# Patient Record
Sex: MALE | Race: WHITE | Employment: UNEMPLOYED | URBAN - METROPOLITAN AREA
[De-identification: names, ages, dates, MRNs, and addresses within clinical notes are randomized per-mention and may not be internally consistent; named-entity substitution may affect disease eponyms.]

---

## 2020-07-29 ENCOUNTER — APPOINTMENT (OUTPATIENT)
Dept: CT IMAGING | Age: 85
DRG: 957 | End: 2020-07-29
Payer: COMMERCIAL

## 2020-07-29 ENCOUNTER — HOSPITAL ENCOUNTER (INPATIENT)
Age: 85
LOS: 8 days | Discharge: NURSING, REHAB OR LONG-TERM CARE | DRG: 957 | End: 2020-08-06
Attending: EMERGENCY MEDICINE | Admitting: PSYCHIATRY & NEUROLOGY
Payer: COMMERCIAL

## 2020-07-29 ENCOUNTER — APPOINTMENT (OUTPATIENT)
Dept: MRI IMAGING | Age: 85
DRG: 957 | End: 2020-07-29
Payer: COMMERCIAL

## 2020-07-29 ENCOUNTER — APPOINTMENT (OUTPATIENT)
Dept: GENERAL RADIOLOGY | Age: 85
DRG: 957 | End: 2020-07-29
Payer: COMMERCIAL

## 2020-07-29 PROBLEM — I60.9 SAH (SUBARACHNOID HEMORRHAGE) (HCC): Status: ACTIVE | Noted: 2020-07-29

## 2020-07-29 LAB
% CKMB: 2.9 % (ref 0–3.5)
ABSOLUTE EOS #: 0.13 K/UL (ref 0–0.44)
ABSOLUTE IMMATURE GRANULOCYTE: <0.03 K/UL (ref 0–0.3)
ABSOLUTE LYMPH #: 1.02 K/UL (ref 1.1–3.7)
ABSOLUTE MONO #: 0.79 K/UL (ref 0.1–1.2)
ALLEN TEST: ABNORMAL
ANION GAP SERPL CALCULATED.3IONS-SCNC: 11 MMOL/L (ref 9–17)
ANION GAP: 7 MMOL/L (ref 7–16)
BASOPHILS # BLD: 0 % (ref 0–2)
BASOPHILS ABSOLUTE: <0.03 K/UL (ref 0–0.2)
BUN BLDV-MCNC: 16 MG/DL (ref 8–23)
BUN/CREAT BLD: ABNORMAL (ref 9–20)
CALCIUM SERPL-MCNC: 8.6 MG/DL (ref 8.6–10.4)
CHLORIDE BLD-SCNC: 99 MMOL/L (ref 98–107)
CK MB: 1.6 NG/ML
CKMB INTERPRETATION: ABNORMAL
CO2: 25 MMOL/L (ref 20–31)
CREAT SERPL-MCNC: 0.51 MG/DL (ref 0.7–1.2)
DIFFERENTIAL TYPE: ABNORMAL
EOSINOPHILS RELATIVE PERCENT: 2 % (ref 1–4)
ESTIMATED AVERAGE GLUCOSE: 108 MG/DL
FIO2: ABNORMAL
GFR AFRICAN AMERICAN: >60 ML/MIN
GFR NON-AFRICAN AMERICAN: >60 ML/MIN
GFR NON-AFRICAN AMERICAN: >60 ML/MIN
GFR SERPL CREATININE-BSD FRML MDRD: >60 ML/MIN
GFR SERPL CREATININE-BSD FRML MDRD: ABNORMAL ML/MIN/{1.73_M2}
GFR SERPL CREATININE-BSD FRML MDRD: ABNORMAL ML/MIN/{1.73_M2}
GFR SERPL CREATININE-BSD FRML MDRD: NORMAL ML/MIN/{1.73_M2}
GLUCOSE BLD-MCNC: 77 MG/DL (ref 74–100)
GLUCOSE BLD-MCNC: 81 MG/DL (ref 70–99)
HBA1C MFR BLD: 5.4 % (ref 4–6)
HCO3 VENOUS: 32.4 MMOL/L (ref 22–29)
HCT VFR BLD CALC: 36.6 % (ref 40.7–50.3)
HEMOGLOBIN: 12 G/DL (ref 13–17)
IMMATURE GRANULOCYTES: 0 %
INR BLD: 1
LYMPHOCYTES # BLD: 18 % (ref 24–43)
MCH RBC QN AUTO: 33.1 PG (ref 25.2–33.5)
MCHC RBC AUTO-ENTMCNC: 32.8 G/DL (ref 28.4–34.8)
MCV RBC AUTO: 101.1 FL (ref 82.6–102.9)
MODE: ABNORMAL
MONOCYTES # BLD: 14 % (ref 3–12)
MYOGLOBIN: 54 NG/ML (ref 28–72)
NEGATIVE BASE EXCESS, VEN: ABNORMAL (ref 0–2)
NRBC AUTOMATED: 0 PER 100 WBC
O2 DEVICE/FLOW/%: ABNORMAL
O2 SAT, VEN: 67 % (ref 60–85)
PARTIAL THROMBOPLASTIN TIME: 18.7 SEC (ref 20.5–30.5)
PATIENT TEMP: ABNORMAL
PCO2, VEN: 52.2 MM HG (ref 41–51)
PDW BLD-RTO: 14 % (ref 11.8–14.4)
PH VENOUS: 7.4 (ref 7.32–7.43)
PLATELET # BLD: ABNORMAL K/UL (ref 138–453)
PLATELET ESTIMATE: ABNORMAL
PLATELET, FLUORESCENCE: 135 K/UL (ref 138–453)
PLATELET, IMMATURE FRACTION: 8.2 % (ref 1.1–10.3)
PMV BLD AUTO: ABNORMAL FL (ref 8.1–13.5)
PO2, VEN: 35.5 MM HG (ref 30–50)
POC CHLORIDE: 100 MMOL/L (ref 98–107)
POC CREATININE: 0.65 MG/DL (ref 0.51–1.19)
POC HEMATOCRIT: 36 % (ref 41–53)
POC HEMOGLOBIN: 12.3 G/DL (ref 13.5–17.5)
POC IONIZED CALCIUM: 1.19 MMOL/L (ref 1.15–1.33)
POC LACTIC ACID: 1.35 MMOL/L (ref 0.56–1.39)
POC PCO2 TEMP: ABNORMAL MM HG
POC PH TEMP: ABNORMAL
POC PO2 TEMP: ABNORMAL MM HG
POC POTASSIUM: 4 MMOL/L (ref 3.5–4.5)
POC SODIUM: 139 MMOL/L (ref 138–146)
POSITIVE BASE EXCESS, VEN: 6 (ref 0–3)
POTASSIUM SERPL-SCNC: 4.1 MMOL/L (ref 3.7–5.3)
PROTHROMBIN TIME: 10.7 SEC (ref 9–12)
RBC # BLD: 3.62 M/UL (ref 4.21–5.77)
RBC # BLD: ABNORMAL 10*6/UL
SAMPLE SITE: ABNORMAL
SEG NEUTROPHILS: 65 % (ref 36–65)
SEGMENTED NEUTROPHILS ABSOLUTE COUNT: 3.67 K/UL (ref 1.5–8.1)
SODIUM BLD-SCNC: 135 MMOL/L (ref 135–144)
TOTAL CK: 56 U/L (ref 39–308)
TOTAL CO2, VENOUS: 34 MMOL/L (ref 23–30)
TROPONIN INTERP: ABNORMAL
TROPONIN T: ABNORMAL NG/ML
TROPONIN, HIGH SENSITIVITY: 32 NG/L (ref 0–22)
TSH SERPL DL<=0.05 MIU/L-ACNC: 1.44 MIU/L (ref 0.3–5)
WBC # BLD: 5.7 K/UL (ref 3.5–11.3)
WBC # BLD: ABNORMAL 10*3/UL

## 2020-07-29 PROCEDURE — 72125 CT NECK SPINE W/O DYE: CPT

## 2020-07-29 PROCEDURE — 85730 THROMBOPLASTIN TIME PARTIAL: CPT

## 2020-07-29 PROCEDURE — 84484 ASSAY OF TROPONIN QUANT: CPT

## 2020-07-29 PROCEDURE — 82435 ASSAY OF BLOOD CHLORIDE: CPT

## 2020-07-29 PROCEDURE — 99221 1ST HOSP IP/OBS SF/LOW 40: CPT | Performed by: PSYCHIATRY & NEUROLOGY

## 2020-07-29 PROCEDURE — 6360000004 HC RX CONTRAST MEDICATION: Performed by: EMERGENCY MEDICINE

## 2020-07-29 PROCEDURE — 83036 HEMOGLOBIN GLYCOSYLATED A1C: CPT

## 2020-07-29 PROCEDURE — 85055 RETICULATED PLATELET ASSAY: CPT

## 2020-07-29 PROCEDURE — 84132 ASSAY OF SERUM POTASSIUM: CPT

## 2020-07-29 PROCEDURE — 85025 COMPLETE CBC W/AUTO DIFF WBC: CPT

## 2020-07-29 PROCEDURE — 85014 HEMATOCRIT: CPT

## 2020-07-29 PROCEDURE — 93005 ELECTROCARDIOGRAM TRACING: CPT | Performed by: STUDENT IN AN ORGANIZED HEALTH CARE EDUCATION/TRAINING PROGRAM

## 2020-07-29 PROCEDURE — 70498 CT ANGIOGRAPHY NECK: CPT

## 2020-07-29 PROCEDURE — 82550 ASSAY OF CK (CPK): CPT

## 2020-07-29 PROCEDURE — 82553 CREATINE MB FRACTION: CPT

## 2020-07-29 PROCEDURE — 6360000004 HC RX CONTRAST MEDICATION: Performed by: STUDENT IN AN ORGANIZED HEALTH CARE EDUCATION/TRAINING PROGRAM

## 2020-07-29 PROCEDURE — 85610 PROTHROMBIN TIME: CPT

## 2020-07-29 PROCEDURE — 71045 X-RAY EXAM CHEST 1 VIEW: CPT

## 2020-07-29 PROCEDURE — 84295 ASSAY OF SERUM SODIUM: CPT

## 2020-07-29 PROCEDURE — 83874 ASSAY OF MYOGLOBIN: CPT

## 2020-07-29 PROCEDURE — 96374 THER/PROPH/DIAG INJ IV PUSH: CPT

## 2020-07-29 PROCEDURE — 71260 CT THORAX DX C+: CPT

## 2020-07-29 PROCEDURE — 6360000002 HC RX W HCPCS: Performed by: STUDENT IN AN ORGANIZED HEALTH CARE EDUCATION/TRAINING PROGRAM

## 2020-07-29 PROCEDURE — 84443 ASSAY THYROID STIM HORMONE: CPT

## 2020-07-29 PROCEDURE — 80048 BASIC METABOLIC PNL TOTAL CA: CPT

## 2020-07-29 PROCEDURE — 70450 CT HEAD/BRAIN W/O DYE: CPT

## 2020-07-29 PROCEDURE — 6360000002 HC RX W HCPCS: Performed by: INTERNAL MEDICINE

## 2020-07-29 PROCEDURE — 82565 ASSAY OF CREATININE: CPT

## 2020-07-29 PROCEDURE — 99285 EMERGENCY DEPT VISIT HI MDM: CPT

## 2020-07-29 PROCEDURE — 70551 MRI BRAIN STEM W/O DYE: CPT

## 2020-07-29 PROCEDURE — 82803 BLOOD GASES ANY COMBINATION: CPT

## 2020-07-29 PROCEDURE — 82330 ASSAY OF CALCIUM: CPT

## 2020-07-29 PROCEDURE — 82947 ASSAY GLUCOSE BLOOD QUANT: CPT

## 2020-07-29 PROCEDURE — 2500000003 HC RX 250 WO HCPCS: Performed by: STUDENT IN AN ORGANIZED HEALTH CARE EDUCATION/TRAINING PROGRAM

## 2020-07-29 PROCEDURE — 2580000003 HC RX 258: Performed by: STUDENT IN AN ORGANIZED HEALTH CARE EDUCATION/TRAINING PROGRAM

## 2020-07-29 PROCEDURE — 83605 ASSAY OF LACTIC ACID: CPT

## 2020-07-29 PROCEDURE — APPNB30 APP NON BILLABLE TIME 0-30 MINS: Performed by: REGISTERED NURSE

## 2020-07-29 PROCEDURE — 2060000000 HC ICU INTERMEDIATE R&B

## 2020-07-29 PROCEDURE — 72131 CT LUMBAR SPINE W/O DYE: CPT

## 2020-07-29 PROCEDURE — 6370000000 HC RX 637 (ALT 250 FOR IP): Performed by: STUDENT IN AN ORGANIZED HEALTH CARE EDUCATION/TRAINING PROGRAM

## 2020-07-29 PROCEDURE — 96376 TX/PRO/DX INJ SAME DRUG ADON: CPT

## 2020-07-29 PROCEDURE — 72128 CT CHEST SPINE W/O DYE: CPT

## 2020-07-29 RX ORDER — SODIUM CHLORIDE 0.9 % (FLUSH) 0.9 %
10 SYRINGE (ML) INJECTION PRN
Status: DISCONTINUED | OUTPATIENT
Start: 2020-07-29 | End: 2020-08-04

## 2020-07-29 RX ORDER — SENNA PLUS 8.6 MG/1
1 TABLET ORAL DAILY
Status: DISCONTINUED | OUTPATIENT
Start: 2020-07-30 | End: 2020-08-06 | Stop reason: HOSPADM

## 2020-07-29 RX ORDER — DOCUSATE SODIUM 100 MG/1
100 CAPSULE, LIQUID FILLED ORAL 2 TIMES DAILY
COMMUNITY

## 2020-07-29 RX ORDER — DOXAZOSIN 2 MG/1
2 TABLET ORAL NIGHTLY
COMMUNITY

## 2020-07-29 RX ORDER — IBUPROFEN 600 MG/1
600 TABLET ORAL 2 TIMES DAILY
COMMUNITY

## 2020-07-29 RX ORDER — PROPRANOLOL HCL 60 MG
60 CAPSULE, EXTENDED RELEASE 24HR ORAL 2 TIMES DAILY
COMMUNITY

## 2020-07-29 RX ORDER — FENTANYL CITRATE 50 UG/ML
25 INJECTION, SOLUTION INTRAMUSCULAR; INTRAVENOUS ONCE
Status: COMPLETED | OUTPATIENT
Start: 2020-07-29 | End: 2020-07-29

## 2020-07-29 RX ORDER — AMANTADINE HYDROCHLORIDE 100 MG/1
100 CAPSULE, GELATIN COATED ORAL DAILY
COMMUNITY

## 2020-07-29 RX ORDER — ACETAMINOPHEN 500 MG
1000 TABLET ORAL EVERY 8 HOURS SCHEDULED
Status: DISCONTINUED | OUTPATIENT
Start: 2020-07-29 | End: 2020-08-06 | Stop reason: HOSPADM

## 2020-07-29 RX ORDER — METHOCARBAMOL 750 MG/1
750 TABLET, FILM COATED ORAL 3 TIMES DAILY
Status: DISCONTINUED | OUTPATIENT
Start: 2020-07-29 | End: 2020-08-04

## 2020-07-29 RX ORDER — ONDANSETRON 2 MG/ML
4 INJECTION INTRAMUSCULAR; INTRAVENOUS EVERY 6 HOURS PRN
Status: DISCONTINUED | OUTPATIENT
Start: 2020-07-29 | End: 2020-08-06 | Stop reason: HOSPADM

## 2020-07-29 RX ORDER — CARBIDOPA 25 MG/1
TABLET ORAL
COMMUNITY
End: 2020-09-15 | Stop reason: SDUPTHER

## 2020-07-29 RX ORDER — CITALOPRAM 20 MG/1
20 TABLET ORAL DAILY
COMMUNITY

## 2020-07-29 RX ORDER — ATORVASTATIN CALCIUM 10 MG/1
10 TABLET, FILM COATED ORAL EVERY OTHER DAY
Status: ON HOLD | COMMUNITY
End: 2020-08-04 | Stop reason: HOSPADM

## 2020-07-29 RX ORDER — SODIUM CHLORIDE 0.9 % (FLUSH) 0.9 %
10 SYRINGE (ML) INJECTION EVERY 12 HOURS SCHEDULED
Status: DISCONTINUED | OUTPATIENT
Start: 2020-07-29 | End: 2020-08-04

## 2020-07-29 RX ORDER — PRIMIDONE 50 MG/1
100 TABLET ORAL 2 TIMES DAILY
COMMUNITY

## 2020-07-29 RX ORDER — LABETALOL HYDROCHLORIDE 5 MG/ML
5 INJECTION, SOLUTION INTRAVENOUS ONCE
Status: DISCONTINUED | OUTPATIENT
Start: 2020-07-29 | End: 2020-07-31

## 2020-07-29 RX ORDER — PROPRANOLOL HYDROCHLORIDE 40 MG/1
40 TABLET ORAL 2 TIMES DAILY
Status: ON HOLD | COMMUNITY
End: 2020-08-04 | Stop reason: HOSPADM

## 2020-07-29 RX ORDER — 0.9 % SODIUM CHLORIDE 0.9 %
50 INTRAVENOUS SOLUTION INTRAVENOUS ONCE
Status: DISCONTINUED | OUTPATIENT
Start: 2020-07-29 | End: 2020-07-29

## 2020-07-29 RX ORDER — LABETALOL HYDROCHLORIDE 5 MG/ML
5 INJECTION, SOLUTION INTRAVENOUS PRN
Status: DISCONTINUED | OUTPATIENT
Start: 2020-07-29 | End: 2020-07-30

## 2020-07-29 RX ORDER — FINASTERIDE 5 MG/1
5 TABLET, FILM COATED ORAL DAILY
COMMUNITY

## 2020-07-29 RX ORDER — PROPRANOLOL HYDROCHLORIDE 120 MG/1
120 CAPSULE, EXTENDED RELEASE ORAL 2 TIMES DAILY
Status: ON HOLD | COMMUNITY
End: 2020-07-31

## 2020-07-29 RX ORDER — DEXTROSE MONOHYDRATE 25 G/50ML
12.5 INJECTION, SOLUTION INTRAVENOUS
Status: ACTIVE | OUTPATIENT
Start: 2020-07-29 | End: 2020-07-29

## 2020-07-29 RX ORDER — SODIUM CHLORIDE 0.9 % (FLUSH) 0.9 %
10 SYRINGE (ML) INJECTION PRN
Status: DISCONTINUED | OUTPATIENT
Start: 2020-07-29 | End: 2020-07-31

## 2020-07-29 RX ORDER — SODIUM CHLORIDE 0.9 % (FLUSH) 0.9 %
10 SYRINGE (ML) INJECTION EVERY 12 HOURS SCHEDULED
Status: DISCONTINUED | OUTPATIENT
Start: 2020-07-29 | End: 2020-07-31

## 2020-07-29 RX ORDER — LABETALOL HYDROCHLORIDE 5 MG/ML
5 INJECTION, SOLUTION INTRAVENOUS ONCE
Status: COMPLETED | OUTPATIENT
Start: 2020-07-29 | End: 2020-07-29

## 2020-07-29 RX ORDER — CARBIDOPA AND LEVODOPA 25; 100 MG/1; MG/1
1 TABLET, EXTENDED RELEASE ORAL 4 TIMES DAILY
COMMUNITY

## 2020-07-29 RX ADMIN — IOHEXOL 90 ML: 350 INJECTION, SOLUTION INTRAVENOUS at 13:49

## 2020-07-29 RX ADMIN — METHOCARBAMOL TABLETS 750 MG: 750 TABLET, COATED ORAL at 23:26

## 2020-07-29 RX ADMIN — FENTANYL CITRATE 25 MCG: 50 INJECTION, SOLUTION INTRAMUSCULAR; INTRAVENOUS at 15:56

## 2020-07-29 RX ADMIN — FENTANYL CITRATE 25 MCG: 50 INJECTION, SOLUTION INTRAMUSCULAR; INTRAVENOUS at 14:12

## 2020-07-29 RX ADMIN — ACETAMINOPHEN 1000 MG: 500 TABLET ORAL at 23:26

## 2020-07-29 RX ADMIN — Medication 5 MG: at 17:39

## 2020-07-29 RX ADMIN — Medication 5 MG: at 17:55

## 2020-07-29 RX ADMIN — IOHEXOL 75 ML: 350 INJECTION, SOLUTION INTRAVENOUS at 19:33

## 2020-07-29 RX ADMIN — DEXTROSE MONOHYDRATE 5 MG/HR: 50 INJECTION, SOLUTION INTRAVENOUS at 18:30

## 2020-07-29 RX ADMIN — FENTANYL CITRATE 25 MCG: 50 INJECTION, SOLUTION INTRAMUSCULAR; INTRAVENOUS at 20:01

## 2020-07-29 ASSESSMENT — ENCOUNTER SYMPTOMS
VOMITING: 0
PHOTOPHOBIA: 0
NAUSEA: 0
CHEST TIGHTNESS: 0
TROUBLE SWALLOWING: 0
SHORTNESS OF BREATH: 0
DIARRHEA: 0
ABDOMINAL PAIN: 0
COUGH: 0

## 2020-07-29 ASSESSMENT — PAIN SCALES - GENERAL
PAINLEVEL_OUTOF10: 4
PAINLEVEL_OUTOF10: 5
PAINLEVEL_OUTOF10: 8

## 2020-07-29 NOTE — FLOWSHEET NOTE
Assessment:  was called to visit patient and his daughter Jamari Mcdowell. Patient had a headach from falling. Patient was at first a stroke alert, but now is being treated for a bleed. Intervention:  engaged in active listening.  explored the patients thoughts and feeling surrounding their situation.  asked if the patient would like prayer and informed the patient that chaplains are available 24/7. Outcome: Patient was thankful for the visit and will reach out if he wants to talk again.         07/29/20 2776   Encounter Summary   Services provided to: Patient and family together   Referral/Consult From: Multi-disciplinary team   Support System Children   Continue Visiting   (07/29/20)   Complexity of Encounter Moderate   Length of Encounter 15 minutes   Crisis   Type Follow up   Assessment Approachable   Intervention Active listening;Explored feelings, thoughts, concerns;Nurtured hope;Explored coping resources   Outcome Engaged in conversation;Comfort;Expressed gratitude

## 2020-07-29 NOTE — ED NOTES
1536 - Dr. Shantelle Holcomb aware of BP. No additional orders at this time.    1600 - Trauma resident at bedside     Es , [place]  07/29/20 1607

## 2020-07-29 NOTE — ED PROVIDER NOTES
[facility]     Emergency Department     Faculty Attestation    I performed a history and physical examination of the patient and discussed management with the resident. I reviewed the residents note and agree with the documented findings and plan of care. Any areas of disagreement are noted on the chart. I was personally present for the key portions of any procedures. I have documented in the chart those procedures where I was not present during the key portions. I have reviewed the emergency nurses triage note. I agree with the chief complaint, past medical history, past surgical history, allergies, medications, social and family history as documented unless otherwise noted below. For Physician Assistant/ Nurse Practitioner cases/documentation I have personally evaluated this patient and have completed at least one if not all key elements of the E/M (history, physical exam, and MDM). Additional findings are as noted. I have personally seen and evaluated the patient. I find the patient's history and physical exam are consistent with the NP/PA documentation. I agree with the care provided, treatment rendered, disposition and follow-up plan. 81-year-old male with a history of Parkinson's disease presenting with strokelike symptoms. States that at 1210 he went to answer the door for [employer], fell against a door because his left arm and left leg were weak. He has not noticed this in the past.  He denies any other symptoms. Exam:  General: Laying on the bed, awake, alert and in no acute distress  CV: normal rate and regular rhythm  Lungs: Breathing comfortably on room air with no tachypnea, hypoxia, or increased work of breathing  Neuro: Left-sided facial droop, difficulty naming objects, and left-sided arm and leg weakness    Plan:  Last known well 1210, still within TPA window. Patient taken immediately to CT upon arrival via EMS.   CT and CTA obtained, showing no acute bleed. Patient consented for TPA by neurology resident Dr. Odalys Sutton. Discussed was risk of bleeding, risk of not using TPA (residual deficits). CT read shows possible small subarachnoid hemorrhage. Neuro team ordering stat MRI to further characterize. TPA held at this time. Patient signed out to Dr. Marleni Thomas pending MRI results and further decision regarding TPA.   Please see their note for the remainder of this patient's care      EKG Interpretation    Interpreted by emergency department physician    Rhythm: normal sinus   Rate: 62  Axis: normal  Ectopy: premature ventricular contractions (infrequent)  Conduction: normal  ST Segments: normal  T Waves: normal  Q Waves: none    Clinical Impression: no acute changes    Derrick Araujo MD   Attending Emergency  Physician              Sandi Garcia MD  07/29/20 6976

## 2020-07-29 NOTE — ED NOTES
Labeled blood specimen collected and sent to lab via tube system.        Gerome Sicard, RN  07/29/20 7987

## 2020-07-29 NOTE — CONSULTS
Department of Endovascular Neurosurgery                                         Resident Consult Note  Stroke Alert paged @ 1;30PM  ER Room # 21  Arrival to patient bedside @ 1:35    Reason for Consult:  Stroke alert  Requesting Physician:  Carmelo Irizarry MD  Endovascular Neurosurgeon:   [x]Dr. Manjinder Martinez  []Dr. David Alvarenga    History Obtained From:  patient, electronic medical record    CHIEF COMPLAINT:       Fall, left facial droop, dysarthria, left upper and lower extremity weakness    HISTORY OF PRESENT ILLNESS:       The patient is a 81 y.o. male with PMH of Parkinson's Disase who presents with fall and weakness hitting wall. Patient developed left sided facial droop, dysarthria, left upper and lower extremity weakness. As per patient he was at his house today and some delivery people came to bring him some food and he stood up from chair with walker but felt weak and fell hitting wall with head leaving dent. EMS was called and they notice some left sided symptoms (facial droop, weakness) also dysarthria and was ariadna to be convinced to come to ED. Patient came to [facility] and was taken to  [facility] for exam. Stroke team called. Meds: Atorvastatin 10mg PO D, Sinemet 25/100 BID, Primidone 100mg PO BID     LWK: 12:10-12:30PM on 7/29/2020  NIHSS: 5  PAST MEDICAL HISTORY :       Past Medical History:        Diagnosis Date    Parkinson's disease [facility])        Past Surgical History:    No past surgical history on file.     Social History:   Social History     Socioeconomic History    Marital status: Not on file     Spouse name: Not on file    Number of children: Not on file    Years of education: Not on file    Highest education level: Not on file   Occupational History    Not on file   Social Needs    Financial resource strain: Not on file    Food insecurity     Worry: Not on file     Inability: Not on file    Transportation needs     Medical: Not on file     Non-medical: Not on file   Tobacco Use    Smoking status: Not on file   Substance and Sexual Activity    Alcohol use: Not on file    Drug use: Not on file    Sexual activity: Not on file   Lifestyle    Physical activity     Days per week: Not on file     Minutes per session: Not on file    Stress: Not on file   Relationships    Social connections     Talks on phone: Not on file     Gets together: Not on file     Attends Protestant service: Not on file     Active member of club or organization: Not on file     Attends meetings of clubs or organizations: Not on file     Relationship status: Not on file    Intimate partner violence     Fear of current or ex partner: Not on file     Emotionally abused: Not on file     Physically abused: Not on file     Forced sexual activity: Not on file   Other Topics Concern    Not on file   Social History Narrative    Not on file       Family History:   No family history on file. Allergies:  Patient has no allergy information on record. Home Medications:  Prior to Admission medications    Not on File       REVIEW OF SYSTEMS:       CONSTITUTIONAL: negative for fatigue and malaise   EYES: negative for double vision and photophobia    HEENT: negative for tinnitus and sore throat   RESPIRATORY: negative for cough, shortness of breath   CARDIOVASCULAR: negative for chest pain, palpitations   GASTROINTESTINAL: negative for nausea, vomiting   GENITOURINARY: negative for incontinence   MUSCULOSKELETAL: negative for neck or back pain   NEUROLOGICAL: negative for seizures, numbness, aphasia  Positive for headache, weakness, dysarthria   PSYCHIATRIC: negative for fatigue     PHYSICAL EXAM:       BP (!) 170/80   Pulse 64   Resp 18   SpO2 97%     CONSTITUTIONAL:  Well developed, well nourished, alert and oriented x 3, in no acute distress. GCS 15, nontoxic. Mild dysarthria, no aphasia.    HEAD:  normocephalic, atraumatic    EYES:  PERRLA, EOMI.   ENT:  moist mucous membranes   NECK:  supple, symmetric, no midline tenderness to palpation    BACK:  without midline tenderness, step-offs or deformities    LUNGS:  Equal air entry bilaterally   CARDIOVASCULAR:  normal s1 / s2   ABDOMEN:  Soft, no rigidity   NEUROLOGIC:  Mental Status:  A & O x3,awake             Cranial Nerves:    cranial nerves II-XII are grossly intact except left facial weakness    Motor Exam:    Drift:  present - LUE, LLE  Tone:  abnormal - mild decrease tone LUE    RUE: 5/5  LUE: 3/5  RLE: 5/5  LLE: 4/5    Sensory:    Touch:    Right Upper Extremity:  normal  Left Upper Extremity:  normal  Right Lower Extremity:  normal  Left Lower Extremity:  normal    Deep Tendon Reflexes:    Right Bicep:  2+  Left Bicep:  2+  Right Knee:  2+  Left Knee:  2+    Plantar Response:  Right:  downgoing  Left:  downgoing    Clonus:  absent  Canchola's:  absent    Coordination/Dysmetria:  Heel to Shin:  Right:  normal  Left:  abnormal - weakness  Finger to Nose:   Right:  normal  Left:  abnormal - weakness     Gait:  Deferred due to weakness    INITIAL NIH STROKE SCALE:    Time Performed:  1:35 PM     1a. Level of consciousness:  0 - alert; keenly responsive  1b. Level of consciousness questions:  0 - answers both questions correctly  1c. Level of consciousness questions:  0 - performs both tasks correctly  2. Best Gaze:  0 - normal  3. Visual:  0 - no visual loss  4. Facial Palsy:  2 - partial paralysis (total or near total paralysis of the lower face)  5a. Motor left arm:  1 - drift, limb holds 90 (or 45) degrees but drifts down before full 10 seconds: does not hit bed  5b. Motor right arm:  0 - no drift, limb holds 90 (or 45) degrees for full 10 seconds  6a. Motor left le - drift; leg falls by the end of the 5 second period but does not hit bed  6b. Motor right le - no drift; leg holds 30 degree position for full 5 seconds  7. Limb Ataxia:  0 - absent  8. Sensory:  0 - normal; no sensory loss  9. Best Language:  0 - no aphasia, normal  10. Dysarthria:  1 - mild to moderate, patient slurs at least some words and at worst, can be understood with some difficulty  11.   Extinction and Inattention:  0 - no abnormality    TOTAL:  5     SKIN:  no rash      Modified [place] Score Scale:     [] Zero: No symptoms at all   [x] 1: No significant disability despite symptoms; able to carry out all usual duties and activities   [] 2: Slight disability; unable to carry out all previous activities, but able to look after own affairs without assistance   [] 3:Moderate disability; requiring some help, but able to walk without assistance   [] 4: Moderately severe disability; unable to walk and attend to bodily needs without assistance   [] 5:Severe disability; bedridden, incontinent and requiring constant nursing care and attention    LABS AND IMAGING:     CBC with Differential:    Lab Results   Component Value Date    WBC 5.7 07/29/2020    RBC 3.62 07/29/2020    HGB 12.0 07/29/2020    HCT 36.6 07/29/2020    PLT See Reflexed IPF Result 07/29/2020    .1 07/29/2020    MCH 33.1 07/29/2020    MCHC 32.8 07/29/2020    RDW 14.0 07/29/2020    LYMPHOPCT 18 07/29/2020    MONOPCT 14 07/29/2020    BASOPCT 0 07/29/2020    MONOSABS 0.79 07/29/2020    LYMPHSABS 1.02 07/29/2020    EOSABS 0.13 07/29/2020    BASOSABS <0.03 07/29/2020    DIFFTYPE NOT REPORTED 07/29/2020     BMP:    Lab Results   Component Value Date    NA PENDING 07/29/2020    K PENDING 07/29/2020    CL PENDING 07/29/2020    CO2 PENDING 07/29/2020    BUN PENDING 07/29/2020    CREATININE PENDING 07/29/2020    CALCIUM PENDING 07/29/2020    GFRAA PENDING 07/29/2020    LABGLOM PENDING 07/29/2020    GLUCOSE PENDING 07/29/2020     Radiology Review:    1.) CT Head without contrast:  Impression    Cerebral atrophy.  Chronic small vessel ischemic changes.  Area of    hypoattenuation in the left frontal periventricular white matter which likely    represents a infarct of indeterminate age.         Questionable subtle hyperattenuation over the right frontal convexity which    is suspicious for a tiny subarachnoid hemorrhage.         Report was called and discussed with Dr. Alvaro Garcia, 07/29/2020 at 2:16 p.m.           2.) CTA Head/Neck:   Impression    Approximately 30% stenosis of the left internal carotid artery in the    proximal left carotid bulb and approximately 50-60% stenosis of the left    internal carotid artery just distal to the bulb.         Atherosclerotic calcification of the right internal carotid artery with    approximately 20% stenosis in the region of the bulb.         The left vertebral artery originates directly from the aortic arch which is a    normal anatomic variant.         No significant abnormality of the intracranial circulation.  Incidental note    is made of fetal origin of the right posterior cerebral artery which is a    normal anatomic variant.           3.)  Brain MRI WO  Impression    Punctate 3 mm acute to subacute infarct in the posterior right frontal lobe    adjacent to the falx.  No other areas of restricted diffusion.         There is subarachnoid hemorrhage over the high right parietal lobe and    posterior right frontal lobe.         Cerebral atrophy.  Chronic small vessel ischemic changes.           ASSESSMENT AND PLAN:     Assessment                 81 y.o. male who presents with fall hitting head and then have dysarthria, left facial droop, left-sided weakness. CT head showed high right parietal subarachnoid hemorrhage and brain MRI confirmed the ischemic stroke in the high parietal right-sided subarachnoid hemorrhage. No TPA. 1. Last Known Well (date and time): 12:20-12:30PM on 7/29/2020    2.  Candidate for IV tPA therapy     Yes [x]  - But there is quaestionable tiny SAH on head CT without contrast and MRI brain without was done and effectively showed the acute infarct but also subarachnoid hemorrhage and will not be candidate by this condition      No  [] due to the following exclusion criteria:      Contraindications for IV tPA:   ? Symptom onset is unknown, > 4.5 hours, or if patient awoke with stroke   ? Acute or previous intracranial hemorrhage   ? Imaging showing extensive regions of irreversible injury (hypoattenuation)   ? Prior ischemic stroke, severe head trauma, or intracranial/intraspinal surgery within 3 months   ? Symptoms of subarachnoid hemorrhage   ? GI malignancy or GI bleed within 21 days   ? Coagulopathy: (Platelets < 018, 418/XJ³, INR > 1.7, aPTT > 40 s, PT > 15 s)   ? Treatment dose of low molecular weight heparin within 24 hours (does not apply to prophylactic doses to prevent VTE)   ? Use of anticoagulant drugs (thrombin inhibitors and factor Xa inhibitors) unless labs are normal or when patient has not taken for >48 hours with normal renal function   ? Use of antiplatelet that inhibits glycoprotein IIb/IIIa receptors (except in clinical trials)   ? Infective endocarditis due to increased risk of intracranial hemorrhage   ? Aortic arch dissection   ? Intra-axial (inside the brain tissue) intracranial neoplasm   ? Persistent elevated blood pressure (systolic > 190 mm Hg or diastolic > 965 mm Hg)   ? Active internal bleeding      3. Candidate for Thrombectomy    Yes []      No [x] due to the following exclusion criteria: no LVO    - Discussed with Dr. Debbi Gomes     Recommendations:      - Taye Mckeon     -Patient was candidate for TPA but after careful examination of CT head without contrast there was some hyperintensity on the high parietal lobe and with brain MRI done it confirmed the hyperintensity for the stroke but also subarachnoid hemorrhage in the high right parietal lobe and right posterior right frontal lobe.  No tPA at moment    - Trauma surgery evaluation -subarachnoid hemorrhage looks as if it is traumatic.    - TTE with Bubble Study    - Folic acid 1mg BID    - Continue Atorvastatin 10mg nightly     - Fasting Lipid

## 2020-07-29 NOTE — ED NOTES
Bed: 21  Expected date:   Expected time:   Means of arrival:   Comments:  Cammie Desir RN  07/29/20 1511

## 2020-07-29 NOTE — ED PROVIDER NOTES
[facility] ED  Emergency Department Encounter  EmergencyMedicine Resident     Pt Name:Shekhar Cisse  MRN: [id number]  [place] 1935  Date of evaluation: 7/29/20  PCP:  Candelaria Mack MD    [address] [place] [address]       Chief Complaint   Patient presents with    Altered Mental Status     Last known well 1210       HISTORY OF PRESENT ILLNESS  (Location/Symptom, Timing/Onset, Context/Setting, Quality, Duration, Modifying Factors, Severity.)      Ramiro Pendleton is a 81 y.o. male who presents with left-sided facial droop, left-sided arm and leg weakness 1 hour ago. Patient's last known well was at 12:30 PM.  Patient on aspirin 81 mg. No previous history of strokes. Patient was walking to his door when he fell. EMS reported that he had left-sided facial droop and weakness of the left arm and legs. Patient NIHSS is 6 to 7, oriented x4. Patient has a history of severe parkinsonism, on cimetidine currently. PAST MEDICAL / SURGICAL / SOCIAL / FAMILY HISTORY      has a past medical history of BPH (benign prostatic hyperplasia), Cancer ([address] Cervical spondylolysis, Depression, Hyperlipidemia, Hypertension, Neck pain, Neuropathy, Orofacial dyskinesia, Osteoarthritis, and Parkinson's disease ([address] has a past surgical history that includes joint replacement.       Social History     Socioeconomic History    Marital status: Single     Spouse name: Not on file    Number of children: Not on file    Years of education: Not on file    Highest education level: Not on file   Occupational History    Not on file   Social Needs    Financial resource strain: Not on file    Food insecurity     Worry: Not on file     Inability: Not on file    Transportation needs     Medical: Not on file     Non-medical: Not on file   Tobacco Use    Smoking status: Not on file   Substance and Sexual Activity    Alcohol use: Not on file    Drug use: Not on file    Sexual activity: Not on file   Lifestyle    Physical activity     Days per week: Not on file     Minutes per session: Not on file    Stress: Not on file   Relationships    Social connections     Talks on phone: Not on file     Gets together: Not on file     Attends Hoahaoism service: Not on file     Active member of club or organization: Not on file     Attends meetings of clubs or organizations: Not on file     Relationship status: Not on file    Intimate partner violence     Fear of current or ex partner: Not on file     Emotionally abused: Not on file     Physically abused: Not on file     Forced sexual activity: Not on file   Other Topics Concern    Not on file   Social History Narrative    Not on file       No family history on file. Allergies:  Patient has no known allergies. Home Medications:  Prior to Admission medications    Medication Sig Start Date End Date Taking? Authorizing Provider   carbidopa-levodopa (SINEMET CR)  MG per extended release tablet Take 1 tablet by mouth 2 times daily   Yes Historical Provider, MD   carbidopa (LODOSYN) 25 MG TABS tablet Take by mouth 3 at 7, 3 at 11, 2 4 pm 2 at 7   Yes Historical Provider, MD   citalopram (CELEXA) 20 MG tablet Take 20 mg by mouth daily   Yes Historical Provider, MD   propranolol (INDERAL LA) 120 MG extended release capsule Take 120 mg by mouth daily   Yes Historical Provider, MD   propranolol (INDERAL LA) 60 MG extended release capsule Take 60 mg by mouth daily   Yes Historical Provider, MD   finasteride (PROSCAR) 5 MG tablet Take 5 mg by mouth daily   Yes Historical Provider, MD   docusate sodium (COLACE) 100 MG capsule Take 100 mg by mouth 2 times daily   Yes Historical Provider, MD   primidone (MYSOLINE) 50 MG tablet Take 100 mg by mouth 2 times daily   Yes Historical Provider, MD   amantadine (SYMMETREL) 100 MG capsule Take 100 mg by mouth 2 times daily   Yes Historical Provider, MD   UNABLE TO FIND Med to replace flomax. Starts with dox?    Yes Historical Provider, MD DIAGNOSIS     PLAN (LABS / IMAGING / EKG):  Orders Placed This Encounter   Procedures    CT Head WO Contrast    CTA HEAD NECK W CONTRAST    MRI LIMITED BRAIN    XR CHEST PORTABLE    CT CERVICAL SPINE WO CONTRAST    CT THORACIC SPINE WO CONTRAST    CT LUMBAR SPINE WO CONTRAST    CT CHEST ABDOMEN PELVIS W CONTRAST    STROKE PANEL    Hemoglobin and hematocrit, blood    SODIUM (POC)    POTASSIUM (POC)    CHLORIDE (POC)    CALCIUM, IONIC (POC)    Immature Platelet Fraction    Hemoglobin A1C    TSH with Reflex    Diet NPO Effective Now    Pulse Oximetry    Telemetry monitoring - 48 hour duration    Misc nursing order (specify)    Misc nursing order (specify)    Inpatient consult to Trauma Surgery    Inpatient consult to Neurosurgery    Initiate Oxygen Therapy Protocol    POC Blood Gas and Chemistry    Venous Blood Gas, POC    Creatinine W/GFR Point of Care    Lactic Acid, POC    POCT Glucose    Anion Gap (Calc) POC    Saline lock IV    PATIENT STATUS (FROM ED OR OR/PROCEDURAL) Inpatient    Fall precautions       MEDICATIONS ORDERED:  Orders Placed This Encounter   Medications    iohexol (OMNIPAQUE 350) solution 90 mL    DISCONTD: alteplase (ACTIVASE) injection 6.6 mg    DISCONTD: alteplase (ACTIVASE) injection 59.5 mg    DISCONTD: 0.9 % sodium chloride bolus    sodium chloride flush 0.9 % injection 10 mL    sodium chloride flush 0.9 % injection 10 mL    dextrose 50 % IV solution    labetalol (NORMODYNE;TRANDATE) injection 5 mg    fentaNYL (SUBLIMAZE) injection 25 mcg    fentaNYL (SUBLIMAZE) injection 25 mcg    labetalol (NORMODYNE;TRANDATE) injection 5 mg    labetalol (NORMODYNE;TRANDATE) injection 5 mg    niCARdipine (CARDENE) 25 mg in dextrose 5 % 250 mL infusion    fentaNYL (SUBLIMAZE) injection 25 mcg       DDX: Right MCA stroke, right CYNDY stroke    DIAGNOSTIC RESULTS / EMERGENCY DEPARTMENT COURSE / MDM   LAB RESULTS:  Results for orders placed or performed during the hospital encounter of 07/29/20   STROKE PANEL   Result Value Ref Range    Glucose 81 70 - 99 mg/dL    BUN 16 8 - 23 mg/dL    CREATININE 0.51 (L) 0.70 - 1.20 mg/dL    Bun/Cre Ratio NOT REPORTED 9 - 20    Calcium 8.6 8.6 - 10.4 mg/dL    Sodium 135 135 - 144 mmol/L    Potassium 4.1 3.7 - 5.3 mmol/L    Chloride 99 98 - 107 mmol/L    CO2 25 20 - 31 mmol/L    Anion Gap 11 9 - 17 mmol/L    GFR Non-African American >60 >60 mL/min    GFR African American >60 >60 mL/min    GFR Comment          GFR Staging NOT REPORTED     WBC 5.7 3.5 - 11.3 k/uL    RBC 3.62 (L) 4.21 - 5.77 m/uL    Hemoglobin 12.0 (L) 13.0 - 17.0 g/dL    Hematocrit 36.6 (L) 40.7 - 50.3 %    .1 82.6 - 102.9 fL    MCH 33.1 25.2 - 33.5 pg    MCHC 32.8 28.4 - 34.8 g/dL    RDW 14.0 11.8 - 14.4 %    Platelets See Reflexed IPF Result 138 - 453 k/uL    MPV NOT REPORTED 8.1 - 13.5 fL    NRBC Automated 0.0 0.0 per 100 WBC    Total CK 56 39 - 308 U/L    CK-MB 1.6 <10.5 ng/mL    % CKMB 2.9 0.0 - 3.5 %    CKMB Interpretation NORMAL ISOENZYME PATTERN     Differential Type NOT REPORTED     WBC Morphology NOT REPORTED     RBC Morphology NOT REPORTED     Platelet Estimate NOT REPORTED     Seg Neutrophils 65 36 - 65 %    Lymphocytes 18 (L) 24 - 43 %    Monocytes 14 (H) 3 - 12 %    Eosinophils % 2 1 - 4 %    Basophils 0 0 - 2 %    Immature Granulocytes 0 0 %    Segs Absolute 3.67 1.50 - 8.10 k/uL    Absolute Lymph # 1.02 (L) 1.10 - 3.70 k/uL    Absolute Mono # 0.79 0.10 - 1.20 k/uL    Absolute Eos # 0.13 0.00 - 0.44 k/uL    Basophils Absolute <0.03 0.00 - 0.20 k/uL    Absolute Immature Granulocyte <0.03 0.00 - 0.30 k/uL    Myoglobin 54 28 - 72 ng/mL    Protime 10.7 9.0 - 12.0 sec    INR 1.0     PTT 18.7 (L) 20.5 - 30.5 sec    Troponin, High Sensitivity 32 (H) 0 - 22 ng/L    Troponin T NOT REPORTED <0.03 ng/mL    Troponin Interp NOT REPORTED    Hemoglobin and hematocrit, blood   Result Value Ref Range    POC Hemoglobin 12.3 (L) 13.5 - 17.5 g/dL    POC Hematocrit 36 (L) 41 - 53 %   SODIUM (POC)   Result Value Ref Range    POC Sodium 139 138 - 146 mmol/L   POTASSIUM (POC)   Result Value Ref Range    POC Potassium 4.0 3.5 - 4.5 mmol/L   CHLORIDE (POC)   Result Value Ref Range    POC Chloride 100 98 - 107 mmol/L   CALCIUM, IONIC (POC)   Result Value Ref Range    POC Ionized Calcium 1.19 1.15 - 1.33 mmol/L   Immature Platelet Fraction   Result Value Ref Range    Platelet, Immature Fraction 8.2 1.1 - 10.3 %    Platelet, Fluorescence 135 (L) 138 - 453 k/uL   Hemoglobin A1C   Result Value Ref Range    Hemoglobin A1C 5.4 4.0 - 6.0 %    Estimated Avg Glucose 108 mg/dL   TSH with Reflex   Result Value Ref Range    TSH 1.44 0.30 - 5.00 mIU/L   Venous Blood Gas, POC   Result Value Ref Range    pH, Demond 7.401 7.320 - 7.430    pCO2, Demond 52.2 (H) 41.0 - 51.0 mm Hg    pO2, Demond 35.5 30.0 - 50.0 mm Hg    HCO3, Venous 32.4 (H) 22.0 - 29.0 mmol/L    Total CO2, Venous 34 (H) 23.0 - 30.0 mmol/L    Negative Base Excess, Demond NOT REPORTED 0.0 - 2.0    Positive Base Excess, Demond 6 (H) 0.0 - 3.0    O2 Sat, Demond 67 60.0 - 85.0 %    O2 Device/Flow/% NOT REPORTED     Dayron Test NOT REPORTED     Sample Site NOT REPORTED     Mode NOT REPORTED     FIO2 NOT REPORTED     Pt Temp NOT REPORTED     POC pH Temp NOT REPORTED     POC pCO2 Temp NOT REPORTED mm Hg    POC pO2 Temp NOT REPORTED mm Hg   Creatinine W/GFR Point of Care   Result Value Ref Range    POC Creatinine 0.65 0.51 - 1.19 mg/dL    GFR Comment >60 >60 mL/min    GFR Non-African American >60 >60 mL/min    GFR Comment         Lactic Acid, POC   Result Value Ref Range    POC Lactic Acid 1.35 0.56 - 1.39 mmol/L   POCT Glucose   Result Value Ref Range    POC Glucose 77 74 - 100 mg/dL   Anion Gap (Calc) POC   Result Value Ref Range    Anion Gap 7 7 - 16 mmol/L       IMPRESSION: Likely left-sided stroke, MCA more than CYNDY  RADIOLOGY:  Head CT shows no acute intracranial bleeding    EKG  Prolonged IA interval, otherwise unchanged from previous    All EKG's are interpreted by the Emergency Department Physician who either signs or Co-signs this chart in the absence of a cardiologist.    EMERGENCY DEPARTMENT COURSE:  ED Course as of Jul 29 1848 Wed Jul 29, 2020   1423 Troponin, High Sensitivity(!): 32 [EM]   2400 CT head Noncon shows questionable subtle hyperattenuation over the right frontal convexity which is suspicious for a tiny subarachnoid hemorrhage -neurology will get stat MRI    [EM]   1515 CTA:  30% stenosis of the left internal carotid artery   50-60% stenosis of the left  internal carotid artery just distal to the bulb  Atherosclerotic calcification of the right internal carotid artery with  approximately 20% stenosis in the region of the bulb    [EM]   1517 BP(!): 189/84 [EM]   1525 MRI brain subarachnoid hemorrhage over the high right parietal lobe and posterior right frontal lobe. Will not give TPA    [EM]   1750 BP(!): 203/102 [EM]   8298 Patient's BP still in 200s after a bolus of 10 mg of labetalol, will start him on nicardipine drip, blood pressure goal is less than 160 due to hemorrhagic stroke    [EM]   1839 BP(!): 180/90 [EM]      ED Course User Index  [EM] Peg Lesches, MD     PROCEDURES:  None    CONSULTS:  IP CONSULT TO TRAUMA SURGERY  IP CONSULT TO NEUROSURGERY    CRITICAL CARE:  Please see attending note    FINAL IMPRESSION      1.  Cerebrovascular accident (CVA), unspecified mechanism ([facility] [address]          DISPOSITION / PLAN     DISPOSITION Admitted 07/29/2020 05:58:01 PM      PATIENT REFERRED TO:  Prince Eric MD  [facility] [phone]            DISCHARGE MEDICATIONS:  New Prescriptions    No medications on file       Peg Lesches, MD  Emergency Medicine Resident    (Please note that portions of thisnote were completed with a voice recognition program.  Efforts were made to edit the dictations but occasionally words are mis-transcribed.)        Peg Lesches, MD  Resident  07/29/20 0053

## 2020-07-29 NOTE — ED PROVIDER NOTES
[facility] [address] ED  Emergency Department  Emergency Medicine Resident Sign-out     Care of Supriya Begum was assumed from Dr. Kishore Perry and is being seen for Altered Mental Status (Last known well 1210)  . The patient's initial evaluation and plan have been discussed with the prior provider who initially evaluated the patient.      EMERGENCY DEPARTMENT COURSE / MEDICAL DECISION MAKING:       MEDICATIONS GIVEN:  Orders Placed This Encounter   Medications    iohexol (OMNIPAQUE 350) solution 90 mL    DISCONTD: alteplase (ACTIVASE) injection 6.6 mg    DISCONTD: alteplase (ACTIVASE) injection 59.5 mg    DISCONTD: 0.9 % sodium chloride bolus    sodium chloride flush 0.9 % injection 10 mL    sodium chloride flush 0.9 % injection 10 mL    dextrose 50 % IV solution    labetalol (NORMODYNE;TRANDATE) injection 5 mg    fentaNYL (SUBLIMAZE) injection 25 mcg    fentaNYL (SUBLIMAZE) injection 25 mcg    labetalol (NORMODYNE;TRANDATE) injection 5 mg    labetalol (NORMODYNE;TRANDATE) injection 5 mg    niCARdipine (CARDENE) 25 mg in dextrose 5 % 250 mL infusion    fentaNYL (SUBLIMAZE) injection 25 mcg       LABS / RADIOLOGY:     Labs Reviewed   STROKE PANEL - Abnormal; Notable for the following components:       Result Value    CREATININE 0.51 (*)     RBC 3.62 (*)     Hemoglobin 12.0 (*)     Hematocrit 36.6 (*)     Lymphocytes 18 (*)     Monocytes 14 (*)     Absolute Lymph # 1.02 (*)     PTT 18.7 (*)     Troponin, High Sensitivity 32 (*)     All other components within normal limits   HGB/HCT - Abnormal; Notable for the following components:    POC Hemoglobin 12.3 (*)     POC Hematocrit 36 (*)     All other components within normal limits   IMMATURE PLATELET FRACTION - Abnormal; Notable for the following components:    Platelet, Fluorescence 135 (*)     All other components within normal limits   VENOUS BLOOD GAS, POINT OF CARE - Abnormal; Notable for the following components:    pCO2, Demond 52.2 (*)     HCO3, Venous 32.4 (*)     Total CO2, Venous 34 (*)     Positive Base Excess, Demond 6 (*)     All other components within normal limits   SODIUM (POC)   POTASSIUM (POC)   CHLORIDE (POC)   CALCIUM, IONIC (POC)   HEMOGLOBIN A1C   TSH WITH REFLEX   POC BLOOD GAS AND CHEMISTRY   CREATININE W/GFR POINT OF CARE   LACTIC ACID,POINT OF CARE   POCT GLUCOSE   ANION GAP (CALC) POC       Ct Head Wo Contrast    Result Date: 7/29/2020  EXAMINATION: CT OF THE HEAD WITHOUT CONTRAST,  7/29/2020 1:40 pm TECHNIQUE: CT of the head was performed without the administration of intravenous contrast. Dose modulation, iterative reconstruction, and/or weight based adjustment of the mA/kV was utilized to reduce the radiation dose to as low as reasonably achievable. COMPARISON: None HISTORY: ORDERING SYSTEM PROVIDED HISTORY: Stroke, L sided weakness. LKW 1210 pm TECHNOLOGIST PROVIDED HISTORY: Stroke, L sided weakness. LKW 1210 pm Reason for Exam: Stroke, L sided weakness. LKW 1210 pm Acuity: Unknown Type of Exam: Unknown Initial evaluation FINDINGS: BRAIN/VENTRICLES: There is mild cerebral atrophy. There is atherosclerotic calcification of the cavernous carotid arteries. There are areas of hypoattenuation in the periventricular white matter and centrum semiovale that are likely related to chronic small vessel ischemic disease. There is a focal area of hypoattenuation in the left frontal lobe that likely represents an infarct of indeterminate age. There is no midline shift or mass effect. There is a subtle questionable area of hyperdensity in one of the sulci over the high right parietal region. While this may be artifact, very subtle subarachnoid hemorrhage cannot be excluded. ORBITS: The visualized portion of the orbits demonstrate no acute abnormality. SINUSES:  The visualized paranasal sinuses and mastoid air cells are clear. SOFT TISSUES/SKULL:  No acute abnormality of the visualized skull or soft tissues. Cerebral atrophy.   Chronic small vessel ischemic changes. Area of hypoattenuation in the left frontal periventricular white matter which likely represents an infarct of indeterminate age. Questionable subtle hyperattenuation over the right frontal convexity which is suspicious for a tiny subarachnoid hemorrhage. Report was called and discussed with Dr. Rosario Pedro, 07/29/2020 at 2:16 p.m. Xr Chest Portable    Result Date: 7/29/2020  EXAMINATION: ONE XRAY VIEW OF THE CHEST 7/29/2020 3:56 pm COMPARISON: None. HISTORY: ORDERING SYSTEM PROVIDED HISTORY: pCO2 high, no SOB TECHNOLOGIST PROVIDED HISTORY: pCO2 high, no SOB FINDINGS: No focal consolidation. Mild cardiomegaly. No pulmonary edema. Calcified granuloma. No acute findings. Cta Head Neck W Contrast    Result Date: 7/29/2020  EXAMINATION: CTA OF THE HEAD AND NECK WITH CONTRAST 7/29/2020 1:40 pm: TECHNIQUE: CTA of the head and neck was performed with the administration of intravenous contrast. Multiplanar reformatted images are provided for review. MIP images are provided for review. Stenosis of the internal carotid arteries measured using NASCET criteria. Dose modulation, iterative reconstruction, and/or weight based adjustment of the mA/kV was utilized to reduce the radiation dose to as low as reasonably achievable. COMPARISON: None. HISTORY: ORDERING SYSTEM PROVIDED HISTORY: stroke symptoms, L sided weakness, LKW 1210pm TECHNOLOGIST PROVIDED HISTORY: stroke symptoms, L sided weakness, LKW 1210pm Reason for Exam: stroke, L sided weakness. LKW 1210pm Acuity: Unknown Type of Exam: Unknown Initial evaluation. FINDINGS: CTA NECK: AORTIC ARCH/ARCH VESSELS: No significant abnormality of the aortic arch is identified. Incidental note is made of the left vertebral artery originating directly from the aortic arch which is a normal anatomic variant. CAROTID ARTERIES: The common carotid arteries are patent bilaterally.   There is minimal atherosclerotic disease in the mid common carotid arteries bilaterally without any significant narrowing. There is atherosclerotic calcification and approximately 50-60% narrowing of the left internal carotid artery just distal to the bulb. There is atherosclerotic disease in the proximal bulb with approximately 30% narrowing of the vessel. The left internal carotid artery is then patent through the skull base. There is atherosclerotic calcification in the region of the right internal carotid artery bulb with approximately 20% stenosis of the vessel. The right internal carotid artery is then patent through the skull base. VERTEBRAL ARTERIES: The right vertebral artery is dominant and patent in its visualized course. The left vertebral artery originates directly from the aortic arch and is patent in its visualized course. SOFT TISSUES: There is no acute abnormality of the visualized soft tissues. BONES: There are mild degenerative changes of the cervical spine. CTA HEAD: ANTERIOR CIRCULATION:  No abnormality of the internal carotid arteries, middle cerebral arteries, or anterior cerebral arteries are identified. POSTERIOR CIRCULATION:  No abnormality of the distal vertebral arteries, basilar artery, or posterior cerebral arteries are identified. Incidental note is made of fetal origin of the right posterior cerebral artery which is a normal anatomic variant. No obvious aneurysms are identified. OTHER: No dural venous sinus thrombosis on this non-dedicated study. Approximately 30% stenosis of the left internal carotid artery in the proximal left carotid bulb and approximately 50-60% stenosis of the left internal carotid artery just distal to the bulb. Atherosclerotic calcification of the right internal carotid artery with approximately 20% stenosis in the region of the bulb. The left vertebral artery originates directly from the aortic arch which is a normal anatomic variant. No significant abnormality of the intracranial circulation.   Incidental note is made of fetal origin of the right posterior cerebral artery which is a normal anatomic variant. Mri Limited Brain    Result Date: 7/29/2020  EXAMINATION: MRI OF THE BRAIN WITHOUT CONTRAST  7/29/2020 3:00 pm TECHNIQUE: Multiplanar multisequence MRI of the brain was performed without the administration of intravenous contrast. COMPARISON: CT head 01/29/2020 HISTORY: ORDERING SYSTEM PROVIDED HISTORY: add GRE. Evalaute for stroke TECHNOLOGIST PROVIDED HISTORY: add GRE. Evboom for stroke Initial evaluation FINDINGS: INTRACRANIAL STRUCTURES/VENTRICLES: There is a punctate area of restricted diffusion measuring 3 mm medially in the posterior left frontal lobe along the falx that is suggestive of an acute infarct or subacute infarct. There is high signal in some of the sulci over the high right parietal lobe with corresponding low signal on the gradient echo images suggestive of subarachnoid hemorrhage. There is subarachnoid hemorrhage in the pre frontal sulcus. There is diffuse cerebral atrophy. There are chronic small vessel ischemic changes. There are several punctate areas of low signal scattered throughout the brain on the gradient echo images suggestive of previous areas of microhemorrhage. The findings were discussed with Dr. Tiffany Calixto, 07/29/2020 at 3:18 p.m. Punctate 3 mm acute to subacute infarct in the posterior right frontal lobe adjacent to the falx. No other areas of restricted diffusion. There is subarachnoid hemorrhage over the high right parietal lobe and posterior right frontal lobe. Cerebral atrophy. Chronic small vessel ischemic changes. RECENT VITALS:     Temp: 98.2 °F (36.8 °C),  Pulse: 73, Resp: 19, BP: (!) 180/90, SpO2: 97 %      This patient is a 81 y.o. Male with came in today with left-sided deficits. Stroke alert was called. Found to have a subarachnoid hemorrhage as well as an ischemic stroke. Started on a Cardene drip with blood pressure goal less than 160. Given fentanyl as well. Both trauma surgery and neurosurgery have been involved. Awaiting transfer to floor. ED Course as of Jul 29 1902   Wed Jul 29, 2020   1423 Troponin, High Sensitivity(!): 32 [EM]   3185 CT head Noncon shows questionable subtle hyperattenuation over the right frontal convexity which is suspicious for a tiny subarachnoid hemorrhage -neurology will get stat MRI    [EM]   1515 CTA:  30% stenosis of the left internal carotid artery   50-60% stenosis of the left  internal carotid artery just distal to the bulb  Atherosclerotic calcification of the right internal carotid artery with  approximately 20% stenosis in the region of the bulb    [EM]   1517 BP(!): 189/84 [EM]   1525 MRI brain subarachnoid hemorrhage over the high right parietal lobe and posterior right frontal lobe. Will not give TPA    [EM]   1750 BP(!): 203/102 [EM]   6063 Patient's BP still in 200s after a bolus of 10 mg of labetalol, will start him on nicardipine drip, blood pressure goal is less than 160 due to hemorrhagic stroke    [EM]   1839 BP(!): 180/90 [EM]      ED Course User Index  [EM] Claudette Mcgrath MD       OUTSTANDING TASKS / RECOMMENDATIONS:    1. Maintain SBP less than 106 mmHg  2. Awaiting transfer to floor     FINAL IMPRESSION:     1.  Cerebrovascular accident (CVA), unspecified mechanism ([facility] [address]        DISPOSITION:         DISPOSITION:  []  Discharge   []  Transfer -    [x]  Admission -  trauma   []  Against Medical Advice   []  Eloped   FOLLOW-UP: Evelio Hendrix MD  [facility] [phone]           DISCHARGE MEDICATIONS: New Prescriptions    No medications on file          Salina Eisenmenger, DO  Emergency Medicine Resident  [address] [place] [address]      Salina Eisenmenger, [address] [place] [address]  Resident  07/30/20 8233

## 2020-07-29 NOTE — ED TRIAGE NOTES
Last known well 1210. 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 Report of dent in drywall by EMS> EMS reports that patient initially was called as a lift assist and refused treatment. Alert and oriented x 4 per EMS on scene. Patient unable to lift left arm to sign AMA paperwork for EMS. L sided leg weakness, L arm weakness, and L facial droop noted by EMS. Reportedly required having family come to scene to convince patient to go to ER. Blood sugar on scene 84.      Patient taken to CT scanner upon arrival.

## 2020-07-29 NOTE — CONSULTS
Department of Neurosurgery                                            Nurse Practitioner Consult Note      Reason for Consult:  [facility]  Requesting Physician:  Dr Jose Pierre  Neurosurgeon:   [] Dr. Bryan Agarwal  [] Dr. Naseem Haines  [x] Dr. Marzena Stone  [] Dr. Arelis Joshua      History Obtained From:  patient, family member - daughter, electronic medical record    CHIEF COMPLAINT:         Chief Complaint   Patient presents with    Altered Mental Status     Last known well 1210       HISTORY OF PRESENT ILLNESS:       The patient is a 81 y.o. male who presents after fall from standing height. He was walking to the door to get his meals on wheels when he fell forward and hit his head. Denies LOC. He does have history of parkinsons but lives independently with help from his daughter. Uses a walker. Denies taking any blood thinning medications. Complaining of worsening neck pain. He normally has chronic neck pain that he has gotten injections in by pain management at [facility] about 5 years ago that did not help much. Denies any arm and leg weakness prior to admission. He does have left deltoid weakness. Denies any numbness and pain radiating into arms and legs.      PAST MEDICAL HISTORY :       Past Medical History:        Diagnosis Date    BPH (benign prostatic hyperplasia)     Cancer ([facility] [address]     basal cell carcinoma    Cervical spondylolysis     Depression     Hyperlipidemia     Hypertension     Neck pain     Neuropathy     Orofacial dyskinesia     Osteoarthritis     Parkinson's disease ([facility] [address]        Past Surgical History:        Procedure Laterality Date    JOINT REPLACEMENT         Social History:   Social History     Socioeconomic History    Marital status: Single     Spouse name: Not on file    Number of children: Not on file    Years of education: Not on file    Highest education level: Not on file   Occupational History    Not on file   Social Needs    Financial resource strain: Not on file    Food insecurity     Worry: Historical Provider, MD   primidone (MYSOLINE) 50 MG tablet Take 100 mg by mouth 2 times daily   Yes Historical Provider, MD   amantadine (SYMMETREL) 100 MG capsule Take 100 mg by mouth 2 times daily   Yes Historical Provider, MD   UNABLE TO FIND Med to replace flomax. Starts with dox? Yes Historical Provider, MD   doxazosin (CARDURA) 2 MG tablet Take 2 mg by mouth nightly   Yes Historical Provider, MD   propranolol (INDERAL) 40 MG tablet Take 40 mg by mouth 2 times daily 60+40 BID   Yes Historical Provider, MD   atorvastatin (LIPITOR) 10 MG tablet Take 10 mg by mouth daily   Yes Historical Provider, MD   ibuprofen (ADVIL;MOTRIN) 600 MG tablet Take 600 mg by mouth 2 times daily   Yes Historical Provider, MD       Current Medications:   Current Facility-Administered Medications: sodium chloride flush 0.9 % injection 10 mL, 10 mL, Intravenous, 2 times per day  sodium chloride flush 0.9 % injection 10 mL, 10 mL, Intravenous, PRN  dextrose 50 % IV solution, 12.5 g, Intravenous, Once PRN  labetalol (NORMODYNE;TRANDATE) injection 5 mg, 5 mg, Intravenous, PRN  labetalol (NORMODYNE;TRANDATE) injection 5 mg, 5 mg, Intravenous, Once    REVIEW OF SYSTEMS:       CONSTITUTIONAL: negative for fatigue and malaise   RESPIRATORY: negative for cough, shortness of breath   CARDIOVASCULAR: negative for chest pain, palpitations   GASTROINTESTINAL: negative for nausea, vomiting   GENITOURINARY: negative for incontinence, chronic indwelling levy   MUSCULOSKELETAL: Positive for neck or back pain     Review of systems otherwise negative.     PHYSICAL EXAM:       BP (!) 203/102   Pulse 72   Temp 98.2 °F (36.8 °C) (Oral)   Resp 19   Ht 5' 6\" (1.676 m)   Wt 162 lb (73.5 kg)   SpO2 97%   BMI 26.15 kg/m²       CONSTITUTIONAL: no apparent distress, appears stated age   HEAD: normocephalic, atraumatic   ENT: moist mucous membranes, uvula midline   NECK: supple, symmetric, midline tenderness to palpation   NEUROLOGIC:  EYE OPENING Spontaneous - 4 [x]       To voice - 3 []       To pain - 2 []       None - 1 []    VERBAL RESPONSE     Appropriate, oriented - 5 [x]       Dazed or confused - 4 []       Syllables, expletives - 3 []       Grunts - 2 []       None - 1 []    MOTOR RESPONSE     Spontaneous, command - 6 [x]       Localizes pain - 5 []       Withdraws pain - 4 []       Abnormal flexion - 3 []       Abnormal extension - 2 []       None - 1 []            Total GCS: 15    Mental Status:  Alert and oriented x3, follows all commands, slow to respond verbally, speech clear               Cranial Nerves:    cranial nerves II-XII are grossly intact    Motor Exam:    Drift:  present - right arm  Tone:  normal    Motor exam is 5 out of 5 all extremities with the exception of right deltoid 4/5    Sensory:    Right Upper Extremity:  normal  Left Upper Extremity:  normal  Right Lower Extremity:  normal  Left Lower Extremity:  normal    Deep Tendon Reflexes:    Right Bicep:  2+  Left Bicep:  2+  Right Knee:  2+  Left Knee:  2+    Plantar Response:    Right:  downgoing  Left:  downgoing    Clonus:  absent  Canchola's:  absent       LABS AND IMAGING:     CBC with Differential:    Lab Results   Component Value Date    WBC 5.7 07/29/2020    RBC 3.62 07/29/2020    HGB 12.0 07/29/2020    HCT 36.6 07/29/2020    PLT See Reflexed IPF Result 07/29/2020    .1 07/29/2020    MCH 33.1 07/29/2020    MCHC 32.8 07/29/2020    RDW 14.0 07/29/2020    LYMPHOPCT 18 07/29/2020    MONOPCT 14 07/29/2020    BASOPCT 0 07/29/2020    MONOSABS 0.79 07/29/2020    LYMPHSABS 1.02 07/29/2020    EOSABS 0.13 07/29/2020    BASOSABS <0.03 07/29/2020    DIFFTYPE NOT REPORTED 07/29/2020     BMP:    Lab Results   Component Value Date     07/29/2020    K 4.1 07/29/2020    CL 99 07/29/2020    CO2 25 07/29/2020    BUN 16 07/29/2020    CREATININE 0.51 07/29/2020    CALCIUM 8.6 07/29/2020    GFRAA >60 07/29/2020    LABGLOM >60 07/29/2020    GLUCOSE 81 07/29/2020       Radiology Review:      Ct Head Wo Contrast    Result Date: 7/29/2020  EXAMINATION: CT OF THE HEAD WITHOUT CONTRAST,  7/29/2020 1:40 pm TECHNIQUE: CT of the head was performed without the administration of intravenous contrast. Dose modulation, iterative reconstruction, and/or weight based adjustment of the mA/kV was utilized to reduce the radiation dose to as low as reasonably achievable. COMPARISON: None HISTORY: ORDERING SYSTEM PROVIDED HISTORY: Stroke, L sided weakness. LKW 1210 pm TECHNOLOGIST PROVIDED HISTORY: Stroke, L sided weakness. LKW 1210 pm Reason for Exam: Stroke, L sided weakness. LKW 1210 pm Acuity: Unknown Type of Exam: Unknown Initial evaluation FINDINGS: BRAIN/VENTRICLES: There is mild cerebral atrophy. There is atherosclerotic calcification of the cavernous carotid arteries. There are areas of hypoattenuation in the periventricular white matter and centrum semiovale that are likely related to chronic small vessel ischemic disease. There is a focal area of hypoattenuation in the left frontal lobe that likely represents an infarct of indeterminate age. There is no midline shift or mass effect. There is a subtle questionable area of hyperdensity in one of the sulci over the high right parietal region. While this may be artifact, very subtle subarachnoid hemorrhage cannot be excluded. ORBITS: The visualized portion of the orbits demonstrate no acute abnormality. SINUSES:  The visualized paranasal sinuses and mastoid air cells are clear. SOFT TISSUES/SKULL:  No acute abnormality of the visualized skull or soft tissues. Cerebral atrophy. Chronic small vessel ischemic changes. Area of hypoattenuation in the left frontal periventricular white matter which likely represents an infarct of indeterminate age. Questionable subtle hyperattenuation over the right frontal convexity which is suspicious for a tiny subarachnoid hemorrhage. Report was called and discussed with Dr. Joan Harding, 07/29/2020 at 2:16 p.m.      [facility] Chest Portable    Result Date: 7/29/2020  EXAMINATION: ONE XRAY VIEW OF THE CHEST 7/29/2020 3:56 pm COMPARISON: None. HISTORY: ORDERING SYSTEM PROVIDED HISTORY: pCO2 high, no SOB TECHNOLOGIST PROVIDED HISTORY: pCO2 high, no SOB FINDINGS: No focal consolidation. Mild cardiomegaly. No pulmonary edema. Calcified granuloma. No acute findings. Cta Head Neck W Contrast    Result Date: 7/29/2020  EXAMINATION: CTA OF THE HEAD AND NECK WITH CONTRAST 7/29/2020 1:40 pm: TECHNIQUE: CTA of the head and neck was performed with the administration of intravenous contrast. Multiplanar reformatted images are provided for review. MIP images are provided for review. Stenosis of the internal carotid arteries measured using NASCET criteria. Dose modulation, iterative reconstruction, and/or weight based adjustment of the mA/kV was utilized to reduce the radiation dose to as low as reasonably achievable. COMPARISON: None. HISTORY: ORDERING SYSTEM PROVIDED HISTORY: stroke symptoms, L sided weakness, LKW 1210pm TECHNOLOGIST PROVIDED HISTORY: stroke symptoms, L sided weakness, LKW 1210pm Reason for Exam: stroke, L sided weakness. LKW 1210pm Acuity: Unknown Type of Exam: Unknown Initial evaluation. FINDINGS: CTA NECK: AORTIC ARCH/ARCH VESSELS: No significant abnormality of the aortic arch is identified. Incidental note is made of the left vertebral artery originating directly from the aortic arch which is a normal anatomic variant. CAROTID ARTERIES: The common carotid arteries are patent bilaterally. There is minimal atherosclerotic disease in the mid common carotid arteries bilaterally without any significant narrowing. There is atherosclerotic calcification and approximately 50-60% narrowing of the left internal carotid artery just distal to the bulb. There is atherosclerotic disease in the proximal bulb with approximately 30% narrowing of the vessel. The left internal carotid artery is then patent through the skull base. ORDERING SYSTEM PROVIDED HISTORY: add GRE. Evalaute for stroke TECHNOLOGIST PROVIDED HISTORY: add GRE. Evalaute for stroke Initial evaluation FINDINGS: INTRACRANIAL STRUCTURES/VENTRICLES: There is a punctate area of restricted diffusion measuring 3 mm medially in the posterior left frontal lobe along the falx that is suggestive of an acute infarct or subacute infarct. There is high signal in some of the sulci over the high right parietal lobe with corresponding low signal on the gradient echo images suggestive of subarachnoid hemorrhage. There is subarachnoid hemorrhage in the pre frontal sulcus. There is diffuse cerebral atrophy. There are chronic small vessel ischemic changes. There are several punctate areas of low signal scattered throughout the brain on the gradient echo images suggestive of previous areas of microhemorrhage. The findings were discussed with Dr. Alana Claude, 07/29/2020 at 3:18 p.m. Punctate 3 mm acute to subacute infarct in the posterior right frontal lobe adjacent to the falx. No other areas of restricted diffusion. There is subarachnoid hemorrhage over the high right parietal lobe and posterior right frontal lobe. Cerebral atrophy. Chronic small vessel ischemic changes. ASSESSMENT AND PLAN:       Patient Active Problem List   Diagnosis    Acute ischemic stroke ([address]    SAH (subarachnoid hemorrhage) ([facility])         A/P:  This is a 81 y.o. male with very tiny subarachnoid hemorrhage after fall from standing. Patient care will be discussed with attending, will reevaluated patient along with attending.      - No neurosurgical interventions needed  - CTLS recommendations: clear prior to my exam   - Neuro checks    - admit from stepdown from NS standpoint  - Hold all antiplatelets and anticoagulants for tonight  - f/u CT cervical, thoracic and lumbar spine    Additional recommendations may follow    Please contact neurosurgery with any changes in patients neurologic status.      Thank you for your consult.        AARON Rosas CNP pager [phone]  7/29/2020  6:03 PM

## 2020-07-29 NOTE — H&P
TRAUMA HISTORY AND PHYSICAL EXAMINATION    PATIENT NAME: Farnaz Willis  YOB: 1935  MEDICAL RECORD NO. [id number]   DATE: 7/29/2020  PRIMARY CARE PHYSICIAN: Sue Banda MD  PATIENT EVALUATED AT THE REQUEST OF : Levon Lizarraga    ACTIVATION   []Trauma Alert     [] Trauma Priority     [x]Trauma Consult. IMPRESSION:     There is no problem list on file for this patient. MEDICAL DECISION MAKING AND PLAN:     1. Subarachnoid hemorrhage   Neurosurgery consult, awaiting recommendations   Plan to admit  2. Plan to finish CT scanning to assess for other traumatic injuries     CONSULT SERVICES    [x] Neurosurgery     [] Orthopedic Surgery    [] Cardiothoracic     [] Facial Trauma    [] Plastic Surgery (Burn)    [] Pediatric Surgery     [] Internal Medicine    [] Pulmonary Medicine    [x] Other: Neurology      HISTORY:     SOURCE OF INFORMATION  Patient information was obtained from patient. History/Exam limitations: none. INJURY SUMMARY  Subarachnoid hemorrhage    GENERAL DATA  Age 81 y.o.  male   Patient information was obtained from patient. History/Exam limitations: none. Patient presented to the Emergency Department by EMS  Injury Date: 7/29/2020   Approximate Injury Time: 1500        Transport mode:   []Ambulance      [] Helicopter     []Car       [] Other  Referring Hospital:     [address], (e.g., home, farm, industry, street)  Specific Details of Location (e.g., bedroom, kitchen, garage): home  Type of Residence (if occurred in home setting) (e.g., apartment, mobile home, single family home): MECHANISM OF INJURY  [x] Fall    [x]From Standing     []From Height  Ft     []Down Stairs ___steps    HISTORY:     Farnaz Willis is a 81 y.o. male that presented to the Emergency Department following fall from standing height. Patient states he went to go get a delivery at home today, when he fell and struck his head on the wall. His daughter reports that the wall was dented.   Patient states he did not lose consciousness denies anticoagulation. He was found to have left facial droop, left upper extremity weakness and due to concern for stroke he had CT head without contrast.  Neurology plan to give him TPA and so he underwent emergent brain MRI which showed a small subarachnoid hemorrhage over the high right parietal lobe and posterior right frontal lobe. Loss of Consciousness [x]No   []Yes Duration(min)       [] Unknown     Total Fluids Given Prior To Arrival  cc    MEDICATIONS:   []  None     []  Information not available due to exam limitations documented above  Prior to Admission medications    Medication Sig Start Date End Date Taking? Authorizing Provider   carbidopa-levodopa (SINEMET CR)  MG per extended release tablet Take 1 tablet by mouth 2 times daily   Yes Historical Provider, MD   carbidopa (LODOSYN) 25 MG TABS tablet Take by mouth 3 at 7, 3 at 11, 2 4 pm 2 at 7   Yes Historical Provider, MD   citalopram (CELEXA) 20 MG tablet Take 20 mg by mouth daily   Yes Historical Provider, MD   propranolol (INDERAL LA) 120 MG extended release capsule Take 120 mg by mouth daily   Yes Historical Provider, MD   propranolol (INDERAL LA) 60 MG extended release capsule Take 60 mg by mouth daily   Yes Historical Provider, MD   finasteride (PROSCAR) 5 MG tablet Take 5 mg by mouth daily   Yes Historical Provider, MD   docusate sodium (COLACE) 100 MG capsule Take 100 mg by mouth 2 times daily   Yes Historical Provider, MD   primidone (MYSOLINE) 50 MG tablet Take 100 mg by mouth 2 times daily   Yes Historical Provider, MD   amantadine (SYMMETREL) 100 MG capsule Take 100 mg by mouth 2 times daily   Yes Historical Provider, MD   UNABLE TO FIND Med to replace flomax. Starts with dox?    Yes Historical Provider, MD   doxazosin (CARDURA) 2 MG tablet Take 2 mg by mouth nightly   Yes Historical Provider, MD   propranolol (INDERAL) 40 MG tablet Take 40 mg by mouth 2 times daily 60+40 BID   Yes Historical Provider, MD   atorvastatin (LIPITOR) 10 MG tablet Take 10 mg by mouth daily   Yes Historical Provider, MD   ibuprofen (ADVIL;MOTRIN) 600 MG tablet Take 600 mg by mouth 2 times daily   Yes Historical Provider, MD       ALLERGIES:   []  None    []   Information not available due to exam limitations documented above   Patient has no known allergies. PAST MEDICAL HISTORY: []  None   []   Information not available due to exam limitations documented above    has a past medical history of BPH (benign prostatic hyperplasia), Cancer ([facility] [address] Cervical spondylolysis, Depression, Hyperlipidemia, Hypertension, Neck pain, Neuropathy, Orofacial dyskinesia, Osteoarthritis, and Parkinson's disease ([facility] [address] has a past surgical history that includes joint replacement. FAMILY HISTORY   []   Information not available due to exam limitations documented above    family history is not on file. SOCIAL HISTORY  []   Information not available due to exam limitations documented above     has no history on file for tobacco.   has no history on file for alcohol.   has no history on file for drug.     PERTINENT SYSTEMIC REVIEW:    []   Information not available due to exam limitations documented above    GEN: no malaise, fatigue  NEURO: positive headache, positive weakness, no parasthesias  HEENT: no vision changes or facial trauma  CVS: no chest pain or palpitations  PULM: no cough, dyspnea or wheezing  GI: no nausea, vomiting or abdominal pain  MUSK: no neck or back pain  HEME: no anticoagulation or easy bruising       PHYSICAL EXAMINATION:     GLASCOW COMA SCALE  NEUROMUSCULAR BLOCKADE PRIOR TO ARRIVAL     [x]No        []Yes      Variable  Score   Variable  Score  Eye opening [x]Spontaneous 4 Verbal  [x]Oriented  5     []To voice  3   []Confused  4    []To pain  2   []Inapp words  3    []None  1   []Incomp words 2       []None  1   Motor   [x]Obeys  6    []Localizes pain 5    []Withdraws(pain) 4    []Flexion(pain) 3  []Extension(pain) 2    []None  1     GCS Total = 15    PHYSICAL EXAMINATION    VITAL SIGNS:   Vitals:    07/29/20 1547   BP: (!) 167/100   Pulse: 71   Resp: 21   Temp:    SpO2: 97%       General Appearance: AAOx3, conversant  Skin: warm and dry, no rash or erythema   Head: normocephalic and atraumatic   Eyes: PERRLA, EOMI  Ears: tympanic membrane clear bilaterally, external ear canals wnl  Nose: nose without deformity  Neck:no cervical tenderness  Back: no abrasion, step offs, or thoraco-lumbar tenderness  Pulmonary/Chest: CTAB, good air entry bilaterally  Cardiovascular: normal rate, regular rhythm  Abdomen: soft, non-tender, non-distended   Pelvic: Stable   Extremities: no cyanosis, edema or gross deformity  Neurologic: moving all extremities, 5/5 strength right upper extremity, bilateral lower extremities. There is decreased  strength in the left hand. Patient does have left-sided facial droop. FOCUSED ABDOMINAL SONOGRAM FOR TRAUMA (FAST): A good  quality examination was performed by Dr. Nathen Núñez and representative images were obtained. [x] No free fluid in the abdomen         RADIOLOGY    Ct Head Wo Contrast    Result Date: 7/29/2020  EXAMINATION: CT OF THE HEAD WITHOUT CONTRAST,  7/29/2020 1:40 pm TECHNIQUE: CT of the head was performed without the administration of intravenous contrast. Dose modulation, iterative reconstruction, and/or weight based adjustment of the mA/kV was utilized to reduce the radiation dose to as low as reasonably achievable. COMPARISON: None HISTORY: ORDERING SYSTEM PROVIDED HISTORY: Stroke, L sided weakness. LKW 1210 pm TECHNOLOGIST PROVIDED HISTORY: Stroke, L sided weakness. LKW 1210 pm Reason for Exam: Stroke, L sided weakness. LKW 1210 pm Acuity: Unknown Type of Exam: Unknown Initial evaluation FINDINGS: BRAIN/VENTRICLES: There is mild cerebral atrophy.   There is atherosclerotic calcification of the cavernous carotid arteries. There are areas of hypoattenuation in the periventricular white matter and centrum semiovale that are likely related to chronic small vessel ischemic disease. There is a focal area of hypoattenuation in the left frontal lobe that likely represents an infarct of indeterminate age. There is no midline shift or mass effect. There is a subtle questionable area of hyperdensity in one of the sulci over the high right parietal region. While this may be artifact, very subtle subarachnoid hemorrhage cannot be excluded. ORBITS: The visualized portion of the orbits demonstrate no acute abnormality. SINUSES:  The visualized paranasal sinuses and mastoid air cells are clear. SOFT TISSUES/SKULL:  No acute abnormality of the visualized skull or soft tissues. Cerebral atrophy. Chronic small vessel ischemic changes. Area of hypoattenuation in the left frontal periventricular white matter which likely represents an infarct of indeterminate age. Questionable subtle hyperattenuation over the right frontal convexity which is suspicious for a tiny subarachnoid hemorrhage. Report was called and discussed with Dr. Adryan Mcleod, 07/29/2020 at 2:16 p.m. Xr Chest Portable    Result Date: 7/29/2020  EXAMINATION: ONE XRAY VIEW OF THE CHEST 7/29/2020 3:56 pm COMPARISON: None. HISTORY: ORDERING SYSTEM PROVIDED HISTORY: pCO2 high, no SOB TECHNOLOGIST PROVIDED HISTORY: pCO2 high, no SOB FINDINGS: No focal consolidation. Mild cardiomegaly. No pulmonary edema. Calcified granuloma. No acute findings. Cta Head Neck W Contrast    Result Date: 7/29/2020  EXAMINATION: CTA OF THE HEAD AND NECK WITH CONTRAST 7/29/2020 1:40 pm: TECHNIQUE: CTA of the head and neck was performed with the administration of intravenous contrast. Multiplanar reformatted images are provided for review. MIP images are provided for review. Stenosis of the internal carotid arteries measured using NASCET criteria.  Dose modulation, iterative reconstruction, and/or weight based adjustment of the mA/kV was utilized to reduce the radiation dose to as low as reasonably achievable. COMPARISON: None. HISTORY: ORDERING SYSTEM PROVIDED HISTORY: stroke symptoms, L sided weakness, LKW 1210pm TECHNOLOGIST PROVIDED HISTORY: stroke symptoms, L sided weakness, LKW 1210pm Reason for Exam: stroke, L sided weakness. LKW 1210pm Acuity: Unknown Type of Exam: Unknown Initial evaluation. FINDINGS: CTA NECK: AORTIC ARCH/ARCH VESSELS: No significant abnormality of the aortic arch is identified. Incidental note is made of the left vertebral artery originating directly from the aortic arch which is a normal anatomic variant. CAROTID ARTERIES: The common carotid arteries are patent bilaterally. There is minimal atherosclerotic disease in the mid common carotid arteries bilaterally without any significant narrowing. There is atherosclerotic calcification and approximately 50-60% narrowing of the left internal carotid artery just distal to the bulb. There is atherosclerotic disease in the proximal bulb with approximately 30% narrowing of the vessel. The left internal carotid artery is then patent through the skull base. There is atherosclerotic calcification in the region of the right internal carotid artery bulb with approximately 20% stenosis of the vessel. The right internal carotid artery is then patent through the skull base. VERTEBRAL ARTERIES: The right vertebral artery is dominant and patent in its visualized course. The left vertebral artery originates directly from the aortic arch and is patent in its visualized course. SOFT TISSUES: There is no acute abnormality of the visualized soft tissues. BONES: There are mild degenerative changes of the cervical spine. CTA HEAD: ANTERIOR CIRCULATION:  No abnormality of the internal carotid arteries, middle cerebral arteries, or anterior cerebral arteries are identified.  POSTERIOR CIRCULATION:  No abnormality of the distal vertebral arteries, basilar artery, or posterior cerebral arteries are identified. Incidental note is made of fetal origin of the right posterior cerebral artery which is a normal anatomic variant. No obvious aneurysms are identified. OTHER: No dural venous sinus thrombosis on this non-dedicated study. Approximately 30% stenosis of the left internal carotid artery in the proximal left carotid bulb and approximately 50-60% stenosis of the left internal carotid artery just distal to the bulb. Atherosclerotic calcification of the right internal carotid artery with approximately 20% stenosis in the region of the bulb. The left vertebral artery originates directly from the aortic arch which is a normal anatomic variant. No significant abnormality of the intracranial circulation. Incidental note is made of fetal origin of the right posterior cerebral artery which is a normal anatomic variant. Mri Limited Brain    Punctate 3 mm acute to subacute infarct in the posterior right frontal lobe adjacent to the falx. No other areas of restricted diffusion. There is subarachnoid hemorrhage over the high right parietal lobe and posterior right frontal lobe. Cerebral atrophy. Chronic small vessel ischemic changes.            LABS    Labs Reviewed   STROKE PANEL - Abnormal; Notable for the following components:       Result Value    CREATININE 0.51 (*)     RBC 3.62 (*)     Hemoglobin 12.0 (*)     Hematocrit 36.6 (*)     Lymphocytes 18 (*)     Monocytes 14 (*)     Absolute Lymph # 1.02 (*)     PTT 18.7 (*)     Troponin, High Sensitivity 32 (*)     All other components within normal limits   HGB/HCT - Abnormal; Notable for the following components:    POC Hemoglobin 12.3 (*)     POC Hematocrit 36 (*)     All other components within normal limits   IMMATURE PLATELET FRACTION - Abnormal; Notable for the following components:    Platelet, Fluorescence 135 (*)     All other components within normal limits   VENOUS BLOOD GAS, POINT OF CARE - Abnormal; Notable for the following components:    pCO2, Demond 52.2 (*)     HCO3, Venous 32.4 (*)     Total CO2, Venous 34 (*)     Positive Base Excess, Deomnd 6 (*)     All other components within normal limits   SODIUM (POC)   POTASSIUM (POC)   CHLORIDE (POC)   CALCIUM, IONIC (POC)   TSH WITH REFLEX   HEMOGLOBIN A1C   POC BLOOD GAS AND CHEMISTRY   CREATININE W/GFR POINT OF CARE   LACTIC ACID,POINT OF CARE   POCT GLUCOSE   ANION GAP ([address] [facility]) POC         Bentley Bamberger, DO  7/29/20, 4:19 PM

## 2020-07-30 ENCOUNTER — APPOINTMENT (OUTPATIENT)
Dept: MRI IMAGING | Age: 85
DRG: 957 | End: 2020-07-30
Payer: COMMERCIAL

## 2020-07-30 ENCOUNTER — APPOINTMENT (OUTPATIENT)
Dept: GENERAL RADIOLOGY | Age: 85
DRG: 957 | End: 2020-07-30
Payer: COMMERCIAL

## 2020-07-30 PROBLEM — I63.9 ACUTE CEREBRAL INFARCTION (HCC): Status: ACTIVE | Noted: 2020-07-30

## 2020-07-30 PROBLEM — G20 PARKINSON DISEASE (HCC): Status: ACTIVE | Noted: 2020-07-30

## 2020-07-30 PROBLEM — G81.94 LEFT HEMIPARESIS (HCC): Status: ACTIVE | Noted: 2020-07-30

## 2020-07-30 LAB
ABSOLUTE EOS #: 0.06 K/UL (ref 0–0.44)
ABSOLUTE IMMATURE GRANULOCYTE: <0.03 K/UL (ref 0–0.3)
ABSOLUTE LYMPH #: 0.92 K/UL (ref 1.1–3.7)
ABSOLUTE MONO #: 0.71 K/UL (ref 0.1–1.2)
ANION GAP SERPL CALCULATED.3IONS-SCNC: 15 MMOL/L (ref 9–17)
BASOPHILS # BLD: 1 % (ref 0–2)
BASOPHILS ABSOLUTE: 0.04 K/UL (ref 0–0.2)
BUN BLDV-MCNC: 10 MG/DL (ref 8–23)
BUN/CREAT BLD: ABNORMAL (ref 9–20)
CALCIUM SERPL-MCNC: 8.9 MG/DL (ref 8.6–10.4)
CHLORIDE BLD-SCNC: 92 MMOL/L (ref 98–107)
CHOLESTEROL/HDL RATIO: 2.2
CHOLESTEROL: 146 MG/DL
CO2: 24 MMOL/L (ref 20–31)
CREAT SERPL-MCNC: 0.43 MG/DL (ref 0.7–1.2)
DIFFERENTIAL TYPE: ABNORMAL
EKG ATRIAL RATE: 62 BPM
EKG ATRIAL RATE: 79 BPM
EKG P AXIS: 14 DEGREES
EKG P AXIS: 34 DEGREES
EKG P-R INTERVAL: 196 MS
EKG P-R INTERVAL: 208 MS
EKG Q-T INTERVAL: 398 MS
EKG Q-T INTERVAL: 452 MS
EKG QRS DURATION: 72 MS
EKG QRS DURATION: 88 MS
EKG QTC CALCULATION (BAZETT): 456 MS
EKG QTC CALCULATION (BAZETT): 458 MS
EKG R AXIS: -9 DEGREES
EKG T AXIS: -3 DEGREES
EKG T AXIS: -8 DEGREES
EKG VENTRICULAR RATE: 62 BPM
EKG VENTRICULAR RATE: 79 BPM
EOSINOPHILS RELATIVE PERCENT: 1 % (ref 1–4)
ESTIMATED AVERAGE GLUCOSE: 103 MG/DL
GFR AFRICAN AMERICAN: >60 ML/MIN
GFR NON-AFRICAN AMERICAN: >60 ML/MIN
GFR SERPL CREATININE-BSD FRML MDRD: ABNORMAL ML/MIN/{1.73_M2}
GFR SERPL CREATININE-BSD FRML MDRD: ABNORMAL ML/MIN/{1.73_M2}
GLUCOSE BLD-MCNC: 115 MG/DL (ref 70–99)
HBA1C MFR BLD: 5.2 % (ref 4–6)
HCT VFR BLD CALC: 42.2 % (ref 40.7–50.3)
HDLC SERPL-MCNC: 65 MG/DL
HEMOGLOBIN: 14.8 G/DL (ref 13–17)
IMMATURE GRANULOCYTES: 0 %
LDL CHOLESTEROL: 71 MG/DL (ref 0–130)
LV EF: 56 %
LVEF MODALITY: NORMAL
LYMPHOCYTES # BLD: 12 % (ref 24–43)
MAGNESIUM: 1.6 MG/DL (ref 1.6–2.6)
MCH RBC QN AUTO: 32.2 PG (ref 25.2–33.5)
MCHC RBC AUTO-ENTMCNC: 35.1 G/DL (ref 28.4–34.8)
MCV RBC AUTO: 91.9 FL (ref 82.6–102.9)
MONOCYTES # BLD: 9 % (ref 3–12)
NRBC AUTOMATED: 0 PER 100 WBC
PDW BLD-RTO: 13.2 % (ref 11.8–14.4)
PLATELET # BLD: ABNORMAL K/UL (ref 138–453)
PLATELET ESTIMATE: ABNORMAL
PLATELET, FLUORESCENCE: NORMAL K/UL (ref 138–453)
PMV BLD AUTO: ABNORMAL FL (ref 8.1–13.5)
POTASSIUM SERPL-SCNC: 3.5 MMOL/L (ref 3.7–5.3)
RBC # BLD: 4.59 M/UL (ref 4.21–5.77)
RBC # BLD: ABNORMAL 10*6/UL
SEG NEUTROPHILS: 77 % (ref 36–65)
SEGMENTED NEUTROPHILS ABSOLUTE COUNT: 5.78 K/UL (ref 1.5–8.1)
SODIUM BLD-SCNC: 131 MMOL/L (ref 135–144)
TRIGL SERPL-MCNC: 48 MG/DL
VLDLC SERPL CALC-MCNC: NORMAL MG/DL (ref 1–30)
WBC # BLD: 7.5 K/UL (ref 3.5–11.3)
WBC # BLD: ABNORMAL 10*3/UL

## 2020-07-30 PROCEDURE — 73130 X-RAY EXAM OF HAND: CPT

## 2020-07-30 PROCEDURE — 2580000003 HC RX 258: Performed by: STUDENT IN AN ORGANIZED HEALTH CARE EDUCATION/TRAINING PROGRAM

## 2020-07-30 PROCEDURE — 93005 ELECTROCARDIOGRAM TRACING: CPT | Performed by: SURGERY

## 2020-07-30 PROCEDURE — 6370000000 HC RX 637 (ALT 250 FOR IP): Performed by: PSYCHIATRY & NEUROLOGY

## 2020-07-30 PROCEDURE — 2500000003 HC RX 250 WO HCPCS: Performed by: STUDENT IN AN ORGANIZED HEALTH CARE EDUCATION/TRAINING PROGRAM

## 2020-07-30 PROCEDURE — 6360000002 HC RX W HCPCS: Performed by: STUDENT IN AN ORGANIZED HEALTH CARE EDUCATION/TRAINING PROGRAM

## 2020-07-30 PROCEDURE — 2580000003 HC RX 258: Performed by: INTERNAL MEDICINE

## 2020-07-30 PROCEDURE — 80048 BASIC METABOLIC PNL TOTAL CA: CPT

## 2020-07-30 PROCEDURE — 6370000000 HC RX 637 (ALT 250 FOR IP): Performed by: STUDENT IN AN ORGANIZED HEALTH CARE EDUCATION/TRAINING PROGRAM

## 2020-07-30 PROCEDURE — 97162 PT EVAL MOD COMPLEX 30 MIN: CPT

## 2020-07-30 PROCEDURE — 85055 RETICULATED PLATELET ASSAY: CPT

## 2020-07-30 PROCEDURE — 97535 SELF CARE MNGMENT TRAINING: CPT

## 2020-07-30 PROCEDURE — 2060000000 HC ICU INTERMEDIATE R&B

## 2020-07-30 PROCEDURE — 85025 COMPLETE CBC W/AUTO DIFF WBC: CPT

## 2020-07-30 PROCEDURE — 97166 OT EVAL MOD COMPLEX 45 MIN: CPT

## 2020-07-30 PROCEDURE — 92523 SPEECH SOUND LANG COMPREHEN: CPT

## 2020-07-30 PROCEDURE — 72141 MRI NECK SPINE W/O DYE: CPT

## 2020-07-30 PROCEDURE — 93010 ELECTROCARDIOGRAM REPORT: CPT | Performed by: INTERNAL MEDICINE

## 2020-07-30 PROCEDURE — 73630 X-RAY EXAM OF FOOT: CPT

## 2020-07-30 PROCEDURE — 83735 ASSAY OF MAGNESIUM: CPT

## 2020-07-30 PROCEDURE — 99222 1ST HOSP IP/OBS MODERATE 55: CPT | Performed by: PSYCHIATRY & NEUROLOGY

## 2020-07-30 PROCEDURE — 97530 THERAPEUTIC ACTIVITIES: CPT

## 2020-07-30 PROCEDURE — 93306 TTE W/DOPPLER COMPLETE: CPT

## 2020-07-30 PROCEDURE — 36415 COLL VENOUS BLD VENIPUNCTURE: CPT

## 2020-07-30 PROCEDURE — 83036 HEMOGLOBIN GLYCOSYLATED A1C: CPT

## 2020-07-30 PROCEDURE — 94761 N-INVAS EAR/PLS OXIMETRY MLT: CPT

## 2020-07-30 PROCEDURE — 80061 LIPID PANEL: CPT

## 2020-07-30 PROCEDURE — 73030 X-RAY EXAM OF SHOULDER: CPT

## 2020-07-30 RX ORDER — AMANTADINE HYDROCHLORIDE 100 MG/1
100 CAPSULE, GELATIN COATED ORAL 2 TIMES DAILY
Status: DISCONTINUED | OUTPATIENT
Start: 2020-07-30 | End: 2020-08-06 | Stop reason: HOSPADM

## 2020-07-30 RX ORDER — POTASSIUM CHLORIDE 20 MEQ/1
40 TABLET, EXTENDED RELEASE ORAL ONCE
Status: COMPLETED | OUTPATIENT
Start: 2020-07-30 | End: 2020-07-30

## 2020-07-30 RX ORDER — CITALOPRAM 20 MG/1
20 TABLET ORAL DAILY
Status: DISCONTINUED | OUTPATIENT
Start: 2020-07-30 | End: 2020-08-06 | Stop reason: HOSPADM

## 2020-07-30 RX ORDER — MAGNESIUM SULFATE 1 G/100ML
1 INJECTION INTRAVENOUS
Status: COMPLETED | OUTPATIENT
Start: 2020-07-30 | End: 2020-07-30

## 2020-07-30 RX ORDER — ATORVASTATIN CALCIUM 20 MG/1
20 TABLET, FILM COATED ORAL NIGHTLY
Status: DISCONTINUED | OUTPATIENT
Start: 2020-07-30 | End: 2020-08-06 | Stop reason: HOSPADM

## 2020-07-30 RX ORDER — 0.9 % SODIUM CHLORIDE 0.9 %
20 INTRAVENOUS SOLUTION INTRAVENOUS ONCE
Status: DISCONTINUED | OUTPATIENT
Start: 2020-07-30 | End: 2020-07-30

## 2020-07-30 RX ORDER — ATORVASTATIN CALCIUM 40 MG/1
40 TABLET, FILM COATED ORAL NIGHTLY
Status: DISCONTINUED | OUTPATIENT
Start: 2020-07-30 | End: 2020-07-30

## 2020-07-30 RX ORDER — CARBIDOPA AND LEVODOPA 25; 100 MG/1; MG/1
1 TABLET, EXTENDED RELEASE ORAL 2 TIMES DAILY
Status: DISCONTINUED | OUTPATIENT
Start: 2020-07-30 | End: 2020-07-31

## 2020-07-30 RX ORDER — LABETALOL 20 MG/4 ML (5 MG/ML) INTRAVENOUS SYRINGE
10 EVERY 4 HOURS PRN
Status: DISCONTINUED | OUTPATIENT
Start: 2020-07-30 | End: 2020-08-06 | Stop reason: HOSPADM

## 2020-07-30 RX ORDER — PRIMIDONE 50 MG/1
50 TABLET ORAL 2 TIMES DAILY
Status: DISCONTINUED | OUTPATIENT
Start: 2020-07-30 | End: 2020-08-06 | Stop reason: HOSPADM

## 2020-07-30 RX ADMIN — SODIUM CHLORIDE, PRESERVATIVE FREE 10 ML: 5 INJECTION INTRAVENOUS at 08:45

## 2020-07-30 RX ADMIN — PRIMIDONE 50 MG: 50 TABLET ORAL at 11:36

## 2020-07-30 RX ADMIN — ACETAMINOPHEN 1000 MG: 500 TABLET ORAL at 13:42

## 2020-07-30 RX ADMIN — METHOCARBAMOL TABLETS 750 MG: 750 TABLET, COATED ORAL at 08:45

## 2020-07-30 RX ADMIN — DEXTROSE MONOHYDRATE 2.5 MG/HR: 50 INJECTION, SOLUTION INTRAVENOUS at 00:35

## 2020-07-30 RX ADMIN — MAGNESIUM SULFATE HEPTAHYDRATE 1 G: 1 INJECTION, SOLUTION INTRAVENOUS at 10:40

## 2020-07-30 RX ADMIN — Medication 10 MG: at 21:05

## 2020-07-30 RX ADMIN — ATORVASTATIN CALCIUM 20 MG: 20 TABLET, FILM COATED ORAL at 21:16

## 2020-07-30 RX ADMIN — METHOCARBAMOL TABLETS 750 MG: 750 TABLET, COATED ORAL at 13:42

## 2020-07-30 RX ADMIN — CARBIDOPA AND LEVODOPA 1 TABLET: 25; 100 TABLET ORAL at 21:16

## 2020-07-30 RX ADMIN — SODIUM CHLORIDE, PRESERVATIVE FREE 10 ML: 5 INJECTION INTRAVENOUS at 21:16

## 2020-07-30 RX ADMIN — PRIMIDONE 50 MG: 50 TABLET ORAL at 21:16

## 2020-07-30 RX ADMIN — ACETAMINOPHEN 1000 MG: 500 TABLET ORAL at 21:16

## 2020-07-30 RX ADMIN — MAGNESIUM SULFATE HEPTAHYDRATE 1 G: 1 INJECTION, SOLUTION INTRAVENOUS at 11:59

## 2020-07-30 RX ADMIN — CARBIDOPA AND LEVODOPA 1 TABLET: 25; 100 TABLET, EXTENDED RELEASE ORAL at 21:16

## 2020-07-30 RX ADMIN — POTASSIUM CHLORIDE 40 MEQ: 1500 TABLET, EXTENDED RELEASE ORAL at 10:40

## 2020-07-30 RX ADMIN — CITALOPRAM 20 MG: 20 TABLET, FILM COATED ORAL at 11:36

## 2020-07-30 RX ADMIN — SENNOSIDES 8.6 MG: 8.6 TABLET, FILM COATED ORAL at 08:45

## 2020-07-30 RX ADMIN — AMANTADINE HYDROCHLORIDE 100 MG: 100 CAPSULE ORAL at 21:16

## 2020-07-30 RX ADMIN — CARBIDOPA AND LEVODOPA 1 TABLET: 25; 100 TABLET, EXTENDED RELEASE ORAL at 12:09

## 2020-07-30 RX ADMIN — METHOCARBAMOL TABLETS 750 MG: 750 TABLET, COATED ORAL at 21:16

## 2020-07-30 RX ADMIN — AMANTADINE HYDROCHLORIDE 100 MG: 100 CAPSULE ORAL at 11:36

## 2020-07-30 RX ADMIN — ACETAMINOPHEN 1000 MG: 500 TABLET ORAL at 06:40

## 2020-07-30 ASSESSMENT — PAIN SCALES - GENERAL
PAINLEVEL_OUTOF10: 5
PAINLEVEL_OUTOF10: 0
PAINLEVEL_OUTOF10: 10
PAINLEVEL_OUTOF10: 10
PAINLEVEL_OUTOF10: 8
PAINLEVEL_OUTOF10: 6
PAINLEVEL_OUTOF10: 5
PAINLEVEL_OUTOF10: 10

## 2020-07-30 ASSESSMENT — PAIN - FUNCTIONAL ASSESSMENT: PAIN_FUNCTIONAL_ASSESSMENT: 0-10

## 2020-07-30 ASSESSMENT — PAIN DESCRIPTION - DESCRIPTORS
DESCRIPTORS: DISCOMFORT
DESCRIPTORS: HEADACHE

## 2020-07-30 ASSESSMENT — PAIN DESCRIPTION - PAIN TYPE
TYPE: ACUTE PAIN

## 2020-07-30 ASSESSMENT — PAIN DESCRIPTION - LOCATION
LOCATION: HEAD

## 2020-07-30 NOTE — CARE COORDINATION
Case Management Initial Discharge Plan  [place],             Met with:patient to discuss discharge plans. Information verified: address, contacts, phone number, , insurance Yes    Emergency Contact/Next of Kin name & number: dwain Temple [phone]    PCP: Bert Avila MD  Date of last visit: 2 weeks ago    Insurance Provider: [place]    Discharge Planning    Living Arrangements:  Alone   Support Systems:  Friends/Neighbors, Children    Home has 1 stories  5 stairs to climb to get into front door, 0stairs to climb to reach second floor  Location of bedroom/bathroom in home main    Patient able to perform ADL's:Independent    Current Services (outpatient & in home) none  DME equipment: walker,cane, and W/C  DME provider: 0    Receiving oral anticoagulation therapy? No    If indicated:   Physician managing anticoagulation treatment:   Where does patient obtain lab work for ATC treatment? Potential Assistance Needed:  Outpatient PT/OT, Home Care    Patient agreeable to home care: No  [place] of choice provided:  n/a    Prior SNF/Rehab Placement and Facility: [facility] rehab  Agreeable to SNF/Rehab: Yes  [place] of choice provided: yes     Evaluation: no    Expected Discharge date:  20    Patient expects to be discharged to:  Home  Follow Up Appointment: Best Day/ Time: Thursday AM    [facility] needed for discharge: No    Readmission Risk              Risk of Unplanned Readmission:        7             Does patient have a readmission risk score greater than 14?: No  If yes, follow-up appointment must be made within 7 days of discharge. Goals of Care:       Discharge Plan: referral made to [facility] rehab.           Electronically signed by Anaya Robles RN on 20 at 3:06 PM EDT

## 2020-07-30 NOTE — DISCHARGE INSTR - COC
Continuity of Care Form    Patient Name: Anju So   :  1935  MRN:  [id number]    Admit date:  2020  Discharge date:  20    Code Status Order: Full Code   Advance Directives:   [facility] Documentation     Date/Time Healthcare Directive Type of Healthcare Directive Copy in [facility] [address] Agent's Name Healthcare Agent's Phone Number    20 2100  No, patient does not have an advance directive for healthcare treatment -- -- -- -- --          Admitting Physician:  Virgen Alfaro MD  PCP: Veronika Christianson MD    Discharging Nurse: [facility] Unit/Room#: 2980/4034-53  Discharging Unit Phone Number: ***    Emergency Contact:   Extended Emergency Contact Information  Primary Emergency Contact: [facility] [facility], [address] Phone: [phone]  Relation: Child  Secondary Emergency Contact: [address] Phone: [phone]  Relation: Child    Past Surgical History:  Past Surgical History:   Procedure Laterality Date    JOINT REPLACEMENT         Immunization History: There is no immunization history on file for this patient.     Active Problems:  Patient Active Problem List   Diagnosis Code    Acute ischemic stroke ([facility] [address] I63.9    SAH (subarachnoid hemorrhage) ([facility]) I60.9       Isolation/Infection:   Isolation          No Isolation        Patient Infection Status     None to display          Nurse Assessment:  Last Vital Signs: BP (!) 141/73   Pulse 82   Temp 98.1 °F (36.7 °C) (Oral)   Resp 21   Ht 5' 6\" (1.676 m)   Wt 162 lb (73.5 kg)   SpO2 96%   BMI 26.15 kg/m²     Last documented pain score (0-10 scale): Pain Level: 5  Last Weight:   Wt Readings from Last 1 Encounters:   20 162 lb (73.5 kg)     Mental Status:  oriented    IV Access:  - None    Nursing Mobility/ADLs:  Walking   Dependent  Transfer  Dependent  Bathing  Dependent  Dressing  Dependent  Toileting  Dependent  Feeding  Dependent  Med Admin  Dependent  Med Delivery whole    Wound Care Documentation and Therapy:        Elimination:  Continence:   · Bowel: {YES / KY:05233}  · Bladder: {YES / MQ:45187}  Urinary Catheter: None   Colostomy/Ileostomy/Ileal Conduit: {YES / LO:72978}       Date of Last BM: ***    Intake/Output Summary (Last 24 hours) at 7/30/2020 1140  Last data filed at 7/30/2020 0630  Gross per 24 hour   Intake 336 ml   Output 1430 ml   Net -1094 ml     I/O last 3 completed shifts: In: 336 [I.V.:336]  Out: 1430 [Urine:1430]    Safety Concerns: At Risk for Falls    Impairments/Disabilities:      None    Nutrition Therapy:  Current Nutrition Therapy:   - Oral Diet:  [address] TONIA Oral WBEJ:[id number]}    Routes of Feeding: Oral  Liquids: {Slp liquid thickness:85206}  Daily Fluid Restriction: yes - amount 1500  Last Modified Barium Swallow with Video (Video Swallowing Test): not done    Treatments at the Time of Hospital Discharge:   Respiratory Treatments: ***  Oxygen Therapy:  is not on home oxygen therapy.   Ventilator:    - No ventilator support    Rehab Therapies: {THERAPEUTIC INTERVENTION:2405031764}  Weight Bearing Status/Restrictions: No weight bearing restirctions  Other Medical Equipment (for information only, NOT a DME order):  {EQUIPMENT:752209380}  Other Treatments: ***    Patient's personal belongings (please select all that are sent with patient):  Glasses, Dentures upper and lower    RN SIGNATURE:  Electronically signed by Pedro Soriano RN on 8/5/20 at 1:20 PM EDT    CASE MANAGEMENT/SOCIAL WORK SECTION    Inpatient Status Date: ***    Readmission Risk Assessment Score:  Readmission Risk              Risk of Unplanned Readmission:        6           [facility] [address]       Phone: [phone]       Fax: [phone]        ·     Dialysis Facility (if applicable)   · Name:  · Address:  · Dialysis Schedule:  · Phone:  · Fax:    Case

## 2020-07-30 NOTE — PROGRESS NOTES
mildly reduced intelligibility (>75%). Pt & pt's daughter report speech is at baseline function. Pt continues to present with +L facial droop, and decreased L labial ROM & coordination. Additionally, pt presents with decreased lingual ROM and coordination. ST to follow up to address noted deficits. Results & recommendations reviewed with pt & pt's daughter who both verbalized understanding. Recommendations:  Requires SLP Intervention: Yes  Duration/Frequency of Treatment: 3-5x per week  D/C Recommendations: Further therapy recommended at discharge. Plan:   Goals:  Short-term Goals  Goal 1: Pt will complete O/M exercise program for facial weakness. Goal 2: Pt will utilize dysarthria compensatory strategies to improve speech intelligibility. Goal 3: Pt will recall 3-5 units with and without distractions with 90% accuracy. Goal 4: Pt will utilize memory compensatory strategies to improve recall. Goal 5: Pt will name 6-8 items in a given category in 4/5 opportunities.    Patient/family involved in developing goals and treatment plan: yes    Subjective:    General  Chart Reviewed: Yes  Family / Caregiver Present: Yes(daughter)     Vision  Vision: Within Functional Limits  Hearing  Hearing: Within functional limits           Objective:     Oral/Motor  Oral Motor: Exceptions to [facility]  Labial ROM: Reduced left  Labial Symmetry: Abnormal symmetry left  Lingual ROM: Reduced right;Reduced left  Lingual Sensation: Reduced  Lingual Coordination: Reduced    Auditory Comprehension  Comprehension: Within Functional Limits         Expression  Primary Mode of Expression: Verbal    Verbal Expression  Verbal Expression: Within functional limits         Motor Speech  Motor Speech: Exceptions to WFL(speech is baseline)  Intelligibility: Mild  Dysarthria : Mild(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

## 2020-07-30 NOTE — CARE COORDINATION
Transitional Planning  Informed that patient and family would like referral to [facility] as patient has been there before. eReferral sent.

## 2020-07-30 NOTE — PLAN OF CARE
Problem: Skin Integrity:  Goal: Will show no infection signs and symptoms  Description: Will show no infection signs and symptoms  Outcome: Ongoing  Goal: Absence of new skin breakdown  Description: Absence of new skin breakdown  Outcome: Ongoing     Problem: Falls - Risk of:  Goal: Will remain free from falls  Description: Will remain free from falls  Outcome: Ongoing  Pt. Bed locked and in lowest position. Room free from clutter. Call light within reach and pt encouraged to call out for any need. Will continue to monitor during hourly rounding.    Goal: Absence of physical injury  Description: Absence of physical injury  Outcome: Ongoing     Problem: Pain:  Goal: Pain level will decrease  Description: Pain level will decrease  Outcome: Ongoing  Goal: Control of acute pain  Description: Control of acute pain  Outcome: Ongoing  Goal: Control of chronic pain  Description: Control of chronic pain  Outcome: Ongoing     Problem: HEMODYNAMIC STATUS  Goal: Patient has stable vital signs and fluid balance  Outcome: Ongoing     Problem: ACTIVITY INTOLERANCE/IMPAIRED MOBILITY  Goal: Mobility/activity is maintained at optimum level for patient  Outcome: Ongoing     Problem: COMMUNICATION IMPAIRMENT  Goal: Ability to express needs and understand communication  Outcome: Ongoing

## 2020-07-30 NOTE — ED NOTES
Report given to [facility] with opp for questions      Joseph Bassett RN  07/29/20 4623

## 2020-07-30 NOTE — CARE COORDINATION
Attempted to notify Quinton Perry at [address] [place], [place] [address]ab. Writer left a VM for her to inform that patient is approved and if precert was started. Waiting on a return phone call.

## 2020-07-30 NOTE — CARE COORDINATION
SBIRT completed    Met with pt and dtr (with pt permission)  Pt with negative alcohol/drug/depressionscreens  Dtr reports he is on an antidepressant prescribed by PCP  Pt denies any increase in depressive symptoms the past 2 weeks            Alcohol Screening and Brief Intervention        No results for input(s): ALC in the last 72 hours. Alcohol Pre-screening  (MEN ONLY) How many times in the past year have you had 5 or more drinks in a day?: None       Alcohol Screening Audit       Drug Pre-Screening   How many times in the past year have you used a recreational drug or used a prescription medication for nonmedical reasons?: None    Drug Screening DAST       Mood Pre-Screening (PHQ-2)  During the past two weeks, have you been bothered by little interest or pleasure in doing things?: No  During the past two weeks, have you been bothered by feeling down, depressed, or hopeless?: No    Mood Pre-Screening (PHQ-9)         I have interviewed Shivam Reed, [id number] regarding  His alcohol consumption/drug use and risk for excessive use. Screenings were negative. Patient  N/A intervention at this time.    Deferred []    Completed on: 7/30/2020   Sathish Dhaliwal, MSW, LSW

## 2020-07-30 NOTE — PROGRESS NOTES
history that includes joint replacement. Restrictions  Restrictions/Precautions  Restrictions/Precautions: Fall Risk, General Precautions  Required Braces or Orthoses?: No  Position Activity Restriction  Other position/activity restrictions: Up w/assist; SBP < 185; per NS note \"CTLS recommendations: clear prior to my exam.\" Pt ok for therapy with no restrictions per Dr. Tracee Abreu with NS    Subjective   General  Patient assessed for rehabilitation services?: Yes  Family / Caregiver Present: Yes(daughter present)  General Comment  Comments: RN ok'd pt for therapy this AM. Pt agreeable to session and pleasant/cooperative throughout  Patient Currently in Pain: Yes  Pain Assessment  Pain Assessment: 0-10  Pain Level: 10  Pain Type: Acute pain  Pain Location: Head  Pain Descriptors: Discomfort  Response to Pain Intervention: Patient Satisfied  Pre Treatment Pain Screening  Intervention List: Patient able to continue with treatment  Vital Signs  Patient Currently in Pain: Yes     Social/Functional History  Social/Functional History  Lives With: Alone  Type of Home: Apartment  Home Layout: One level  Home Access: Stairs to enter with rails  Entrance Stairs - Number of Steps: 5  Entrance Stairs - Rails: Both  Bathroom Shower/Tub: Tub/Shower unit, Shower chair with back  Bathroom Toilet: Standard  Home Equipment: Rolling walker  ADL Assistance: Needs assistance(Pt reports requiring assistance with transfering in/out of the tub and lower body grooming from sons)  Homemaking Assistance: Needs assistance(daughter performs cooking, cleaning and laundry)  Homemaking Responsibilities: No  Ambulation Assistance: Independent(pt reports independently ambulating with RW at baseline)  Transfer Assistance: Needs assistance  Active : No  Mode of Transportation: Family  Occupation: Retired  Leisure & Hobbies: Watching TV  Additional Comments: Pt reports daughter stops over 2x/week and son's intermittently.  Pt receives meals on wheels 5x/week       Objective   Vision: Impaired  Vision Exceptions: Wears glasses for reading  Hearing: Within functional limits         Balance  Sitting Balance: Minimal assistance(Pt progressing from Min-CGA throughout sitting activity)  Standing Balance: Unable to assess(comment)  Functional Mobility  Functional Mobility Comments: CHARAN functional mobility at this time d/t pt fatigue and decreased sitting balance     ADL  Feeding: Minimal assistance;Setup; Increased time to complete;Verbal cueing  Grooming: Minimal assistance;Setup;Verbal cueing; Increased time to complete  UE Bathing: Moderate assistance;Setup;Verbal cueing; Increased time to complete  LE Bathing: Moderate assistance;Setup;Verbal cueing; Increased time to complete  UE Dressing: Moderate assistance;Setup;Verbal cueing; Increased time to complete  LE Dressing: Moderate assistance;Setup;Verbal cueing; Increased time to complete  Toileting: Maximum assistance;Setup; Increased time to complete(Max for rolling for brief change)  Tone RUE  RUE Tone: Normotonic  Tone LUE  LUE Tone: Normotonic  Coordination  Movements Are Fluid And Coordinated: No  Coordination and Movement description: Left UE;Gross motor impairments; Fine motor impairments    Bed mobility  Rolling to Left: Maximum assistance  Rolling to Right: Maximum assistance  Supine to Sit: Maximum assistance  Sit to Supine: Maximum assistance  Scooting: Maximal assistance  Comment: HOB elevated; assist required for BLE and trunk progression  Transfers  Sit to stand: Unable to assess  Stand to sit: Unable to assess    Cognition  Overall Cognitive Status: Exceptions  Following Commands: Follows multistep commands with increased time; Follows multistep commands with repitition  Safety Judgement: Decreased awareness of need for safety;Decreased awareness of need for assistance  Problem Solving: Assistance required to correct errors made  Insights: Decreased awareness of deficits  Initiation: Requires cues for some  Sequencing: Requires cues for some       Sensation  Overall Sensation Status: WFL(pt denies numbness/tingling)        LUE PROM (degrees)  LUE PROM: WFL  LUE AROM (degrees)  LUE AROM : Exceptions  L Shoulder Flexion 0-180: 0-30  L Elbow Flexion 0-145: 0-50  L Elbow Extension 145-0: WFL  Left Hand AROM (degrees)  Left Hand AROM: WFL  RUE AROM (degrees)  RUE AROM : WFL  Right Hand AROM (degrees)  Right Hand AROM: WFL  LUE Strength  Gross LUE Strength: Exceptions to [facility]  L Hand General: 2+/5  LUE Strength Comment: grossly 2-/5  RUE Strength  Gross RUE Strength: WFL  R Hand General: 4/5                   Plan   Plan  Times per week: 3-5 x/wk  Current Treatment Recommendations: Balance Training, Functional Mobility Training, Endurance Training, Strengthening, ROM, Neuromuscular Re-education, Cognitive Reorientation, Pain Management, Safety Education & Training, Equipment Evaluation, Education, & procurement, Self-Care / ADL, Patient/Caregiver Education & Training    AM-PAC Score        AM[facility] Daily Activity Raw Score: 14 (07/30/20 1437)  AM-PAC Inpatient ADL T-Scale Score : 33.39 (07/30/20 1437)  ADL Inpatient CMS 0-100% Score: 59.67 (07/30/20 1437)  ADL Inpatient CMS G-Code Modifier : CK (07/30/20 1437)    Goals  Short term goals  Time Frame for Short term goals: By discharge, pt will:  Short term goal 1: Demo Min A for bed mobility to increase safety and independence with ADLs  Short term goal 2: Demo Min A for UB ADLs and grooming tasks with setup and Min VCs  Short term goal 3: Demo Min A for LB ADLs and toileting tasks with setup and Min VCs  Short term goal 4: Demo 10+ minutes dynamic sitting task to increase independence with ADLs/IADLs  Short term goal 5: Demo AAROM to LUE to increase functional use for ADL/IADL performance  Short term goal 6: NOTIFY OTR TO UPDATE GOALS AS PT PROGRESSES       Therapy Time   Individual Concurrent Group Co-treatment   Time In [address]         Time Out 7651 Minutes 36         Timed Code Treatment Minutes: 8 Minutes       Caleb Marino, OTR/L

## 2020-07-30 NOTE — CONSULTS
recommendations reviewed with pt & pt's daughter who both verbalized understanding. Past Medical History:        Diagnosis Date    BPH (benign prostatic hyperplasia)     Cancer (HCC)     basal cell carcinoma    Cervical spondylolysis     Depression     Hyperlipidemia     Hypertension     Neck pain     Neuropathy     Orofacial dyskinesia     Osteoarthritis     Parkinson's disease ([facility] [address]        Past Surgical History:        Procedure Laterality Date    JOINT REPLACEMENT         Allergies:    No Known Allergies     Current Medications:   Current Facility-Administered Medications: citalopram (CELEXA) tablet 20 mg, 20 mg, Oral, Daily  primidone (MYSOLINE) tablet 50 mg, 50 mg, Oral, BID  amantadine (SYMMETREL) capsule 100 mg, 100 mg, Oral, BID  labetalol (NORMODYNE;TRANDATE) injection syringe 10 mg, 10 mg, Intravenous, Q4H PRN  carbidopa-levodopa (SINEMET CR)  MG per extended release tablet 1 tablet, 1 tablet, Oral, BID  atorvastatin (LIPITOR) tablet 20 mg, 20 mg, Oral, Nightly  sodium chloride flush 0.9 % injection 10 mL, 10 mL, Intravenous, 2 times per day  sodium chloride flush 0.9 % injection 10 mL, 10 mL, Intravenous, PRN  sodium chloride flush 0.9 % injection 10 mL, 10 mL, Intravenous, 2 times per day  sodium chloride flush 0.9 % injection 10 mL, 10 mL, Intravenous, PRN  acetaminophen (TYLENOL) tablet 1,000 mg, 1,000 mg, Oral, 3 times per day  methocarbamol (ROBAXIN) tablet 750 mg, 750 mg, Oral, TID  senna (SENOKOT) tablet 8.6 mg, 1 tablet, Oral, Daily  ondansetron (ZOFRAN) injection 4 mg, 4 mg, Intravenous, Q6H PRN  labetalol (NORMODYNE;TRANDATE) injection 5 mg, 5 mg, Intravenous, Once    Social History:  Unknown home set-up / support - await therapy evaluations.     Social History     Socioeconomic History    Marital status: Single     Spouse name: Not on file    Number of children: Not on file    Years of education: Not on file    Highest education level: Not on file   Occupational History  Not on file   Social Needs    Financial resource strain: Not on file    Food insecurity     Worry: Not on file     Inability: Not on file    Transportation needs     Medical: Not on file     Non-medical: Not on file   Tobacco Use    Smoking status: Former Smoker    Smokeless tobacco: Never Used   Substance and Sexual Activity    Alcohol use: Not on file    Drug use: Not on file    Sexual activity: Not on file   Lifestyle    Physical activity     Days per week: Not on file     Minutes per session: Not on file    Stress: Not on file   Relationships    Social connections     Talks on phone: Not on file     Gets together: Not on file     Attends Latter day service: Not on file     Active member of club or organization: Not on file     Attends meetings of clubs or organizations: Not on file     Relationship status: Not on file    Intimate partner violence     Fear of current or ex partner: Not on file     Emotionally abused: Not on file     Physically abused: Not on file     Forced sexual activity: Not on file   Other Topics Concern    Not on file   Social History Narrative    Not on file       Family History:   No family history on file. Radiology:  MRI Brain 7/29/2020:  Punctate 3 mm acute to subacute infarct in the posterior right frontal lobe   adjacent to the falx.  No other areas of restricted diffusion. There is subarachnoid hemorrhage over the high right parietal lobe and   posterior right frontal lobe. Cerebral atrophy.  Chronic small vessel ischemic changes. CT Thoracic and Lumbar Spine 7/29/2020: Moderate severe multilevel degenerate changes of the thoracic spine and   severe multilevel degenerate changes of the lumbar spine. No convincing evidence acute fracture traumatic malalignment     CT Cervical Spine 7/29/2020: Abnormal C1-C2 relationship which may be related to positioning.  If there is   concern for ligamentous injury, MRI is recommended. No acute fracture. therapy eval regarding family support  5. Rehab Recommendation: Will follow for therapy evaluations. Case management notes referral already made to [facility] as patient has been admitted there before. 6. DVT Prophylaxis: No current medical prophylaxis    It was my pleasure to evaluate the chart of Nida Anderson today. Please call with questions. Elliott Rachel MD        This note is created with the assistance of a speech recognition program.  While intending to generate a document that actually reflects the content of the visit, the document can still have some errors including those of syntax and sound a like substitutions which may escape proof reading. In such instances, actual meaning can be extrapolated by contextual diversion.

## 2020-07-30 NOTE — PROGRESS NOTES
PROGRESS NOTE          PATIENT NAME: [facility] RECORD NO. [id number]  DATE: 2020  SURGEON: Ruben Del Cid   PRIMARY CARE PHYSICIAN: Lisa Zuniga MD    HD: # 1    ASSESSMENT    Patient Active Problem List   Diagnosis    Acute ischemic stroke ([facility] [address]    SAH (subarachnoid hemorrhage) ([facility] [address]       MEDICAL DECISION MAKING AND PLAN    1. Neuro  1. Small subarachnoid hemorrhage  1. Neurosurgery on board  2. No need for repeat head CT, BP controlled on nicardipine- systolics have been in the 140s-150s  2. Pain controlled on tylenol, robaxin  3. Plan to restart home Parkinson medications  2. CV  1. Heart rate 70s-80s, - 150s  3. Resp  1. 96% on room air  4. GI  1. Tolerating General Diet  2. No BM but passing flatus  5. Renal/Electrolytes  1. Mg 1.6, K 3.5, creatinine 0.43, sodium 131  1. 2g Mag IV, one time dose 40 mEq PO potassium  6. Endo  1. Blood sugar 115 this morning  7. Heme  1. hgb 14.8  8. ID  1. WBC 7.5  9. LDA  1. Condom catheter  10. Tertiary  1. Plan to obtain left shoulder, right hand, left foot x-rays will follow up on imaging  11. Dispo  1. Awaiting PT/OT recs  2. PMR consult      Chief Complaint: \" I have a headache\"    Felix Melaraer seen and examined. Patient reports no acute events overnight. He has a condom cath in place, is producing adequate urine. He has not had a bowel movement, but is passing flatus. He is tolerating a general diet tertiary survey has been performed, plan to obtain x-rays of left shoulder, right hand, left foot x-rays. Patient does have a history of Parkinson's disease, will restart on Parkinson's medications today.     OBJECTIVE  VITALS: Temp: Temp: 98.1 °F (36.7 °C)Temp  Av.2 °F (36.8 °C)  Min: 98.1 °F (36.7 °C)  Max: 98.4 °F (84.0 °C) BP Systolic (17NCZ), GWK:531 , Min:141 , MFN:894   Diastolic (34RKP), VAS:73, Min:68, Max:137   Pulse Pulse  Av  Min: 62  Max: 84 Resp Resp  Av.8  Min: 10  Max: 38 Pulse ox SpO2  Av.5 % Min: 89 %  Max: 98 %  GENERAL: alert, no distress  NEURO: CN II-XI grossly intact, patient does have some decreased  strength in the left hand. Sided facial droop still present  HEENT: Normocephalic, atraumatic  LUNGS: clear to ausculation, without wheezes, rales or rhonci  HEART: normal rate and regular rhythm  ABDOMEN: soft, non-tender, non-distended, bowel sounds present in all 4 quadrants and no guarding or peritoneal signs present  EXTREMITY: Patient did have pain to palpation of the left shoulder, right hand, left foot. No edema, cyanosis, deformity noted    I/O last 3 completed shifts: In: 336 [I.V.:336]  Out: 1430 [Urine:1430]    Drain/tube output: In: 336 [I.V.:336]  Out: 1430 [Urine:1430]    LAB:  CBC:   Recent Labs     07/29/20  1348 07/30/20  0644   WBC 5.7 7.5   HGB 12.0* 14.8   HCT 36.6* 42.2   .1 91.9   PLT See Reflexed IPF Result See Reflexed IPF Result     BMP:   Recent Labs     07/29/20  1346 07/29/20  1348 07/30/20  0644   NA  --  135 131*   K  --  4.1 3.5*   CL  --  99 92*   CO2  --  25 24   BUN  --  16 10   CREATININE 0.65 0.51* 0.43*   GLUCOSE  --  81 115*     COAGS:   Recent Labs     07/29/20  1348   APTT 18.7*   INR 1.0       RADIOLOGY:  Will follow-up on left shoulder x-ray, right hand x-ray, left foot x-ray      Edy Hector DO  7/30/20, 10:36 AM         Attending Note    Undergoing cognitive evaluation  I have reviewed the above [facility] note(s) and I either performed the key elements of the medical history and physical exam or was present with the resident when the key elements of the medical history and physical exam were performed. I have discussed the findings, established the care plan and recommendations with Resident, TECSS RN, bedside nurse.     Florentino Gomez MD  7/30/2020  11:09 AM

## 2020-07-30 NOTE — CONSULTS
[facility] Neurology   IN-PATIENT SERVICE      NEUROLOGY CONSULT  NOTE            Date:   7/30/2020  Patient name:  Lizz Gonzalez  Date of admission:  7/29/2020  YOB: 1935      Chief Complaint:     Chief Complaint   Patient presents with    Altered Mental Status     Last known well 1210       Reason for Consult:      Fall, left facial droop, dysarthria, left upper and lower extremity weakness    History of Present Illness:     Case of 78-year-old male with PMH: Parkinson disease, presented with fall and weakness hitting wall, patient developed left-sided facial droop, dysarthria, left upper and lower extremity weakness,    Presented on the 29th by EMS they noticed some left sided symptoms (facial droop, weakness) also dysarthria and was able to be convinced to come to the ED. Stroke team consulted,  Home meds: Atorvastatin 10 mg, Sinemet 25/100 twice daily, primidone: 100 mg twice daily    LK W: 1210-12:30 PM on 7/29/2020  NIHSS: 5: 1 for left arm drift, 1 for left leg drift, 1 for dysarthria, 2 for facial palsy      Past Medical History:     Past Medical History:   Diagnosis Date    BPH (benign prostatic hyperplasia)     Cancer ([facility] [address]     basal cell carcinoma    Cervical spondylolysis     Depression     Hyperlipidemia     Hypertension     Neck pain     Neuropathy     Orofacial dyskinesia     Osteoarthritis     Parkinson's disease ([facility] [address]         Past Surgical History:     Past Surgical History:   Procedure Laterality Date    JOINT REPLACEMENT          Medications Prior to Admission:     Prior to Admission medications    Medication Sig Start Date End Date Taking?  Authorizing Provider   carbidopa-levodopa (SINEMET CR)  MG per extended release tablet Take 1 tablet by mouth 2 times daily   Yes Historical Provider, MD   carbidopa (LODOSYN) 25 MG TABS tablet Take by mouth 3 at 7, 3 at 11, 2 4 pm 2 at 7   Yes Historical Provider, MD   citalopram (CELEXA) 20 MG tablet Take 20 mg by mouth daily   Yes Historical Provider, MD   propranolol (INDERAL LA) 120 MG extended release capsule Take 120 mg by mouth daily   Yes Historical Provider, MD   propranolol (INDERAL LA) 60 MG extended release capsule Take 60 mg by mouth daily   Yes Historical Provider, MD   finasteride (PROSCAR) 5 MG tablet Take 5 mg by mouth daily   Yes Historical Provider, MD   docusate sodium (COLACE) 100 MG capsule Take 100 mg by mouth 2 times daily   Yes Historical Provider, MD   primidone (MYSOLINE) 50 MG tablet Take 100 mg by mouth 2 times daily   Yes Historical Provider, MD   amantadine (SYMMETREL) 100 MG capsule Take 100 mg by mouth 2 times daily   Yes Historical Provider, MD   UNABLE TO FIND Med to replace flomax. Starts with dox? Yes Historical Provider, MD   doxazosin (CARDURA) 2 MG tablet Take 2 mg by mouth nightly   Yes Historical Provider, MD   propranolol (INDERAL) 40 MG tablet Take 40 mg by mouth 2 times daily 60+40 BID   Yes Historical Provider, MD   atorvastatin (LIPITOR) 10 MG tablet Take 10 mg by mouth daily   Yes Historical Provider, MD   ibuprofen (ADVIL;MOTRIN) 600 MG tablet Take 600 mg by mouth 2 times daily   Yes Historical Provider, MD        Allergies:     Patient has no known allergies. Social History:     Tobacco:    reports that he has quit smoking. He has never used smokeless tobacco.  Alcohol:      has no history on file for alcohol. Drug Use:  has no history on file for drug. Family History:     No family history on file.     Review of Systems:       Constitutional Negative for fever and chills   HEENT Negative for ear discharge, ear pain, nosebleed   Eyes Negative for photophobia, pain and discharge   Respiratory Negative for hemoptysis and sputum   Cardiovascular Negative for orthopnea, claudication and PND   Gastrointestinal Negative for abdominal pain, diarrhea, blood in stool   Musculoskeletal Negative for joint pain, negative for myalgia   Skin Negative for rash or itching   hematology Negative for ecchymosis, anemia   Psychiatric Negative for suicidal ideation, anxiety, depression, hallucinations       Physical Exam:   BP (!) 141/73   Pulse 82   Temp 98.1 °F (36.7 °C) (Oral)   Resp 21   Ht 5' 6\" (1.676 m)   Wt 162 lb (73.5 kg)   SpO2 96%   BMI 26.15 kg/m²   Temp (24hrs), Av.2 °F (36.8 °C), Min:98.1 °F (36.7 °C), Max:98.4 °F (36.9 °C)        General examination:      General Appearance:  alert, well appearing, and in no acute distress  HEENT: Normocephalic, atraumatic, moist mucus membranes  Neck: supple, no carotid bruits, (-) nuchal rigidity  Lungs:  Respirations unlabored, chest wall no deformity, BS normal  Cardiovascular: normal rate, regular rhythm  Abdomen: Soft, nontender, nondistended, normal bowel sounds  Skin: No gross lesions, rashes, bruising or bleeding on exposed skin area  Extremities:  peripheral pulses palpable, no cyanosis, clubbing or edema  Psych: normal affect      Neurological examination:      Mental status   Alert and oriented x 3; following all commands;   speech is fluent, no dysarthria, aphasia. Cranial nerves   II - visual fields intact to confrontation; pupils reactive  III, IV, VI - extraocular muscles intact; no JOSE ALEJANDRO; no nystagmus; no ptosis   V - normal facial sensation                                                               VII - normal facial symmetry                                                             VIII - intact hearing                                                                             IX, X - symmetrical palate elevation                                               XI - symmetrical shoulder shrug                                                       XII - midline tongue without atrophy or fasciculation     Motor function  Strength:   5/5 RUE, 5/5 RLE  4/5 LUE, 4/5  LLE  Normal bulk and tone. Sensory function Intact to touch, pin, vibration, proprioception throughout     Cerebellar Intact finger-nose-finger testing. Intact heel-shin testing. No dysdiadochokinesia present. No tremors                        Reflex function 2/4 symmetric throughout . Downgoing plantar response bilaterally. (-)Canchola's sign bilaterally      Gait                  Normal station and gait           Diagnostics:      Laboratory Testing:  CBC:   Recent Labs     07/29/20  1348 07/30/20  0644   WBC 5.7 7.5   HGB 12.0* 14.8   PLT See Reflexed IPF Result See Reflexed IPF Result     BMP:    Recent Labs     07/29/20  1346 07/29/20  1348 07/30/20  0644   NA  --  135 131*   K  --  4.1 3.5*   CL  --  99 92*   CO2  --  25 24   BUN  --  16 10   CREATININE 0.65 0.51* 0.43*   GLUCOSE  --  81 115*         Lab Results   Component Value Date    TSH 1.44 07/29/2020    INR 1.0 07/29/2020    LABA1C 5.4 07/29/2020       No results found for: PHENYTOIN, PHENYTOIN, VALPROATE, CBMZ      Imaging/Diagnostics:  Ct Head Wo Contrast    Result Date: 7/29/2020  EXAMINATION: CT OF THE HEAD WITHOUT CONTRAST,  7/29/2020 1:40 pm TECHNIQUE: CT of the head was performed without the administration of intravenous contrast. Dose modulation, iterative reconstruction, and/or weight based adjustment of the mA/kV was utilized to reduce the radiation dose to as low as reasonably achievable. COMPARISON: None HISTORY: ORDERING SYSTEM PROVIDED HISTORY: Stroke, L sided weakness. LKW 1210 pm TECHNOLOGIST PROVIDED HISTORY: Stroke, L sided weakness. LKW 1210 pm Reason for Exam: Stroke, L sided weakness. LKW 1210 pm Acuity: Unknown Type of Exam: Unknown Initial evaluation FINDINGS: BRAIN/VENTRICLES: There is mild cerebral atrophy. There is atherosclerotic calcification of the cavernous carotid arteries. There are areas of hypoattenuation in the periventricular white matter and centrum semiovale that are likely related to chronic small vessel ischemic disease. There is a focal area of hypoattenuation in the left frontal lobe that likely represents an infarct of indeterminate age.  There is no midline shift or mass effect. There is a subtle questionable area of hyperdensity in one of the sulci over the high right parietal region. While this may be artifact, very subtle subarachnoid hemorrhage cannot be excluded. ORBITS: The visualized portion of the orbits demonstrate no acute abnormality. SINUSES:  The visualized paranasal sinuses and mastoid air cells are clear. SOFT TISSUES/SKULL:  No acute abnormality of the visualized skull or soft tissues. Cerebral atrophy. Chronic small vessel ischemic changes. Area of hypoattenuation in the left frontal periventricular white matter which likely represents an infarct of indeterminate age. Questionable subtle hyperattenuation over the right frontal convexity which is suspicious for a tiny subarachnoid hemorrhage. Report was called and discussed with Dr. Adryan Mcleod, 07/29/2020 at 2:16 p.m. Ct Cervical Spine Wo Contrast    Result Date: 7/29/2020  EXAMINATION: CT OF THE CERVICAL SPINE WITHOUT CONTRAST 7/29/2020 7:05 pm TECHNIQUE: CT of the cervical spine was performed without the administration of intravenous contrast. Multiplanar reformatted images are provided for review. Dose modulation, iterative reconstruction, and/or weight based adjustment of the mA/kV was utilized to reduce the radiation dose to as low as reasonably achievable. COMPARISON: None HISTORY: ORDERING SYSTEM PROVIDED HISTORY: trauma TECHNOLOGIST PROVIDED HISTORY: trauma Reason for Exam: fall Acuity: Acute Type of Exam: Initial FINDINGS: No acute fracture. No subluxation. No prevertebral soft tissue swelling. Severe multilevel degenerative changes. Abnormal C1-C2 positioning with widening on the left. Abnormal C1-C2 relationship which may be related to positioning. If there is concern for ligamentous injury, MRI is recommended. No acute fracture. Severe multilevel degenerative changes.      Ct Thoracic Spine Wo Contrast    Result Date: 7/29/2020  EXAMINATION: CT OF THE THORACIC SPINE WITHOUT CONTRAST; CT OF THE LUMBAR SPINE WITHOUT CONTRAST 7/29/2020 TECHNIQUE: CT of the thoracic spine was performed without the administration of intravenous contrast. Multiplanar reformatted images are provided for review. Dose modulation, iterative reconstruction, and/or weight based adjustment of the mA/kV was utilized to reduce the radiation dose to as low as reasonably achievable.; CT of the lumbar spine was performed without the administration of intravenous contrast. Multiplanar reformatted images are provided for review. Dose modulation, iterative reconstruction, and/or weight based adjustment of the mA/kV was utilized to reduce the radiation dose to as low as reasonably achievable. COMPARISON: CT chest abdomen pelvis 07/29/2020 HISTORY: ORDERING SYSTEM PROVIDED HISTORY: trauma TECHNOLOGIST PROVIDED HISTORY: trauma Reason for Exam: fall Acuity: Acute Type of Exam: Initial; ORDERING SYSTEM PROVIDED HISTORY: TRAUMA TECHNOLOGIST PROVIDED HISTORY: trauma Reason for Exam: fall Acuity: Acute Type of Exam: Initial FINDINGS: BONES/ALIGNMENT: There is no acute fracture or traumatic malalignment. Central plate depression T6-J1 appears chronic. DEGENERATIVE CHANGES: Severe multilevel degenerate changes. Multilevel ankylosis identified. Slight widening of the disc spaces identified at T5-T6 and T12-L1 likely degenerative severe multilevel disc space disease involving the lumbar spine with multilevel vacuum disc phenomena. SOFT TISSUES: No paraspinal mass is seen. Moderate severe multilevel degenerate changes of the thoracic spine and severe multilevel degenerate changes of the lumbar spine.  No convincing evidence acute fracture traumatic malalignment     Ct Lumbar Spine Wo Contrast    Result Date: 7/29/2020  EXAMINATION: CT OF THE THORACIC SPINE WITHOUT CONTRAST; CT OF THE LUMBAR SPINE WITHOUT CONTRAST 7/29/2020 TECHNIQUE: CT of the thoracic spine was performed without the administration of intravenous contrast. HEAD AND NECK WITH CONTRAST 7/29/2020 1:40 pm: TECHNIQUE: CTA of the head and neck was performed with the administration of intravenous contrast. Multiplanar reformatted images are provided for review. MIP images are provided for review. Stenosis of the internal carotid arteries measured using NASCET criteria. Dose modulation, iterative reconstruction, and/or weight based adjustment of the mA/kV was utilized to reduce the radiation dose to as low as reasonably achievable. COMPARISON: None. HISTORY: ORDERING SYSTEM PROVIDED HISTORY: stroke symptoms, L sided weakness, LKW 1210pm TECHNOLOGIST PROVIDED HISTORY: stroke symptoms, L sided weakness, LKW 1210pm Reason for Exam: stroke, L sided weakness. LKW 1210pm Acuity: Unknown Type of Exam: Unknown Initial evaluation. FINDINGS: CTA NECK: AORTIC ARCH/ARCH VESSELS: No significant abnormality of the aortic arch is identified. Incidental note is made of the left vertebral artery originating directly from the aortic arch which is a normal anatomic variant. CAROTID ARTERIES: The common carotid arteries are patent bilaterally. There is minimal atherosclerotic disease in the mid common carotid arteries bilaterally without any significant narrowing. There is atherosclerotic calcification and approximately 50-60% narrowing of the left internal carotid artery just distal to the bulb. There is atherosclerotic disease in the proximal bulb with approximately 30% narrowing of the vessel. The left internal carotid artery is then patent through the skull base. There is atherosclerotic calcification in the region of the right internal carotid artery bulb with approximately 20% stenosis of the vessel. The right internal carotid artery is then patent through the skull base. VERTEBRAL ARTERIES: The right vertebral artery is dominant and patent in its visualized course. The left vertebral artery originates directly from the aortic arch and is patent in its visualized course.  SOFT TISSUES: There is no acute abnormality of the visualized soft tissues. BONES: There are mild degenerative changes of the cervical spine. CTA HEAD: ANTERIOR CIRCULATION:  No abnormality of the internal carotid arteries, middle cerebral arteries, or anterior cerebral arteries are identified. POSTERIOR CIRCULATION:  No abnormality of the distal vertebral arteries, basilar artery, or posterior cerebral arteries are identified. Incidental note is made of fetal origin of the right posterior cerebral artery which is a normal anatomic variant. No obvious aneurysms are identified. OTHER: No dural venous sinus thrombosis on this non-dedicated study. Approximately 30% stenosis of the left internal carotid artery in the proximal left carotid bulb and approximately 50-60% stenosis of the left internal carotid artery just distal to the bulb. Atherosclerotic calcification of the right internal carotid artery with approximately 20% stenosis in the region of the bulb. The left vertebral artery originates directly from the aortic arch which is a normal anatomic variant. No significant abnormality of the intracranial circulation. Incidental note is made of fetal origin of the right posterior cerebral artery which is a normal anatomic variant. Ct Chest Abdomen Pelvis W Contrast    Result Date: 7/30/2020  EXAMINATION: CT OF THE CHEST, ABDOMEN, AND PELVIS WITH CONTRAST 7/29/2020 7:05 pm TECHNIQUE: CT of the chest, abdomen and pelvis was performed with the administration of intravenous contrast. Multiplanar reformatted images are provided for review. Dose modulation, iterative reconstruction, and/or weight based adjustment of the mA/kV was utilized to reduce the radiation dose to as low as reasonably achievable. COMPARISON: None. HISTORY: ORDERING SYSTEM PROVIDED HISTORY: trauma TECHNOLOGIST PROVIDED HISTORY: trauma Reason for Exam: fall Acuity: Acute Type of Exam: Initial Patient fell hitting head when opening front door. FINDINGS: Chest: Mediastinum: No mediastinal adenopathy or acute aortic abnormality. Calcification aortic arch is noted. Mild cardiomegaly. Coronary artery calcification. No pericardial effusion. Lungs/pleura: Mild emphysematous changes. No effusion, focal consolidation, or extrapleural air. Tracheobronchial tree is patent. Left upper lobe granuloma. Soft Tissues/Bones: Non-acute appearing fracture C7 spinous process versus soft tissue ligamentous calcification. No acute appearing osseous abnormality. No axillary adenopathy or acute soft tissue abnormality. Abdomen/Pelvis: Organs: Liver, gallbladder, pancreas, spleen, and adrenals are unremarkable. Kidneys contain multiple bilateral cysts largest in the right kidney of 5 cm and in the left kidney of 3.5 cm. No urinary obstruction. GI/Bowel: No free fluid, free air, bowel obstruction or bowel wall thickening. Increased colonic stool load. Pelvis: Bladder is intact. No abnormal fluid collections, pelvic or inguinal adenopathy. Bilateral fat containing inguinal hernias are noted without strangulation. Prostate is mildly enlarged. Peritoneum/Retroperitoneum:   No acute aortic abnormality; no aneurysm. No retroperitoneal or mesenteric adenopathy. Bones/Soft Tissues: Degenerative changes are present in the hips and lower lumbar facets. Multilevel degenerative and degenerative disc changes are noted. CT chest: Emphysematous changes. No extrapleural air, effusion or acute pulmonary findings. Atherosclerotic disease. CT abdomen and pelvis: No solid organ injury. No acute abdominopelvic process. Osseous findings as above. Mri Limited Brain    Result Date: 7/29/2020  EXAMINATION: MRI OF THE BRAIN WITHOUT CONTRAST  7/29/2020 3:00 pm TECHNIQUE: Multiplanar multisequence MRI of the brain was performed without the administration of intravenous contrast. COMPARISON: CT head 01/29/2020 HISTORY: ORDERING SYSTEM PROVIDED HISTORY: add GRE.  [facility] for stroke TECHNOLOGIST PROVIDED HISTORY: add GRE. [facility] for stroke Initial evaluation FINDINGS: INTRACRANIAL STRUCTURES/VENTRICLES: There is a punctate area of restricted diffusion measuring 3 mm medially in the posterior left frontal lobe along the falx that is suggestive of an acute infarct or subacute infarct. There is high signal in some of the sulci over the high right parietal lobe with corresponding low signal on the gradient echo images suggestive of subarachnoid hemorrhage. There is subarachnoid hemorrhage in the pre frontal sulcus. There is diffuse cerebral atrophy. There are chronic small vessel ischemic changes. There are several punctate areas of low signal scattered throughout the brain on the gradient echo images suggestive of previous areas of microhemorrhage. The findings were discussed with Dr. Sascha Parra, 07/29/2020 at 3:18 p.m. Punctate 3 mm acute to subacute infarct in the posterior right frontal lobe adjacent to the falx. No other areas of restricted diffusion. There is subarachnoid hemorrhage over the high right parietal lobe and posterior right frontal lobe. Cerebral atrophy. Chronic small vessel ischemic changes. Impression:      Case of 81-year-old male with PMH: Parkinson disease, presented with fall and weakness hitting wall, patient developed left-sided facial droop, dysarthria, left upper and lower extremity weakness,    Presented on the 29th by EMS they noticed some left sided symptoms (facial droop, weakness) also dysarthria and was able to be convinced to come to the ED.     Stroke team consulted,  Home meds: Atorvastatin 10 mg, Sinemet 25/100 twice daily, primidone: 100 mg twice daily    LK W: 1210-12:30 PM on 7/29/2020  NIHSS: 5        Plan:      -CTh W0: Some hyperintensity on the high parietal lobe   -CTA H/N:  -MRI brain W0: Punctuate 3 mm acute to subacute infarct in the posterior right frontal lobe adjacent to the falx, there is subarachnoid hemorrhage over the high right parietal lobe and posterior right frontal lobe, cerebral atrophy  -No TPA due to traumatic subarachnoid hemorrhage  -Trauma surgery and neurosurgery consulted: No neurosurgery intervention needed,  -ILDEFONSO with bubble study:  -Folic acid 1 mg twice daily  -continue atorvastatin 10 mg nightly  -Hold all antiplatelets and anticoagulants  -Follow-up CT cervical, thoracic and lumbar spine  -lipid profile  -A1c  -PT/OT/speech  -hydration  -neurochecks per protocol  -SBP<185  -Blood sugar:<180  -Please avoid dextrose containing solutions            Electronically signed by Chau Dominguez MD on 7/30/2020 at 9:30 AM      [facility]  Neurology

## 2020-07-30 NOTE — PROGRESS NOTES
Physical Therapy    Facility/Department: [facility] 4A STEPDOWN  Initial Assessment    NAME: Kennedy Russell  : 1935  MRN: [id number]    Date of Service: 2020  Chief Complaint   Patient presents with    Altered Mental Status     Last known well 1210       Discharge Recommendations:    Further therapy recommended at discharge. PT Equipment Recommendations  Equipment Needed: (Continue to assess pending progression of mobility)    Assessment   Body structures, Functions, Activity limitations: Decreased functional mobility ; Decreased balance;Decreased ROM; Decreased endurance; Increased pain;Decreased strength;Decreased sensation  Assessment: Pt required maxA to perform bed mobility, Bret to sit EOB once established. Pt demonstrates LUE and LLE strength deficits and fatigue limiting tolerance to functional mobility this date. Pt would benefit from continued skilled physical therapy to address these deficits and maximize independence. Prognosis: Good  Decision Making: Medium Complexity  PT Education: Goals;PT Role;Plan of Care;General Safety  REQUIRES PT FOLLOW UP: Yes  Activity Tolerance  Activity Tolerance: Patient limited by endurance; Patient limited by fatigue       Patient Diagnosis(es): The encounter diagnosis was Cerebrovascular accident (CVA), unspecified mechanism ([address] has a past medical history of BPH (benign prostatic hyperplasia), Cancer ([address] Cervical spondylolysis, Depression, Hyperlipidemia, Hypertension, Neck pain, Neuropathy, Orofacial dyskinesia, Osteoarthritis, and Parkinson's disease ([address] has a past surgical history that includes joint replacement.     Restrictions  Restrictions/Precautions  Restrictions/Precautions: Fall Risk, General Precautions  Required Braces or Orthoses?: No  Position Activity Restriction  Other position/activity restrictions: Up w/assist; SBP < 185; per NS note \"CTLS recommendations: clear prior to my exam.\" Pt ok for therapy with no restrictions per Dr. Bishnu Brothers with NS  Vision/Hearing  Vision: Impaired  Vision Exceptions: Wears glasses for reading  Hearing: Within functional limits     Subjective  General  Patient assessed for rehabilitation services?: Yes  Response To Previous Treatment: Not applicable  Family / Caregiver Present: No  Follows Commands: Within Functional Limits  Subjective  Subjective: RN and pt in agreement for PT eval; pt supine in bed upon PT arrival, pt pleasant and cooperative throughout  Pain Screening  Patient Currently in Pain: Yes  Pain Assessment  Pain Assessment: 0-10  Pain Level: 10  Pain Type: Acute pain  Pain Location: Head  Pain Descriptors: Discomfort  Response to Pain Intervention: Patient Satisfied  Multiple Pain Sites: No  Vital Signs  Patient Currently in Pain: Yes       Orientation  Orientation  Overall Orientation Status: Within Functional Limits  Social/Functional History  Social/Functional History  Lives With: Alone  Type of Home: Apartment  Home Layout: One level  Home Access: Stairs to enter with rails  Entrance Stairs - Number of Steps: 5  Entrance Stairs - Rails: Both  Bathroom Shower/Tub: Tub/Shower unit, Shower chair with back  Bathroom Toilet: Standard  Home Equipment: Rolling walker  ADL Assistance: Needs assistance(Pt reports requiring assistance with transfering in/out of the tub and lower body grooming from sons)  Homemaking Assistance: Needs assistance(daughter performs cooking, cleaning and laundry)  Homemaking Responsibilities: No  Ambulation Assistance: Independent(pt reports independently ambulating with RW at baseline)  Transfer Assistance: Needs assistance  Active : No  Mode of Transportation: Family  Occupation: Retired  Leisure & Hobbies: Watching TV  Additional Comments: Pt reports daughter stops over 2x/week and son's intermittently. Pt receives meals on wheels 5x/week  Cognition   Cognition  Overall Cognitive Status: Exceptions  Following Commands: Follows multistep commands with increased time; Follows multistep commands with repitition  Safety Judgement: Decreased awareness of need for safety;Decreased awareness of need for assistance  Problem Solving: Assistance required to correct errors made  Insights: Decreased awareness of deficits  Initiation: Requires cues for some  Sequencing: Requires cues for some    Objective  Joint Mobility  Spine: WFL  ROM RLE: Hip and ankle WFL; Knee lacking ~10 degrees of extension  ROM LLE: Hip and ankle WFL; Knee lacking ~10 degrees of extension  ROM RUE: See OT assessment- PT/ OT coeval  ROM LUE: See OT assessment- PT/ OT coeval  Strength RLE  Strength RLE: Exception  Comment: Grossly 4/5 at hip, knee, and ankle  Strength LLE  Strength LLE: Exception  Comment: Grossly 3+/5 at hip, knee grossly 3+/5; ankle DF 4/5  Strength RUE  Comment: See OT assessment- PT/ OT coeval  Strength LUE  Comment: See OT assessment- PT/ OT coeval  Motor Control  Gross Motor?: WFL  Sensation  Overall Sensation Status: WFL(pt denies numbness/tingling)  Bed mobility  Supine to Sit: Maximum assistance  Sit to Supine: Maximum assistance  Comment: HOB elevated with use of bed rails. Pt able to sit EOB Bret for ~5 minutes once established. RN to perform care on IV while seated EOB. Pt required min-modA to correct R trunk lean, fatiguing quickly. Pt requested return to supine positon due to fatigue and RN securing IV. Transfers  Comment: Unable to attempt due to pt fatigue this date. Ambulation  Ambulation?: No  More Ambulation?: No  Stairs/Curb  Stairs?: No     Balance  Posture: Fair(Forward head, rounded shoulders)  Sitting - Static: Fair;+  Sitting - Dynamic: Fair;-  Comments: Unable to attempt standing balance this date due to pt fatigue and reported headache.         Plan   Plan  Times per week: 5-6x/week  Current Treatment Recommendations: Strengthening, Transfer Training, Endurance Training, Patient/Caregiver Education & Training, ROM, Balance Training, Gait Training, Home Exercise Program, Functional Mobility Training, Stair training, Safety Education & Training  Safety Devices  Type of devices: Patient at risk for falls, Left in bed, Call light within reach, Nurse notified, Bed alarm in place  Restraints  Initially in place: No  AM-PAC Score     AM-PAC Inpatient Mobility without Stair Climbing Raw Score : 10 (07/30/20 1439)  AM-PAC Inpatient without Stair Climbing T-Scale Score : 34.07 (07/30/20 1439)  Mobility Inpatient CMS 0-100% Score: 71.66 (07/30/20 1439)  Mobility Inpatient without Stair CMS G-Code Modifier : CL (07/30/20 1439)       Goals  Short term goals  Time Frame for Short term goals: 14  Short term goal 1: Pt to perform bed mobility Bret  Short term goal 2: Demonstrate sit to stand transfer Bret  Short term goal 3: Ambulate 100ft w/ RW Bret  Short term goal 4: Tolerate 30 minutes of therapy to demo increased endurance  Patient Goals   Patient goals :  To go home       Therapy Time   Individual Concurrent Group Co-treatment   Time In 0841         Time Out 0917         Minutes 36         Timed Code Treatment Minutes: [address] [facility], PT

## 2020-07-31 LAB
ABSOLUTE EOS #: 0.07 K/UL (ref 0–0.44)
ABSOLUTE IMMATURE GRANULOCYTE: 0.03 K/UL (ref 0–0.3)
ABSOLUTE LYMPH #: 0.97 K/UL (ref 1.1–3.7)
ABSOLUTE MONO #: 1.03 K/UL (ref 0.1–1.2)
ANION GAP SERPL CALCULATED.3IONS-SCNC: 14 MMOL/L (ref 9–17)
BASOPHILS # BLD: 1 % (ref 0–2)
BASOPHILS ABSOLUTE: 0.04 K/UL (ref 0–0.2)
BUN BLDV-MCNC: 12 MG/DL (ref 8–23)
BUN/CREAT BLD: ABNORMAL (ref 9–20)
CALCIUM SERPL-MCNC: 8.7 MG/DL (ref 8.6–10.4)
CHLORIDE BLD-SCNC: 93 MMOL/L (ref 98–107)
CO2: 22 MMOL/L (ref 20–31)
CREAT SERPL-MCNC: 0.49 MG/DL (ref 0.7–1.2)
DIFFERENTIAL TYPE: ABNORMAL
EOSINOPHILS RELATIVE PERCENT: 1 % (ref 1–4)
GFR AFRICAN AMERICAN: >60 ML/MIN
GFR NON-AFRICAN AMERICAN: >60 ML/MIN
GFR SERPL CREATININE-BSD FRML MDRD: ABNORMAL ML/MIN/{1.73_M2}
GFR SERPL CREATININE-BSD FRML MDRD: ABNORMAL ML/MIN/{1.73_M2}
GLUCOSE BLD-MCNC: 99 MG/DL (ref 70–99)
HCT VFR BLD CALC: 43 % (ref 40.7–50.3)
HEMOGLOBIN: 14 G/DL (ref 13–17)
IMMATURE GRANULOCYTES: 0 %
LYMPHOCYTES # BLD: 11 % (ref 24–43)
MCH RBC QN AUTO: 31.5 PG (ref 25.2–33.5)
MCHC RBC AUTO-ENTMCNC: 32.6 G/DL (ref 28.4–34.8)
MCV RBC AUTO: 96.6 FL (ref 82.6–102.9)
MONOCYTES # BLD: 12 % (ref 3–12)
NRBC AUTOMATED: 0 PER 100 WBC
PDW BLD-RTO: 13.4 % (ref 11.8–14.4)
PLATELET # BLD: 168 K/UL (ref 138–453)
PLATELET ESTIMATE: ABNORMAL
PMV BLD AUTO: 11.7 FL (ref 8.1–13.5)
POTASSIUM SERPL-SCNC: 3.6 MMOL/L (ref 3.7–5.3)
RBC # BLD: 4.45 M/UL (ref 4.21–5.77)
RBC # BLD: ABNORMAL 10*6/UL
SEG NEUTROPHILS: 75 % (ref 36–65)
SEGMENTED NEUTROPHILS ABSOLUTE COUNT: 6.53 K/UL (ref 1.5–8.1)
SODIUM BLD-SCNC: 129 MMOL/L (ref 135–144)
WBC # BLD: 8.7 K/UL (ref 3.5–11.3)
WBC # BLD: ABNORMAL 10*3/UL

## 2020-07-31 PROCEDURE — 6370000000 HC RX 637 (ALT 250 FOR IP): Performed by: STUDENT IN AN ORGANIZED HEALTH CARE EDUCATION/TRAINING PROGRAM

## 2020-07-31 PROCEDURE — 97110 THERAPEUTIC EXERCISES: CPT

## 2020-07-31 PROCEDURE — 6370000000 HC RX 637 (ALT 250 FOR IP): Performed by: NURSE PRACTITIONER

## 2020-07-31 PROCEDURE — 36415 COLL VENOUS BLD VENIPUNCTURE: CPT

## 2020-07-31 PROCEDURE — 2060000000 HC ICU INTERMEDIATE R&B

## 2020-07-31 PROCEDURE — 2500000003 HC RX 250 WO HCPCS: Performed by: STUDENT IN AN ORGANIZED HEALTH CARE EDUCATION/TRAINING PROGRAM

## 2020-07-31 PROCEDURE — 99233 SBSQ HOSP IP/OBS HIGH 50: CPT | Performed by: PSYCHIATRY & NEUROLOGY

## 2020-07-31 PROCEDURE — 2580000003 HC RX 258: Performed by: STUDENT IN AN ORGANIZED HEALTH CARE EDUCATION/TRAINING PROGRAM

## 2020-07-31 PROCEDURE — APPSS15 APP SPLIT SHARED TIME 0-15 MINUTES: Performed by: NURSE PRACTITIONER

## 2020-07-31 PROCEDURE — 92507 TX SP LANG VOICE COMM INDIV: CPT

## 2020-07-31 PROCEDURE — 97530 THERAPEUTIC ACTIVITIES: CPT

## 2020-07-31 PROCEDURE — 80048 BASIC METABOLIC PNL TOTAL CA: CPT

## 2020-07-31 PROCEDURE — 85025 COMPLETE CBC W/AUTO DIFF WBC: CPT

## 2020-07-31 PROCEDURE — 6370000000 HC RX 637 (ALT 250 FOR IP): Performed by: PSYCHIATRY & NEUROLOGY

## 2020-07-31 PROCEDURE — 99232 SBSQ HOSP IP/OBS MODERATE 35: CPT | Performed by: NEUROLOGICAL SURGERY

## 2020-07-31 PROCEDURE — 97129 THER IVNTJ 1ST 15 MIN: CPT

## 2020-07-31 PROCEDURE — 2580000003 HC RX 258: Performed by: INTERNAL MEDICINE

## 2020-07-31 RX ORDER — PROPRANOLOL HCL 60 MG
60 CAPSULE, EXTENDED RELEASE 24HR ORAL 2 TIMES DAILY
Status: DISCONTINUED | OUTPATIENT
Start: 2020-07-31 | End: 2020-08-06 | Stop reason: HOSPADM

## 2020-07-31 RX ORDER — CARBIDOPA AND LEVODOPA 25; 100 MG/1; MG/1
1 TABLET, EXTENDED RELEASE ORAL 4 TIMES DAILY
Status: DISCONTINUED | OUTPATIENT
Start: 2020-07-31 | End: 2020-08-06 | Stop reason: HOSPADM

## 2020-07-31 RX ORDER — DOCUSATE SODIUM 100 MG/1
100 CAPSULE, LIQUID FILLED ORAL DAILY
Status: DISCONTINUED | OUTPATIENT
Start: 2020-07-31 | End: 2020-08-06 | Stop reason: HOSPADM

## 2020-07-31 RX ORDER — IBUPROFEN 800 MG/1
800 TABLET ORAL EVERY 8 HOURS PRN
Status: DISCONTINUED | OUTPATIENT
Start: 2020-07-31 | End: 2020-08-02

## 2020-07-31 RX ORDER — PROPRANOLOL HYDROCHLORIDE 40 MG/1
40 TABLET ORAL 2 TIMES DAILY
Status: DISCONTINUED | OUTPATIENT
Start: 2020-07-31 | End: 2020-08-04

## 2020-07-31 RX ORDER — SODIUM CHLORIDE 1000 MG
1 TABLET, SOLUBLE MISCELLANEOUS 2 TIMES DAILY WITH MEALS
Status: DISCONTINUED | OUTPATIENT
Start: 2020-07-31 | End: 2020-08-01

## 2020-07-31 RX ORDER — BUTALBITAL, ACETAMINOPHEN AND CAFFEINE 50; 325; 40 MG/1; MG/1; MG/1
1 TABLET ORAL 3 TIMES DAILY PRN
Status: DISCONTINUED | OUTPATIENT
Start: 2020-07-31 | End: 2020-08-06 | Stop reason: HOSPADM

## 2020-07-31 RX ORDER — POLYETHYLENE GLYCOL 3350 17 G/17G
17 POWDER, FOR SOLUTION ORAL DAILY
Status: DISCONTINUED | OUTPATIENT
Start: 2020-07-31 | End: 2020-08-06 | Stop reason: HOSPADM

## 2020-07-31 RX ORDER — POTASSIUM CHLORIDE 20 MEQ/1
40 TABLET, EXTENDED RELEASE ORAL ONCE
Status: COMPLETED | OUTPATIENT
Start: 2020-07-31 | End: 2020-07-31

## 2020-07-31 RX ORDER — DOXAZOSIN 2 MG/1
2 TABLET ORAL NIGHTLY
Status: DISCONTINUED | OUTPATIENT
Start: 2020-07-31 | End: 2020-08-06 | Stop reason: HOSPADM

## 2020-07-31 RX ORDER — FINASTERIDE 5 MG/1
5 TABLET, FILM COATED ORAL DAILY
Status: DISCONTINUED | OUTPATIENT
Start: 2020-07-31 | End: 2020-08-06 | Stop reason: HOSPADM

## 2020-07-31 RX ADMIN — ACETAMINOPHEN 1000 MG: 500 TABLET ORAL at 13:42

## 2020-07-31 RX ADMIN — AMANTADINE HYDROCHLORIDE 100 MG: 100 CAPSULE ORAL at 09:07

## 2020-07-31 RX ADMIN — POLYETHYLENE GLYCOL 3350 17 G: 17 POWDER, FOR SOLUTION ORAL at 13:52

## 2020-07-31 RX ADMIN — METHOCARBAMOL TABLETS 750 MG: 750 TABLET, COATED ORAL at 13:42

## 2020-07-31 RX ADMIN — ATORVASTATIN CALCIUM 20 MG: 20 TABLET, FILM COATED ORAL at 22:03

## 2020-07-31 RX ADMIN — CARBIDOPA AND LEVODOPA 1 TABLET: 25; 100 TABLET, EXTENDED RELEASE ORAL at 18:19

## 2020-07-31 RX ADMIN — SODIUM CHLORIDE, PRESERVATIVE FREE 10 ML: 5 INJECTION INTRAVENOUS at 09:07

## 2020-07-31 RX ADMIN — METHOCARBAMOL TABLETS 750 MG: 750 TABLET, COATED ORAL at 09:06

## 2020-07-31 RX ADMIN — IBUPROFEN 800 MG: 800 TABLET, FILM COATED ORAL at 19:01

## 2020-07-31 RX ADMIN — ACETAMINOPHEN 1000 MG: 500 TABLET ORAL at 05:37

## 2020-07-31 RX ADMIN — SENNOSIDES 8.6 MG: 8.6 TABLET, FILM COATED ORAL at 09:06

## 2020-07-31 RX ADMIN — PROPRANOLOL HYDROCHLORIDE 60 MG: 60 CAPSULE, EXTENDED RELEASE ORAL at 13:52

## 2020-07-31 RX ADMIN — METHOCARBAMOL TABLETS 750 MG: 750 TABLET, COATED ORAL at 22:04

## 2020-07-31 RX ADMIN — FINASTERIDE 5 MG: 5 TABLET, FILM COATED ORAL at 13:52

## 2020-07-31 RX ADMIN — CARBIDOPA AND LEVODOPA 1 TABLET: 25; 100 TABLET, EXTENDED RELEASE ORAL at 09:07

## 2020-07-31 RX ADMIN — Medication 10 MG: at 04:05

## 2020-07-31 RX ADMIN — SODIUM CHLORIDE TAB 1 GM 1 G: 1 TAB at 17:16

## 2020-07-31 RX ADMIN — PRIMIDONE 50 MG: 50 TABLET ORAL at 09:06

## 2020-07-31 RX ADMIN — DOCUSATE SODIUM 100 MG: 100 CAPSULE, LIQUID FILLED ORAL at 13:52

## 2020-07-31 RX ADMIN — ACETAMINOPHEN 1000 MG: 500 TABLET ORAL at 22:04

## 2020-07-31 RX ADMIN — CITALOPRAM 20 MG: 20 TABLET, FILM COATED ORAL at 09:06

## 2020-07-31 RX ADMIN — CARBIDOPA AND LEVODOPA 2 TABLET: 25; 100 TABLET ORAL at 14:43

## 2020-07-31 RX ADMIN — POTASSIUM CHLORIDE 40 MEQ: 1500 TABLET, EXTENDED RELEASE ORAL at 12:45

## 2020-07-31 RX ADMIN — AMANTADINE HYDROCHLORIDE 100 MG: 100 CAPSULE ORAL at 22:04

## 2020-07-31 RX ADMIN — DOXAZOSIN 2 MG: 2 TABLET ORAL at 22:03

## 2020-07-31 RX ADMIN — SODIUM CHLORIDE, PRESERVATIVE FREE 10 ML: 5 INJECTION INTRAVENOUS at 22:04

## 2020-07-31 RX ADMIN — PROPRANOLOL HYDROCHLORIDE 40 MG: 40 TABLET ORAL at 22:03

## 2020-07-31 RX ADMIN — CARBIDOPA AND LEVODOPA 1 TABLET: 25; 100 TABLET, EXTENDED RELEASE ORAL at 14:42

## 2020-07-31 RX ADMIN — PROPRANOLOL HYDROCHLORIDE 40 MG: 40 TABLET ORAL at 13:52

## 2020-07-31 RX ADMIN — SODIUM CHLORIDE TAB 1 GM 1 G: 1 TAB at 12:45

## 2020-07-31 RX ADMIN — PRIMIDONE 50 MG: 50 TABLET ORAL at 22:03

## 2020-07-31 RX ADMIN — CARBIDOPA AND LEVODOPA 2 TABLET: 25; 100 TABLET ORAL at 18:19

## 2020-07-31 RX ADMIN — CARBIDOPA AND LEVODOPA 1 TABLET: 25; 100 TABLET ORAL at 09:10

## 2020-07-31 ASSESSMENT — PAIN SCALES - GENERAL
PAINLEVEL_OUTOF10: 7
PAINLEVEL_OUTOF10: 2
PAINLEVEL_OUTOF10: 7
PAINLEVEL_OUTOF10: 3
PAINLEVEL_OUTOF10: 8

## 2020-07-31 ASSESSMENT — PAIN DESCRIPTION - PAIN TYPE: TYPE: ACUTE PAIN

## 2020-07-31 ASSESSMENT — PAIN DESCRIPTION - DESCRIPTORS: DESCRIPTORS: ACHING

## 2020-07-31 ASSESSMENT — PAIN DESCRIPTION - LOCATION: LOCATION: NECK

## 2020-07-31 NOTE — CARE COORDINATION
Spoke with Lala at [facility] rehab. Patient is approved to go to [facility]. Tom started this morning.

## 2020-07-31 NOTE — PROGRESS NOTES
PROGRESS NOTE          PATIENT NAME: [facility] RECORD NO. [id number]  DATE: 2020  SURGEON: Iban Lozada   PRIMARY CARE PHYSICIAN: Zamzam El MD    HD: # 2    ASSESSMENT    Patient Active Problem List   Diagnosis    Acute ischemic stroke ([address]    SAH (subarachnoid hemorrhage) ([address]    Acute cerebral infarction ([address]    Left hemiparesis ([address]    Parkinson disease ([address]       MEDICAL DECISION MAKING AND PLAN    1. Neuro  1. Small subarachnoid hemorrhage  1. Neurosurgery on board  2. No need for repeat head CT  2. Pain controlled on tylenol, robaxin  3. Plan to restart home Parkinson medications  4. Neurology on board  5. MRI shows possible spinal cord edema, ligamentous injury is not ruled out  1. Will f/u NS recommendations  2. GI  1. Tolerating General Diet  3. Renal/Electrolytes  1. Replace electrolytes as needed  4. LDA  1. Condom catheter  5. Dispo  1. Awaiting PT/OT recs  2. PMR consult      Chief Complaint: \" I have a headache\"    Padmini Ruffin seen and examined. Patient reports no acute events overnight. He has a condom cath in place, is producing adequate urine. Tolerating a diet passing flatus. Complaining of some pain in his neck. Concern regarding proper levodopa dosing. OBJECTIVE  VITALS: Temp: Temp: 98.5 °F (36.9 °C)Temp  Av.4 °F (36.9 °C)  Min: 98.1 °F (36.7 °C)  Max: 99.3 °F (32.2 °C) BP Systolic (81IQH), JIX:041 , Min:143 , GUA:449   Diastolic (74YYA), DXA:94, Min:66, Max:91   Pulse Pulse  Av.6  Min: 54  Max: 88 Resp Resp  Av  Min: 13  Max: 23 Pulse ox SpO2  Av.3 %  Min: 89 %  Max: 98 %  GENERAL: alert, no distress  NEURO: CN II-XI grossly intact, patient does have some decreased  strength in the left hand.   Sided facial droop still present  HEENT: Normocephalic, atraumatic  LUNGS: clear to ausculation, without wheezes, rales or rhonci  HEART: normal rate and regular rhythm  ABDOMEN: soft, non-tender, non-distended, bowel sounds spine and severe multilevel degenerate changes of the lumbar spine. No convincing evidence acute fracture traumatic malalignment     Ct Lumbar Spine Wo Contrast    Result Date: 7/29/2020  Moderate severe multilevel degenerate changes of the thoracic spine and severe multilevel degenerate changes of the lumbar spine. No convincing evidence acute fracture traumatic malalignment     Mri Cervical Spine Wo Contrast    Result Date: 7/30/2020  Increased T2 signal in the cervical spinal cord at C1-2 level, likely related to spinal cord edema. Increased T2 signal in the asymmetrically widened right lateral atlanto-dens interval, nonspecific. Injury of the alar ligaments cannot be excluded. Degenerative disc disease as described above, exacerbating congenitally narrow cervical spinal canal. Spinal canal narrowing, moderate at C1-2 with mild spinal cord compression, mild to moderate at C4-5 with mild spinal cord compression, mild at C2-3, C3-4 and C6-7, minimal at C5-6. Foraminal narrowing, moderate to severe at bilateral C4-5 and bilateral C5-6, moderate at left C2-3, bilateral C3-4 and bilateral C6-7. 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 Xr Shoulder Left (min 2 Views)    Result Date: 7/30/2020  1. No acute radiographic finding to account for patient's left shoulder pain. Xr Chest Portable    Result Date: 7/29/2020  No acute findings. Cta Head Neck W Contrast    Result Date: 7/30/2020  Approximately 30% stenosis of the left internal carotid artery in the proximal left carotid bulb and approximately 50-60% stenosis of the left internal carotid artery just distal to the bulb. Atherosclerotic calcification of the right internal carotid artery with approximately 20% stenosis in the region of the bulb. The left vertebral artery originates directly from the aortic arch which is a normal anatomic variant.  No significant abnormality of the

## 2020-07-31 NOTE — PLAN OF CARE
Problem: Skin Integrity:  Goal: Will show no infection signs and symptoms  Description: Will show no infection signs and symptoms  Outcome: Ongoing  Goal: Absence of new skin breakdown  Description: Absence of new skin breakdown  Outcome: Ongoing     Problem: Falls - Risk of:  Goal: Will remain free from falls  Description: Will remain free from falls  Outcome: Ongoing  Goal: Absence of physical injury  Description: Absence of physical injury  Outcome: Ongoing     Problem: Pain:  Goal: Pain level will decrease  Description: Pain level will decrease  Outcome: Ongoing  Goal: Control of acute pain  Description: Control of acute pain  Outcome: Ongoing  Goal: Control of chronic pain  Description: Control of chronic pain  Outcome: Ongoing     Problem: HEMODYNAMIC STATUS  Goal: Patient has stable vital signs and fluid balance  Outcome: Ongoing     Problem: ACTIVITY INTOLERANCE/IMPAIRED MOBILITY  Goal: Mobility/activity is maintained at optimum level for patient  Outcome: Ongoing     Problem: COMMUNICATION IMPAIRMENT  Goal: Ability to express needs and understand communication  Outcome: Ongoing

## 2020-07-31 NOTE — CARE COORDINATION
Transitional Planning  Received VM from [facility] with [facility] they have received pre-cert with start date of Sunday. They have one discharge that was supposed to leave Sunday, but now may be Monday. If that person should discharge Sunday, their weekend liaison Brandi Cuevas ([phone]) will be in contact. Anticipate transfer on Monday.

## 2020-07-31 NOTE — PROGRESS NOTES
0551 -- -- -- 64 14 96 % --   07/31/20 0550 -- -- -- 65 15 96 % --   07/31/20 0549 -- -- -- 66 16 96 % --   07/31/20 0548 -- -- -- 67 16 96 % --   07/31/20 0547 -- -- -- 65 14 96 % --   07/31/20 0546 -- -- -- 65 14 96 % --   07/31/20 0545 -- -- -- 66 15 96 % --   07/31/20 0544 -- -- -- 67 16 97 % --   07/31/20 0543 -- -- -- 68 16 97 % --   07/31/20 0542 -- -- -- 67 15 96 % --   07/31/20 0541 -- -- -- 66 14 95 % --   07/31/20 0540 -- -- -- 65 14 96 % --   07/31/20 0539 -- -- -- 65 14 96 % --   07/31/20 0538 -- -- -- 66 15 96 % --   07/31/20 0537 -- -- -- 65 14 96 % --   07/31/20 0536 -- -- -- 65 14 96 % --   07/31/20 0535 -- -- -- 65 15 96 % --   07/31/20 0534 -- -- -- 66 14 96 % --   07/31/20 0533 -- -- -- 66 14 96 % --   07/31/20 0532 -- -- -- 67 16 95 % --   07/31/20 0531 -- -- -- 66 15 96 % --   07/31/20 0530 (!) 159/77 -- -- 67 13 95 % --   07/31/20 0529 -- -- -- 67 13 95 % --   07/31/20 0528 -- -- -- 67 13 95 % --   07/31/20 0527 -- -- -- 67 13 95 % --   07/31/20 0526 -- -- -- 68 14 95 % --   07/31/20 0525 -- -- -- 68 14 95 % --   07/31/20 0524 -- -- -- 68 14 96 % --   07/31/20 0523 -- -- -- 68 14 96 % --   07/31/20 0522 -- -- -- 69 16 95 % --   07/31/20 0521 -- -- -- 69 16 96 % --   07/31/20 0520 -- -- -- 68 17 95 % --   07/31/20 0519 -- -- -- 68 15 96 % --   07/31/20 0518 -- -- -- 69 16 95 % --   07/31/20 0517 -- -- -- 69 16 95 % --   07/31/20 0516 -- -- -- 69 18 95 % --   07/31/20 0515 -- -- -- 70 15 96 % --   07/31/20 0514 -- -- -- 70 15 95 % --   07/31/20 0513 -- -- -- 70 17 95 % --   07/31/20 0512 -- -- -- 73 17 95 % --   07/31/20 0511 -- -- -- 73 18 95 % --   07/31/20 0510 -- -- -- 73 16 95 % --   07/31/20 0509 -- -- -- 71 19 95 % --   07/31/20 0508 -- -- -- 71 21 96 % --   07/31/20 0507 -- -- -- 72 17 95 % --   07/31/20 0506 -- -- -- 70 17 96 % --   07/31/20 0505 -- -- -- 70 16 96 % --   07/31/20 0504 -- -- -- 71 17 96 % --   07/31/20 0503 -- -- -- 71 18 96 % --   07/31/20 0502 -- -- -- 74 18 94 % -- 07/31/20 0501 -- -- -- 74 19 96 % --   07/31/20 0500 (!) 164/78 -- -- 74 17 97 % --   07/31/20 0459 -- -- -- 76 20 97 % --   07/31/20 0458 -- -- -- 77 16 96 % --   07/31/20 0457 -- -- -- 75 16 97 % --   07/31/20 0456 -- -- -- 72 17 97 % --   07/31/20 0455 -- -- -- 74 -- 97 % --   07/31/20 0454 -- -- -- -- -- 96 % --   07/31/20 0453 -- -- -- -- -- 97 % --   07/31/20 0452 -- -- -- 76 -- 96 % --   07/31/20 0451 -- -- -- 75 -- 96 % --   07/31/20 0450 -- -- -- 74 -- 96 % --   07/31/20 0449 -- -- -- 76 -- 96 % --   07/31/20 0448 -- -- -- 77 -- 95 % --   07/31/20 0447 -- -- -- 76 -- 96 % --   07/31/20 0446 -- -- -- 77 -- 96 % --   07/31/20 0445 -- -- -- 76 -- 96 % --   07/31/20 0444 -- -- -- 78 -- 96 % --   07/31/20 0443 -- -- -- 77 -- 95 % --   07/31/20 0442 -- -- -- 77 -- 96 % --   07/31/20 0441 -- -- -- 77 -- 96 % --   07/31/20 0440 -- -- -- 77 -- 96 % --   07/31/20 0439 -- -- -- 75 -- 96 % --   07/31/20 0438 -- -- -- 76 -- 96 % --   07/31/20 0437 -- -- -- 77 -- 95 % --   07/31/20 0436 -- -- -- 75 -- 96 % --   07/31/20 0435 -- -- -- 76 -- 96 % --   07/31/20 0434 -- -- -- 77 -- 97 % --   07/31/20 0433 -- -- -- 75 -- 96 % --   07/31/20 0432 -- -- -- 77 -- 93 % --   07/31/20 0431 -- -- -- 76 -- 94 % --   07/31/20 0430 (!) 175/85 -- -- 75 -- 96 % --   07/31/20 0429 -- -- -- 76 18 96 % --   07/31/20 0428 -- -- -- 74 16 96 % --   07/31/20 0427 -- -- -- 76 19 95 % --   07/31/20 0426 -- -- -- 75 18 95 % --       I/O last 3 completed shifts: In: 0233 [P.O.:1260; I.V.:342]  Out: 850 [Urine:850]  No intake/output data recorded. Radiology: Xr Hand Right (min 3 Views)    Result Date: 7/30/2020  Degenerative changes of the hand. Xr Foot Left (min 3 Views)    Result Date: 7/30/2020  1. No acute radiographic finding to account for patient's left foot pain. 2. Moderate 1st MTP degenerative changes. 3. Moderate midfoot degenerative changes. 4. Hammertoe deformity of the 2nd digit.      Ct Head Wo Contrast    Result Date: 7/30/2020  Cerebral atrophy. Chronic small vessel ischemic changes. Area of hypoattenuation in the left frontal periventricular white matter which likely represents an infarct of indeterminate age. Questionable subtle hyperattenuation over the right frontal convexity which is suspicious for a tiny subarachnoid hemorrhage. Report was called and discussed with Dr. Roseann Machado, 07/29/2020 at 2:16 p.m. Ct Cervical Spine Wo Contrast    Result Date: 7/29/2020  Abnormal C1-C2 relationship which may be related to positioning. If there is concern for ligamentous injury, MRI is recommended. No acute fracture. Severe multilevel degenerative changes. Ct Thoracic Spine Wo Contrast    Result Date: 7/29/2020  Moderate severe multilevel degenerate changes of the thoracic spine and severe multilevel degenerate changes of the lumbar spine. No convincing evidence acute fracture traumatic malalignment     Ct Lumbar Spine Wo Contrast    Result Date: 7/29/2020  Moderate severe multilevel degenerate changes of the thoracic spine and severe multilevel degenerate changes of the lumbar spine. No convincing evidence acute fracture traumatic malalignment     Mri Cervical Spine Wo Contrast    Result Date: 7/30/2020  Increased T2 signal in the cervical spinal cord at C1-2 level, likely related to spinal cord edema. Increased T2 signal in the asymmetrically widened right lateral atlanto-dens interval, nonspecific. Injury of the alar ligaments cannot be excluded. Degenerative disc disease as described above, exacerbating congenitally narrow cervical spinal canal. Spinal canal narrowing, moderate at C1-2 with mild spinal cord compression, mild to moderate at C4-5 with mild spinal cord compression, mild at C2-3, C3-4 and C6-7, minimal at C5-6. Foraminal narrowing, moderate to severe at bilateral C4-5 and bilateral C5-6, moderate at left C2-3, bilateral C3-4 and bilateral C6-7.  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 Xr Shoulder Left (min 2 Views)    Result Date: 7/30/2020  1. No acute radiographic finding to account for patient's left shoulder pain. Xr Chest Portable    Result Date: 7/29/2020  No acute findings. Cta Head Neck W Contrast    Result Date: 7/30/2020  Approximately 30% stenosis of the left internal carotid artery in the proximal left carotid bulb and approximately 50-60% stenosis of the left internal carotid artery just distal to the bulb. Atherosclerotic calcification of the right internal carotid artery with approximately 20% stenosis in the region of the bulb. The left vertebral artery originates directly from the aortic arch which is a normal anatomic variant. No significant abnormality of the intracranial circulation. Incidental note is made of fetal origin of the right posterior cerebral artery which is a normal anatomic variant. Ct Chest Abdomen Pelvis W Contrast    Result Date: 7/30/2020  CT chest: Emphysematous changes. No extrapleural air, effusion or acute pulmonary findings. Atherosclerotic disease. CT abdomen and pelvis: No solid organ injury. No acute abdominopelvic process. Osseous findings as above. Mri Limited Brain    Result Date: 7/30/2020  Punctate 3 mm acute to subacute infarct in the posterior right frontal lobe adjacent to the falx. No other areas of restricted diffusion. There is subarachnoid hemorrhage over the high right parietal lobe and posterior right frontal lobe. Cerebral atrophy. Chronic small vessel ischemic changes.        PHYSICAL EXAM:   GCS:  4 - Opens eyes on own   6 - Follows simple motor commands  5 - Alert and oriented    Pupil size:  Left 3 mm Right 3 mm  Pupil reaction: Yes  Wiggles fingers: Left Yes Right Yes  Hand grasp:   Left decreased   Right normal  Wiggles toes: Left Yes    Right Yes    BP (!) 154/84   Pulse 75   Temp 98.1 °F (36.7 °C) (Oral)   Resp 20   Ht 5' 6\" (1.676 m)   Wt 162 lb (73.5 kg)   SpO2 96%   BMI 26.15 kg/m²   General appearance: alert, appears stated age and cooperative  Head: Normocephalic, without obvious abnormality, atraumatic, drooping of L face  Eyes: conjunctivae/corneas clear. PERRL, EOM's intact. Fundi benign. Nose: Nares normal. Septum midline. Mucosa normal. No drainage or sinus tenderness.   Throat: lips, mucosa, and tongue normal; teeth and gums normal  Neck: supple, symmetrical, trachea midline and thyroid not enlarged, symmetric, no tenderness/mass/nodules  Lungs: clear to auscultation bilaterally  Chest wall: no tenderness  Heart: regular rate and rhythm and S1, S2 normal  Abdomen: soft, non-tender; bowel sounds normal; no masses,  no organomegaly  Extremities: extremities normal, no cyanosis or edema, weakness to LUE, pain to palpation L shoulder, R hand, L foot  Pulses: 2+ and symmetric    Spine:     Spine Tenderness ROM   Cervical 7 /10 Not Indicated   Thoracic 0 /10 Normal   Lumbar 0 /10 Normal     Musculoskeletal    Joint Tenderness Swelling ROM   Right shoulder absent absent normal   Left shoulder present present abnormal - decreased   Right elbow absent absent normal   Left elbow absent absent normal   Right wrist absent absent normal   Left wrist absent absent normal   Right hand grasp present absent normal   Left hand grasp absent absent abnormal - decreased, weakness   Right hip absent absent normal   Left hip absent absent normal   Right knee absent absent normal   Left knee absent absent normal   Right ankle absent absent normal   Left ankle absent absent normal   Right foot absent absent normal   Left foot present absent abnormal - decreased           CONSULTS: neurosurgery, Neurology    PROCEDURES: n/a    INJURIES:  SAH, ?cervical spinal cord edema      Patient Active Problem List   Diagnosis    Acute ischemic stroke ([address]    SAH (subarachnoid hemorrhage) ([address]    Acute cerebral infarction ([address]    Left hemiparesis ([address]    Parkinson disease ([address] Assessment/Plan:     1. Neuro  1. Small subarachnoid hemorrhage  1. Neurosurgery on board  2. No need for repeat head CT  2. Pain controlled on tylenol, robaxin  3. Plan to restart home Parkinson medications  4. Neurology on board  5. MRI shows possible spinal cord edema, ligamentous injury is not ruled out  1. Will f/u NS recommendations  2. GI  1. Tolerating General Diet  3. Renal/Electrolytes  1. Replace electrolytes as needed  4. LDA  1. Condom catheter  5. Dispo  1. Awaiting PT/OT recs  2.  PMR consult        Izzy Orona DO PGY 2  General Surgery Resident  07/31/20 12:38 PM

## 2020-07-31 NOTE — PROGRESS NOTES
Neurosurgery ARYA/Resident    Daily Progress Note   CC:  Chief Complaint   Patient presents with    Altered Mental Status     Last known well 1210     7/31/2020  10:42 AM    Chart reviewed. No acute events overnight. No new complaints. Was paged by the trauma team to come and evaluate patient    MRI  cervical spine was ordered by neurology: Showing increased T2 signal of cervical spinal cord at C1 and 2 level related to spinal cord edema. Increased T2 signal in the asymmetrical widening right lateral atlanto-dens interval nonspecific. Injury of the Ehler ligaments cannot be excluded. Spinal cord narrowing C1-2 moderate mild to moderate C4-5 mild C2-7.     Patient reports he uses assistive devices at home such as cane walker and wheelchair      Vitals:    07/31/20 0559 07/31/20 0600 07/31/20 0603 07/31/20 0730   BP:    (!) 162/83   Pulse: 65 65  71   Resp: 13 14  16   Temp:    98.5 °F (36.9 °C)   TempSrc:    Oral   SpO2: 95% 96%  96%   Weight:   162 lb (73.5 kg)    Height:           PE:   NEUROLOGIC:  EYE OPENING     Spontaneous - 4 [x]       To voice - 3 []       To pain - 2 []       None - 1 []    VERBAL RESPONSE     Appropriate, oriented - 5 [x]       Dazed or confused - 4 []       Syllables, expletives - 3 []       Grunts - 2 []       None - 1 []    MOTOR RESPONSE     Spontaneous, command - 6 [x]       Localizes pain - 5 []       Withdraws pain - 4 []       Abnormal flexion - 3 []       Abnormal extension - 2 []       None - 1 []             Total GCS: 15       AOx3   PERRL, EOMI  Cranial Nerves:    II: Visual acuity:  normal  III: Pupils:  equal, round, reactive to light  III,IV,VI: Extra Ocular Movements:intact  V: Facial sensation:  intact  VII: Facial strength: intact, very minimal left-sided facial droop noted  VIII: Hearing:  intact  IX: Palate:  intact  XI: Shoulder shrug: intact  XII: Tongue movement: intact      Motor   L deltoid 4+/5; R deltoid 5/5  L biceps 4+/5; R biceps 5/5  L triceps 5/5; R triceps 5/5  L wrist extension 5/5; R wrist extension 5/5  5/5 bilateral HG     L iliopsoas 5/5 , R iliopsoas 5/5  L quadriceps 5/5; R quadriceps 5/5  L Dorsiflexion 5/5; R dorsiflexion 5/5  L Plantarflexion 5/5; R plantarflexion 5/5  L EHL 5/5; R EHL 5/5    Sensation: Intact, denies numbness tingling and pain to BUE, BLE      Lab Results   Component Value Date    WBC 8.7 07/31/2020    HGB 14.0 07/31/2020    HCT 43.0 07/31/2020     07/31/2020    CHOL 146 07/30/2020    TRIG 48 07/30/2020    HDL 65 07/30/2020     (L) 07/31/2020    K 3.6 (L) 07/31/2020    CL 93 (L) 07/31/2020    CREATININE 0.49 (L) 07/31/2020    BUN 12 07/31/2020    CO2 22 07/31/2020    TSH 1.44 07/29/2020    INR 1.0 07/29/2020    LABA1C 5.2 07/30/2020       Radiology     Mri Cervical Spine Wo Contrast    Result Date: 7/30/2020  EXAMINATION: MRI OF THE CERVICAL SPINE WITHOUT CONTRAST 7/30/2020 6:12 pm TECHNIQUE: Multiplanar multisequence MRI of the cervical spine was performed without the administration of intravenous contrast. COMPARISON: CT cervical spine July 29, 2020 HISTORY: ORDERING SYSTEM PROVIDED HISTORY: possible unstable spine TECHNOLOGIST PROVIDED HISTORY: possible unstable spine Reason for Exam: possible unstable spine FINDINGS: BONES/ALIGNMENT: There is straightening of normal cervical lordosis. There is partial osseous fusion at the posterior aspects of C2-3 and C3-4. There are mild chronic compression deformities at the superior endplates of T1 and T2. The cervical vertebral body heights are grossly maintained, without acute fracture or destructive osseous lesion. There is 2 mm C4 on C5 anterolisthesis, 1-2 mm C6 on C7 anterolisthesis. There is degenerative disc disease with disc desiccation, disc space narrowing and endplate changes. 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 SPINAL CORD: There is increased T2 signal in the cervical spinal cord at C1-2 level, likely related to spinal cord edema.   There are C4-5 and bilateral C5-6, moderate at left C2-3, bilateral C3-4 and bilateral C6-7. 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 A/P  81 y.o. male who presents with originally with fall  Subarachnoid hemorrhage  MRI cervical spine shows C1-2 cord edema    -These findings will be discussed with Dr. Vinnie Haas  -Neurosurgery previously signed off we were contacted by trauma team regarding new findings on MRI cervical spine which was ordered by neurology      Please contact neurosurgery with any changes in patients neurologic status. Lian Tavares CNP  20  10:42 AM      Neurosurgery attendin2020     I have reviewed the chart, studied the images, and examined the patient personally and agree with the ARYA/Resident except with following addendum:     The patient is a 81 y.o. male who presents after fall with left upper more then lower extremity weakness with MRI evidence of C1/2 cord compression and contusion, there is also stenosis at less degree C4/5.      On exam left his left hand intrinsic 3/5, finger extension 3/5, bicep 4/5, deltoid 3/5  Lower extremities right 5/5, left 4/5, left babinski positive      I spoke to patient and family at length about diagnosis and treatment. The best chance for maximum recovering is surgical decompression. Alternatively he could try rehabilitation and have surgery later at the risk of progressive spinal cord dysfunction.      They will think about this and in the mean time, he will need medical/cardiac clearance for C1/2 laminectomy, C4/5 laminectomy and fusion     Medical management for acute cord injury would include optimizing cord perfusion with MAP target >85 if not too demanding on the heart     X. Jeniffer Pedro DO  Neurosurgeon

## 2020-07-31 NOTE — PROGRESS NOTES
Speech Language Pathology  Speech Language Pathology  [address]     Cognitive Treatment Note    Date: 7/31/2020  Patients Name: Sivan Pro  MRN: [id number]  Diagnosis:   Patient Active Problem List   Diagnosis Code    Acute ischemic stroke ([facility] [address] I63.9    SAH (subarachnoid hemorrhage) ([facility]) I60.9    Acute cerebral infarction ([facility] [address] I63.9    Left hemiparesis ([facility] [address] G81.94    Parkinson disease ([facility] [address] G20       Pain: 0/10    Cognitive Treatment    Treatment time: 9:55-10:23    Subjective: [x] Alert [x] Cooperative     [] Confused     [] Agitated    [] Lethargic    Objective/Assessment:  Attention: maintained throughout session, distractions minimized    Recall: Pt provided education re: memory compensatory strategies to aid in recall. ST discussed rehearsal, imagery, and association as strategies to facilitate recall. Delayed recall, 3 units: 2/3 increased to 3/3 x1 with min verbal cues (12-minute delay)   Memory and mental manipulation, size: 8/10 increased to 10/10 with min verbal cues and repetitions     Problem Solving/Reasoning:  Word generation, concrete, 8 units: 0/3 increased to 3/3 with mod-max verbal cues. Pt with tangential speech likely related to avoidance of task. Pt easily redirected with verbal cues. Other: Pt provided education on communication strategies to enhance speech clarity with good return. 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 Pt also with mask-like expression. Pt with L-side facial droop and L-side facial weakness. Decreased L-side labial ROM and coordination as well as decreased lingual coordination. Pt educated on role of OM/EX in facial weakness with good return. Copy of exercises left at bedside. Pt. Seen for O/M treatment program for facial weakness.  Pt. Completed O/M exercises X10 X1 sets with

## 2020-07-31 NOTE — PROGRESS NOTES
Neurology Resident Progress Note      SUBJECTIVE:  This is a 81 y.o.  male admitted 7/29/2020 for [facility] (subarachnoid hemorrhage) ([facility] [I60.9]  This is a follow-up neurology progress note. The patient was seen and examined and the chart was reviewed. There were no acute events overnight. ROS  Constitutional: no fever, chills, fatigue  HENT: No change in vision or hearing   Respiratory: No cough, SOB, wheezing. Cardiovascular:  No chest pain, palpitations, leg swelling. Gastrointestinal: No nausea, vomiting, diarrhea. Genitourinary: No increased frequency, urgency. Musculoskeletal: No myalgia or arthralgia. Skin: No rashes or scarring or bruises. Neurological: No headache, paresthesia, or focal weakness. Endo/Heme/Allergies: Negative for itchy eyes or runny nose. Psychiatric/Behavioral: No anxiety or depressed mood. HPI  See H&P     citalopram  20 mg Oral Daily    primidone  50 mg Oral BID    amantadine  100 mg Oral BID    carbidopa-levodopa  1 tablet Oral BID    atorvastatin  20 mg Oral Nightly    carbidopa-levodopa  1 tablet Oral 4x Daily    sodium chloride flush  10 mL Intravenous 2 times per day    sodium chloride flush  10 mL Intravenous 2 times per day    acetaminophen  1,000 mg Oral 3 times per day    methocarbamol  750 mg Oral TID    senna  1 tablet Oral Daily    labetalol  5 mg Intravenous Once       Past Medical History:   Diagnosis Date    BPH (benign prostatic hyperplasia)     Cancer ([facility]     basal cell carcinoma    Cervical spondylolysis     Depression     Hyperlipidemia     Hypertension     Neck pain     Neuropathy     Orofacial dyskinesia     Osteoarthritis     Parkinson's disease ([facility]        Past Surgical History:   Procedure Laterality Date    JOINT REPLACEMENT         PHYSICAL EXAM:      Blood pressure (!) 162/83, pulse 71, temperature 98.5 °F (36.9 °C), temperature source Oral, resp.  rate 16, height 5' 6\" (1.676 m), weight 162 lb (73.5 kg), SpO2 96 %. General Examination    General Resting comfortably in bed   Head Normocephalic, without obvious abnormality   Neck Supple, symmetrical. Good ROM. No midline or paraspinal tenderness. Lungs Respirations unlabored, no wheezing   Chest Wall No deformity   Heart RRR, no murmur   Abdomen Soft. Non-tender, non-distended   Extremities No cyanosis or edema or warmth. Pulses 2+ and symmetric   Skin: Skin  turgor normal, no rashes or lesions     Mental status  Speech Alert. Oriented to person, place, and time. Speech is fluent without paraphasic errors  Good repetition and naming  Can do 1 step, 2 step, and cross-body commands  Can spell world backwards. Language appropriate. No hallucinations or delusions. No SI/HI. Cranial nerves   II - VFF, visual threat intact  III, IV, VI - extra-ocular muscles full. No nystagmus. Pupils symmetric and responsive.    V - sensation symmetric         VII -mild decreased left NLF  VIII - intact hearing to conversational tone          IX, X - symmetrical palate elevation   XI - 5/5 strength symmetric  XII - tongue midline   Motor function  Strength: Left arm and leg 4/5 strength  Right arm and leg 5 out of 5 strength  -Rigidity bilateral upper and lower extremities  Bulk: grossly normal no atrophy  Tone: symmetric b/l arms and legs  Abnormal movements: No abnormal movements or tremor   Sensory function Symmetric to touch in all extremities bilaterally   Cerebellar No dysmetria or dysdiadochocinesia    Reflex function DTR: Reflexes are brisk on left side  Babinski b/l plantar downgoing   Gait                  Not assessed       Investigations:      Laboratory Testing:  Recent Results (from the past 24 hour(s))   LIPID PANEL    Collection Time: 07/30/20  2:33 PM   Result Value Ref Range    Cholesterol 146 <200 mg/dL    HDL 65 >40 mg/dL    LDL Cholesterol 71 0 - 130 mg/dL    Chol/HDL Ratio 2.2 <5    Triglycerides 48 <150 mg/dL    VLDL NOT REPORTED 1 - 30 mg/dL HISTORY: hand pain Reason for Exam: hand pain Acuity: Unknown FINDINGS: Paralyzed osseous structure triscaphe and thumb CMC degenerative change. Degenerative changes of the 1st MCP and IP joint. Degenerative changes of the 3rd PIP joint. hook osteophytes of the 2nd 3rd metacarpals. Degenerative changes of the hand. Xr Foot Left (min 3 Views)    Result Date: 7/30/2020  EXAMINATION: THREE XRAY VIEWS OF THE LEFT FOOT 7/30/2020 11:44 am COMPARISON: None HISTORY: ORDERING SYSTEM PROVIDED HISTORY: foot pain TECHNOLOGIST PROVIDED HISTORY: foot pain Reason for Exam: foot pain Acuity: Unknown Acute left foot pain. Initial encounter. FINDINGS: Moderate 1st MTP degenerative changes. Moderate midfoot degenerative changes. Lisfranc alignment is maintained. Hammertoe deformity of the 2nd digit. 1. No acute radiographic finding to account for patient's left foot pain. 2. Moderate 1st MTP degenerative changes. 3. Moderate midfoot degenerative changes. 4. Hammertoe deformity of the 2nd digit. Ct Head Wo Contrast    Result Date: 7/30/2020  EXAMINATION: CT OF THE HEAD WITHOUT CONTRAST,  7/29/2020 1:40 pm TECHNIQUE: CT of the head was performed without the administration of intravenous contrast. Dose modulation, iterative reconstruction, and/or weight based adjustment of the mA/kV was utilized to reduce the radiation dose to as low as reasonably achievable. COMPARISON: None HISTORY: ORDERING SYSTEM PROVIDED HISTORY: Stroke, L sided weakness. LKW 1210 pm TECHNOLOGIST PROVIDED HISTORY: Stroke, L sided weakness. LKW 1210 pm Reason for Exam: Stroke, L sided weakness. LKW 1210 pm Acuity: Unknown Type of Exam: Unknown Initial evaluation FINDINGS: BRAIN/VENTRICLES: There is mild cerebral atrophy. There is atherosclerotic calcification of the cavernous carotid arteries.  There are areas of hypoattenuation in the periventricular white matter and centrum semiovale that are likely related to chronic small vessel ischemic Severe multilevel degenerative changes. Ct Thoracic Spine Wo Contrast    Result Date: 7/29/2020  EXAMINATION: CT OF THE THORACIC SPINE WITHOUT CONTRAST; CT OF THE LUMBAR SPINE WITHOUT CONTRAST 7/29/2020 TECHNIQUE: CT of the thoracic spine was performed without the administration of intravenous contrast. Multiplanar reformatted images are provided for review. Dose modulation, iterative reconstruction, and/or weight based adjustment of the mA/kV was utilized to reduce the radiation dose to as low as reasonably achievable.; CT of the lumbar spine was performed without the administration of intravenous contrast. Multiplanar reformatted images are provided for review. Dose modulation, iterative reconstruction, and/or weight based adjustment of the mA/kV was utilized to reduce the radiation dose to as low as reasonably achievable. COMPARISON: CT chest abdomen pelvis 07/29/2020 HISTORY: ORDERING SYSTEM PROVIDED HISTORY: trauma TECHNOLOGIST PROVIDED HISTORY: trauma Reason for Exam: fall Acuity: Acute Type of Exam: Initial; ORDERING SYSTEM PROVIDED HISTORY: TRAUMA TECHNOLOGIST PROVIDED HISTORY: trauma Reason for Exam: fall Acuity: Acute Type of Exam: Initial FINDINGS: BONES/ALIGNMENT: There is no acute fracture or traumatic malalignment. Central plate depression X6-Q5 appears chronic. DEGENERATIVE CHANGES: Severe multilevel degenerate changes. Multilevel ankylosis identified. Slight widening of the disc spaces identified at T5-T6 and T12-L1 likely degenerative severe multilevel disc space disease involving the lumbar spine with multilevel vacuum disc phenomena. SOFT TISSUES: No paraspinal mass is seen. Moderate severe multilevel degenerate changes of the thoracic spine and severe multilevel degenerate changes of the lumbar spine.  No convincing evidence acute fracture traumatic malalignment     Ct Lumbar Spine Wo Contrast    Result Date: 7/29/2020  EXAMINATION: CT OF THE THORACIC SPINE WITHOUT CONTRAST; CT OF THE LUMBAR SPINE WITHOUT CONTRAST 7/29/2020 TECHNIQUE: CT of the thoracic spine was performed without the administration of intravenous contrast. Multiplanar reformatted images are provided for review. Dose modulation, iterative reconstruction, and/or weight based adjustment of the mA/kV was utilized to reduce the radiation dose to as low as reasonably achievable.; CT of the lumbar spine was performed without the administration of intravenous contrast. Multiplanar reformatted images are provided for review. Dose modulation, iterative reconstruction, and/or weight based adjustment of the mA/kV was utilized to reduce the radiation dose to as low as reasonably achievable. COMPARISON: CT chest abdomen pelvis 07/29/2020 HISTORY: ORDERING SYSTEM PROVIDED HISTORY: trauma TECHNOLOGIST PROVIDED HISTORY: trauma Reason for Exam: fall Acuity: Acute Type of Exam: Initial; ORDERING SYSTEM PROVIDED HISTORY: TRAUMA TECHNOLOGIST PROVIDED HISTORY: trauma Reason for Exam: fall Acuity: Acute Type of Exam: Initial FINDINGS: BONES/ALIGNMENT: There is no acute fracture or traumatic malalignment. Central plate depression Q9-S7 appears chronic. DEGENERATIVE CHANGES: Severe multilevel degenerate changes. Multilevel ankylosis identified. Slight widening of the disc spaces identified at T5-T6 and T12-L1 likely degenerative severe multilevel disc space disease involving the lumbar spine with multilevel vacuum disc phenomena. SOFT TISSUES: No paraspinal mass is seen. Moderate severe multilevel degenerate changes of the thoracic spine and severe multilevel degenerate changes of the lumbar spine.  No convincing evidence acute fracture traumatic malalignment     Mri Cervical Spine Wo Contrast    Result Date: 7/30/2020  EXAMINATION: MRI OF THE CERVICAL SPINE WITHOUT CONTRAST 7/30/2020 6:12 pm TECHNIQUE: Multiplanar multisequence MRI of the cervical spine was performed without the administration of intravenous contrast. COMPARISON: CT cervical spine July 29, 2020 HISTORY: ORDERING SYSTEM PROVIDED HISTORY: possible unstable spine TECHNOLOGIST PROVIDED HISTORY: possible unstable spine Reason for Exam: possible unstable spine FINDINGS: BONES/ALIGNMENT: There is straightening of normal cervical lordosis. There is partial osseous fusion at the posterior aspects of C2-3 and C3-4. There are mild chronic compression deformities at the superior endplates of T1 and T2. The cervical vertebral body heights are grossly maintained, without acute fracture or destructive osseous lesion. There is 2 mm C4 on C5 anterolisthesis, 1-2 mm C6 on C7 anterolisthesis. There is degenerative disc disease with disc desiccation, disc space narrowing and endplate changes. 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 SPINAL CORD: There is increased T2 signal in the cervical spinal cord at C1-2 level, likely related to spinal cord edema. 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 SOFT TISSUES: There is increased T2 signal in the asymmetrically widened right lateral atlanto-dens interval, nonspecific. Injury of the alar ligaments cannot be excluded. C1-2: There is moderate narrowing of the spinal canal with mild spinal cord compression. C2-C3: There is no significant disc herniation. There is mild spinal canal narrowing, moderate left foraminal narrowing. There is mild bilateral facet arthrosis with fusion of the bilateral facet joints. C3-C4: There is no significant disc herniation. There is mild spinal canal narrowing, moderate bilateral foraminal narrowing. There is moderate left facet arthrosis with fusion of the facet joint. C4-C5: There is anterolisthesis, disc bulge, severe left and mild right facet arthrosis, with mild-to-moderate spinal canal narrowing, mild spinal cord compression, moderate to severe bilateral foraminal narrowing.  C5-C6: There is disc bulge, severe left and mild right facet arthrosis, with minimal spinal canal narrowing, moderate to severe bilateral foraminal narrowing. C6-C7: There is disc bulge, moderate left and mild right facet arthrosis, with mild spinal canal narrowing, and moderate bilateral foraminal narrowing. C7-T1: There is no significant disc protrusion, spinal canal stenosis or neural foraminal narrowing. Increased T2 signal in the cervical spinal cord at C1-2 level, likely related to spinal cord edema. Increased T2 signal in the asymmetrically widened right lateral atlanto-dens interval, nonspecific. Injury of the alar ligaments cannot be excluded. Degenerative disc disease as described above, exacerbating congenitally narrow cervical spinal canal. Spinal canal narrowing, moderate at C1-2 with mild spinal cord compression, mild to moderate at C4-5 with mild spinal cord compression, mild at C2-3, C3-4 and C6-7, minimal at C5-6. Foraminal narrowing, moderate to severe at bilateral C4-5 and bilateral C5-6, moderate at left C2-3, bilateral C3-4 and bilateral C6-7. 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 Xr Shoulder Left (min 2 Views)    Result Date: 7/30/2020  EXAMINATION: TWO XRAY VIEWS OF THE LEFT SHOULDER 7/30/2020 11:44 am COMPARISON: None HISTORY: ORDERING SYSTEM PROVIDED HISTORY: shoulder pain TECHNOLOGIST PROVIDED HISTORY: shoulder pain Reason for Exam: shoulder pain Acuity: Unknown Acute left shoulder pain. FINDINGS: The humeral head aligns normally with the glenoid fossae. Coracoclavicular and acromioclavicular interval are within normal range. No visible fracture. No dislocation. There is a calcified granuloma in the left upper lobe. 1. No acute radiographic finding to account for patient's left shoulder pain. Xr Chest Portable    Result Date: 7/29/2020  EXAMINATION: ONE XRAY VIEW OF THE CHEST 7/29/2020 3:56 pm COMPARISON: None.  HISTORY: ORDERING SYSTEM PROVIDED HISTORY: pCO2 high, no SOB TECHNOLOGIST PROVIDED HISTORY: base. VERTEBRAL ARTERIES: The right vertebral artery is dominant and patent in its visualized course. The left vertebral artery originates directly from the aortic arch and is patent in its visualized course. SOFT TISSUES: There is no acute abnormality of the visualized soft tissues. BONES: There are mild degenerative changes of the cervical spine. CTA HEAD: ANTERIOR CIRCULATION:  No abnormality of the internal carotid arteries, middle cerebral arteries, or anterior cerebral arteries are identified. POSTERIOR CIRCULATION:  No abnormality of the distal vertebral arteries, basilar artery, or posterior cerebral arteries are identified. Incidental note is made of fetal origin of the right posterior cerebral artery which is a normal anatomic variant. No obvious aneurysms are identified. OTHER: No dural venous sinus thrombosis on this non-dedicated study. Approximately 30% stenosis of the left internal carotid artery in the proximal left carotid bulb and approximately 50-60% stenosis of the left internal carotid artery just distal to the bulb. Atherosclerotic calcification of the right internal carotid artery with approximately 20% stenosis in the region of the bulb. The left vertebral artery originates directly from the aortic arch which is a normal anatomic variant. No significant abnormality of the intracranial circulation. Incidental note is made of fetal origin of the right posterior cerebral artery which is a normal anatomic variant. Ct Chest Abdomen Pelvis W Contrast    Result Date: 7/30/2020  EXAMINATION: CT OF THE CHEST, ABDOMEN, AND PELVIS WITH CONTRAST 7/29/2020 7:05 pm TECHNIQUE: CT of the chest, abdomen and pelvis was performed with the administration of intravenous contrast. Multiplanar reformatted images are provided for review. Dose modulation, iterative reconstruction, and/or weight based adjustment of the mA/kV was utilized to reduce the radiation dose to as low as reasonably achievable. COMPARISON: None. HISTORY: ORDERING SYSTEM PROVIDED HISTORY: trauma TECHNOLOGIST PROVIDED HISTORY: trauma Reason for Exam: fall Acuity: Acute Type of Exam: Initial Patient fell hitting head when opening front door. FINDINGS: Chest: Mediastinum: No mediastinal adenopathy or acute aortic abnormality. Calcification aortic arch is noted. Mild cardiomegaly. Coronary artery calcification. No pericardial effusion. Lungs/pleura: Mild emphysematous changes. No effusion, focal consolidation, or extrapleural air. Tracheobronchial tree is patent. Left upper lobe granuloma. Soft Tissues/Bones: Non-acute appearing fracture C7 spinous process versus soft tissue ligamentous calcification. No acute appearing osseous abnormality. No axillary adenopathy or acute soft tissue abnormality. Abdomen/Pelvis: Organs: Liver, gallbladder, pancreas, spleen, and adrenals are unremarkable. Kidneys contain multiple bilateral cysts largest in the right kidney of 5 cm and in the left kidney of 3.5 cm. No urinary obstruction. GI/Bowel: No free fluid, free air, bowel obstruction or bowel wall thickening. Increased colonic stool load. Pelvis: Bladder is intact. No abnormal fluid collections, pelvic or inguinal adenopathy. Bilateral fat containing inguinal hernias are noted without strangulation. Prostate is mildly enlarged. Peritoneum/Retroperitoneum:   No acute aortic abnormality; no aneurysm. No retroperitoneal or mesenteric adenopathy. Bones/Soft Tissues: Degenerative changes are present in the hips and lower lumbar facets. Multilevel degenerative and degenerative disc changes are noted. CT chest: Emphysematous changes. No extrapleural air, effusion or acute pulmonary findings. Atherosclerotic disease. CT abdomen and pelvis: No solid organ injury. No acute abdominopelvic process. Osseous findings as above.      Mri Limited Brain    Result Date: 7/30/2020  EXAMINATION: MRI OF THE BRAIN WITHOUT CONTRAST  7/29/2020 3:00 pm TECHNIQUE: Multiplanar multisequence MRI of the brain was performed without the administration of intravenous contrast. COMPARISON: CT head 01/29/2020 HISTORY: ORDERING SYSTEM PROVIDED HISTORY: add GRE. Evalaute for stroke TECHNOLOGIST PROVIDED HISTORY: add GRE. Evalaute for stroke Initial evaluation FINDINGS: INTRACRANIAL STRUCTURES/VENTRICLES: There is a punctate area of restricted diffusion measuring 3 mm medially in the posterior left frontal lobe along the falx that is suggestive of an acute infarct or subacute infarct. There is high signal in some of the sulci over the high right parietal lobe with corresponding low signal on the gradient echo images suggestive of subarachnoid hemorrhage. There is subarachnoid hemorrhage in the pre frontal sulcus. There is diffuse cerebral atrophy. There are chronic small vessel ischemic changes. There are several punctate areas of low signal scattered throughout the brain on the gradient echo images suggestive of previous areas of microhemorrhage. The findings were discussed with Dr. Christy Garcia, 07/29/2020 at 3:18 p.m. Punctate 3 mm acute to subacute infarct in the posterior right frontal lobe adjacent to the falx. No other areas of restricted diffusion. There is subarachnoid hemorrhage over the high right parietal lobe and posterior right frontal lobe. Cerebral atrophy. Chronic small vessel ischemic changes.        Assessment & Differential Dx:      Primary Problem  <principal problem not specified>    Active Hospital Problems    Diagnosis Date Noted    Acute cerebral infarction ([address] [I63.9] 07/30/2020    Left hemiparesis ([address] [G81.94] 07/30/2020    Parkinson disease ([address] Colin Paiz 07/30/2020    SAH (subarachnoid hemorrhage) ([address] [I60.9] 07/29/2020       Case of 27-year-old WM with a fall story along with left arm and left leg weakness, Hx of Parkinson with baseline gait apraxia on Sinemet 25/100 twice daily, symmetrel 100 po BID and mysoline 100 mg BID, reporting arthritic right knee to have upcoming knee replacement,  -He hit his head to the wall, no LOC,  -Head CT with right parietal frontal cortical subarachnoid hemorrhage  With possible small acute right parietal infarction with bilateral chronic periventricular small vessel ischemia  -Head cervical spine: Abnormal C1-2 positioning  -CT thoracic spine:  Moderate degenerative changes  -CT lumbar spine: Severe degenerative changes  -CTA head and neck: Left ICA 50-60% stenosis, right ICA 20% stenosis  -MRI head: Right parietal frontal cortical subarachnoid hemorrhage with acute right high parietal infarction with bilateral chronic periventricular small vessel ischemia      Impression:  -Fall with a posttraumatic subarachnoid hemorrhage  -right cerebral infarction left hemiparesis  -Parkinson disease  -unstable cervical spine  -possible underlying dementia    Plan:     -MRI brain W0: Punctuate 3 mm acute to subacute infarct in the posterior right frontal lobe adjacent to the falx, there is subarachnoid hemorrhage over the high right parietal lobe and posterior right frontal lobe, cerebral atrophy  -No TPA due to traumatic subarachnoid hemorrhage  -Trauma surgery and neurosurgery consulted: No neurosurgery intervention needed,  -MRI cervical spine W 0 contrast: Increased T2 signal in the cervical spine cord at C1-2 level likely 2 spinal cord edema, increased 3 to spinal in the soft symmetry widened right lateral atlanto dens, degenerative disc disease, spinal canal narrowing, moderate at C1-2 with mild spinal cord compression  -TTE with bubble study: Pending  -Folic acid 1 mg twice daily  -Aspirin 81 mg  -continue atorvastatin 20 mg nightly  -Hold all antiplatelets and anticoagulants  -Follow-up CT cervical, thoracic and lumbar spine  -Cervical collar  -lipid profile  -A1c  -PT/OT/speech  -hydration  -neurochecks per protocol  -SBP<185  -Blood sugar:<180  -Please avoid dextrose containing solutions             William Ewing MD, 7/31/2020 9:44 AM

## 2020-07-31 NOTE — PROGRESS NOTES
Physical Therapy  DATE: 2020    NAME: Silvana Casey  MRN: [id number]   : 1935    Patient not seen this date for Physical Therapy due to:  [] Blood transfusion in progress  [] Hemodialysis  [x]  Patient Lexa Gallardo saying he does not want to do anything today and that he has general body aches.  [] Spine Precautions   [] Strict Bedrest  [] Surgery/ Procedure  [] Testing      [] Other        [] PT being discontinued at this time. Patient independent. No further needs. [] PT being discontinued at this time as the patient has been transferred to palliative care. No further needs.     Anabel Hi, PTA

## 2020-07-31 NOTE — PROGRESS NOTES
Physical Therapy  Facility/Department: [facility] 4A STEPDOWN  Daily Treatment Note  NAME: Slava Anthony  : 1935  MRN: [id number]    Date of Service: 2020    Discharge Recommendations:  Patient would benefit from continued therapy after discharge   PT Equipment Recommendations  Equipment Needed: (CTA pending progress)    Assessment   Body structures, Functions, Activity limitations: Decreased functional mobility ; Decreased endurance;Decreased balance;Decreased strength;Decreased posture; Increased pain  Assessment: Pt with significantly impaired mobility due to balance deficits and L sided weakness. Pt unable to maintain seated balance unsupported. Fair tolerance standing in milton steady for 5 minutes with 1 seated rest break. Pt will require continued PT upon discharge. Prognosis: Good  PT Education: General Safety;PT Role;Family Education; Functional Mobility Training;Home Exercise Program;Transfer Training;Precautions  Patient Education: c-spine precautions, DVT prevention  REQUIRES PT FOLLOW UP: Yes  Activity Tolerance  Activity Tolerance: Patient limited by endurance; Patient limited by pain; Patient limited by fatigue     Patient Diagnosis(es): The encounter diagnosis was Cerebrovascular accident (CVA), unspecified mechanism ([facility] [address] has a past medical history of BPH (benign prostatic hyperplasia), Cancer ([address] Cervical spondylolysis, Depression, Hyperlipidemia, Hypertension, Neck pain, Neuropathy, Orofacial dyskinesia, Osteoarthritis, and Parkinson's disease ([facility] [address] has a past surgical history that includes joint replacement.     Restrictions  Restrictions/Precautions  Restrictions/Precautions: Fall Risk, General Precautions  Required Braces or Orthoses?: Yes  Required Braces or Orthoses  Cervical: c-collar  Position Activity Restriction  Other position/activity restrictions: Up w/assist; SBP < 185; per NS note \"CTLS recommendations: clear prior to my exam.\" Pt ok for therapy with no restrictions per  Serafin with NS  Subjective   General  Chart Reviewed: Yes  Response To Previous Treatment: Patient with no complaints from previous session. Family / Caregiver Present: Yes(daughter)  Subjective  Subjective: Pt in bed; alert. Agreeable to therapy with encouragement. Reports 6/10 neck pain which increased to 8/10 pain after standing. General Comment  Comments: Pt transferred to chair with milton steady; call light in reach and BLEs elevated at end of session. Pain Screening  Patient Currently in Pain: Yes  Pain Assessment  Pain Level: 8  Pain Type: Acute pain  Pain Location: Neck  Pain Descriptors: Aching  Vital Signs  Patient Currently in Pain: Yes       Orientation  Orientation  Overall Orientation Status: Within Functional Limits  Cognition      Objective   Bed mobility  Supine to Sit: Maximum assistance(required max A for upper trunk; Bret for LEs, completed with HOB elevated)  Scooting: Moderate assistance  Transfers  Sit to Stand: Minimal Assistance;2 Person Assistance(completed with milton steady twice)  Stand to sit: Minimal Assistance  Bed to Chair: Dependent/Total(milton steady)  Ambulation  Ambulation?: No     Balance  Posture: Fair  Sitting - Static: Fair;-(pt sat EOB 4 minutes with modA; demos R lateral lean)  Sitting - Dynamic: Poor;+  Standing - Static: Fair;-(pt stood 2' and then additional 3' in [facility] steady with minAx2)  Standing - Dynamic: Poor;+(completed lateral weight shifting x 45''; pt attempted to march in place but unable to lift L foot)      Goals  Short term goals  Time Frame for Short term goals: 14  Short term goal 1: Pt to perform bed mobility Bret  Short term goal 2: Demonstrate sit to stand transfer Bret  Short term goal 3: Ambulate 100ft w/ RW Bret  Short term goal 4: Tolerate 30 minutes of therapy to demo increased endurance  Patient Goals   Patient goals :  To go home    Plan    Plan  Times per week: 5-6x/week  Current Treatment Recommendations: Strengthening, Transfer Training, Endurance Training, Patient/Caregiver Education & Training, ROM, Balance Training, Gait Training, Home Exercise Program, Functional Mobility Training, Stair training, Safety Education & Training  Safety Devices  Type of devices:  All fall risk precautions in place, Left in chair, Call light within reach, Gait belt, Patient at risk for falls, Nurse notified  Restraints  Initially in place: No     Therapy Time   Individual Concurrent Group Co-treatment   Time In 0204         Time Out 0240         Minutes 36         Timed Code Treatment Minutes: [facility]

## 2020-08-01 ENCOUNTER — ANESTHESIA EVENT (OUTPATIENT)
Dept: OPERATING ROOM | Age: 85
DRG: 957 | End: 2020-08-01
Payer: COMMERCIAL

## 2020-08-01 ENCOUNTER — APPOINTMENT (OUTPATIENT)
Dept: CT IMAGING | Age: 85
DRG: 957 | End: 2020-08-01
Payer: COMMERCIAL

## 2020-08-01 LAB
ABSOLUTE EOS #: <0.03 K/UL (ref 0–0.44)
ABSOLUTE IMMATURE GRANULOCYTE: 0.03 K/UL (ref 0–0.3)
ABSOLUTE LYMPH #: 0.85 K/UL (ref 1.1–3.7)
ABSOLUTE MONO #: 1.03 K/UL (ref 0.1–1.2)
ANION GAP SERPL CALCULATED.3IONS-SCNC: 12 MMOL/L (ref 9–17)
BASOPHILS # BLD: 0 % (ref 0–2)
BASOPHILS ABSOLUTE: <0.03 K/UL (ref 0–0.2)
BUN BLDV-MCNC: 15 MG/DL (ref 8–23)
BUN/CREAT BLD: ABNORMAL (ref 9–20)
CALCIUM SERPL-MCNC: 8.6 MG/DL (ref 8.6–10.4)
CHLORIDE BLD-SCNC: 92 MMOL/L (ref 98–107)
CO2: 22 MMOL/L (ref 20–31)
CREAT SERPL-MCNC: 0.48 MG/DL (ref 0.7–1.2)
DIFFERENTIAL TYPE: ABNORMAL
EOSINOPHILS RELATIVE PERCENT: 0 % (ref 1–4)
GFR AFRICAN AMERICAN: >60 ML/MIN
GFR NON-AFRICAN AMERICAN: >60 ML/MIN
GFR SERPL CREATININE-BSD FRML MDRD: ABNORMAL ML/MIN/{1.73_M2}
GFR SERPL CREATININE-BSD FRML MDRD: ABNORMAL ML/MIN/{1.73_M2}
GLUCOSE BLD-MCNC: 108 MG/DL (ref 70–99)
HCT VFR BLD CALC: 40.1 % (ref 40.7–50.3)
HEMOGLOBIN: 13.5 G/DL (ref 13–17)
IMMATURE GRANULOCYTES: 0 %
LYMPHOCYTES # BLD: 11 % (ref 24–43)
MCH RBC QN AUTO: 32.1 PG (ref 25.2–33.5)
MCHC RBC AUTO-ENTMCNC: 33.7 G/DL (ref 28.4–34.8)
MCV RBC AUTO: 95.5 FL (ref 82.6–102.9)
MONOCYTES # BLD: 13 % (ref 3–12)
NRBC AUTOMATED: 0 PER 100 WBC
PDW BLD-RTO: 13.4 % (ref 11.8–14.4)
PLATELET # BLD: 165 K/UL (ref 138–453)
PLATELET ESTIMATE: ABNORMAL
PMV BLD AUTO: 11.6 FL (ref 8.1–13.5)
POTASSIUM SERPL-SCNC: 4.2 MMOL/L (ref 3.7–5.3)
RBC # BLD: 4.2 M/UL (ref 4.21–5.77)
RBC # BLD: ABNORMAL 10*6/UL
SARS-COV-2, PCR: NORMAL
SARS-COV-2, RAPID: NOT DETECTED
SARS-COV-2: NORMAL
SEG NEUTROPHILS: 76 % (ref 36–65)
SEGMENTED NEUTROPHILS ABSOLUTE COUNT: 5.79 K/UL (ref 1.5–8.1)
SODIUM BLD-SCNC: 126 MMOL/L (ref 135–144)
SOURCE: NORMAL
WBC # BLD: 7.7 K/UL (ref 3.5–11.3)
WBC # BLD: ABNORMAL 10*3/UL

## 2020-08-01 PROCEDURE — U0002 COVID-19 LAB TEST NON-CDC: HCPCS

## 2020-08-01 PROCEDURE — 36415 COLL VENOUS BLD VENIPUNCTURE: CPT

## 2020-08-01 PROCEDURE — 2060000000 HC ICU INTERMEDIATE R&B

## 2020-08-01 PROCEDURE — 6370000000 HC RX 637 (ALT 250 FOR IP): Performed by: NURSE PRACTITIONER

## 2020-08-01 PROCEDURE — 70450 CT HEAD/BRAIN W/O DYE: CPT

## 2020-08-01 PROCEDURE — APPSS45 APP SPLIT SHARED TIME 31-45 MINUTES: Performed by: PHYSICIAN ASSISTANT

## 2020-08-01 PROCEDURE — 85025 COMPLETE CBC W/AUTO DIFF WBC: CPT

## 2020-08-01 PROCEDURE — 80048 BASIC METABOLIC PNL TOTAL CA: CPT

## 2020-08-01 PROCEDURE — 97530 THERAPEUTIC ACTIVITIES: CPT

## 2020-08-01 PROCEDURE — 6370000000 HC RX 637 (ALT 250 FOR IP): Performed by: STUDENT IN AN ORGANIZED HEALTH CARE EDUCATION/TRAINING PROGRAM

## 2020-08-01 PROCEDURE — 6370000000 HC RX 637 (ALT 250 FOR IP): Performed by: PSYCHIATRY & NEUROLOGY

## 2020-08-01 PROCEDURE — 97110 THERAPEUTIC EXERCISES: CPT

## 2020-08-01 PROCEDURE — 97535 SELF CARE MNGMENT TRAINING: CPT

## 2020-08-01 PROCEDURE — 99232 SBSQ HOSP IP/OBS MODERATE 35: CPT | Performed by: NEUROLOGICAL SURGERY

## 2020-08-01 PROCEDURE — 99232 SBSQ HOSP IP/OBS MODERATE 35: CPT | Performed by: PSYCHIATRY & NEUROLOGY

## 2020-08-01 PROCEDURE — 2580000003 HC RX 258: Performed by: INTERNAL MEDICINE

## 2020-08-01 RX ORDER — SODIUM CHLORIDE 1000 MG
1 TABLET, SOLUBLE MISCELLANEOUS
Status: DISCONTINUED | OUTPATIENT
Start: 2020-08-01 | End: 2020-08-04

## 2020-08-01 RX ADMIN — PROPRANOLOL HYDROCHLORIDE 60 MG: 60 CAPSULE, EXTENDED RELEASE ORAL at 21:32

## 2020-08-01 RX ADMIN — METHOCARBAMOL TABLETS 750 MG: 750 TABLET, COATED ORAL at 21:33

## 2020-08-01 RX ADMIN — SODIUM CHLORIDE TAB 1 GM 1 G: 1 TAB at 09:04

## 2020-08-01 RX ADMIN — AMANTADINE HYDROCHLORIDE 100 MG: 100 CAPSULE ORAL at 09:05

## 2020-08-01 RX ADMIN — FINASTERIDE 5 MG: 5 TABLET, FILM COATED ORAL at 09:04

## 2020-08-01 RX ADMIN — CARBIDOPA AND LEVODOPA 1 TABLET: 25; 100 TABLET, EXTENDED RELEASE ORAL at 15:56

## 2020-08-01 RX ADMIN — METHOCARBAMOL TABLETS 750 MG: 750 TABLET, COATED ORAL at 09:04

## 2020-08-01 RX ADMIN — PROPRANOLOL HYDROCHLORIDE 40 MG: 40 TABLET ORAL at 21:32

## 2020-08-01 RX ADMIN — SENNOSIDES 8.6 MG: 8.6 TABLET, FILM COATED ORAL at 09:04

## 2020-08-01 RX ADMIN — CARBIDOPA AND LEVODOPA 1 TABLET: 25; 100 TABLET, EXTENDED RELEASE ORAL at 11:05

## 2020-08-01 RX ADMIN — CARBIDOPA AND LEVODOPA 3 TABLET: 25; 100 TABLET ORAL at 12:47

## 2020-08-01 RX ADMIN — IBUPROFEN 800 MG: 800 TABLET, FILM COATED ORAL at 18:42

## 2020-08-01 RX ADMIN — ACETAMINOPHEN 1000 MG: 500 TABLET ORAL at 06:21

## 2020-08-01 RX ADMIN — DOCUSATE SODIUM 100 MG: 100 CAPSULE, LIQUID FILLED ORAL at 09:04

## 2020-08-01 RX ADMIN — CARBIDOPA AND LEVODOPA 3 TABLET: 25; 100 TABLET ORAL at 09:05

## 2020-08-01 RX ADMIN — CARBIDOPA AND LEVODOPA 2 TABLET: 25; 100 TABLET ORAL at 15:56

## 2020-08-01 RX ADMIN — CARBIDOPA AND LEVODOPA 1 TABLET: 25; 100 TABLET, EXTENDED RELEASE ORAL at 06:21

## 2020-08-01 RX ADMIN — SODIUM CHLORIDE, PRESERVATIVE FREE 10 ML: 5 INJECTION INTRAVENOUS at 09:07

## 2020-08-01 RX ADMIN — CARBIDOPA AND LEVODOPA 2 TABLET: 25; 100 TABLET ORAL at 19:50

## 2020-08-01 RX ADMIN — ACETAMINOPHEN 1000 MG: 500 TABLET ORAL at 12:47

## 2020-08-01 RX ADMIN — METHOCARBAMOL TABLETS 750 MG: 750 TABLET, COATED ORAL at 15:55

## 2020-08-01 RX ADMIN — SODIUM CHLORIDE, PRESERVATIVE FREE 10 ML: 5 INJECTION INTRAVENOUS at 19:50

## 2020-08-01 RX ADMIN — PRIMIDONE 50 MG: 50 TABLET ORAL at 09:04

## 2020-08-01 RX ADMIN — PRIMIDONE 50 MG: 50 TABLET ORAL at 21:32

## 2020-08-01 RX ADMIN — AMANTADINE HYDROCHLORIDE 100 MG: 100 CAPSULE ORAL at 21:33

## 2020-08-01 RX ADMIN — ATORVASTATIN CALCIUM 20 MG: 20 TABLET, FILM COATED ORAL at 19:50

## 2020-08-01 RX ADMIN — ACETAMINOPHEN 1000 MG: 500 TABLET ORAL at 21:33

## 2020-08-01 RX ADMIN — PROPRANOLOL HYDROCHLORIDE 60 MG: 60 CAPSULE, EXTENDED RELEASE ORAL at 00:27

## 2020-08-01 RX ADMIN — DOXAZOSIN 2 MG: 2 TABLET ORAL at 19:50

## 2020-08-01 RX ADMIN — SODIUM CHLORIDE TAB 1 GM 1 G: 1 TAB at 11:05

## 2020-08-01 RX ADMIN — PROPRANOLOL HYDROCHLORIDE 60 MG: 60 CAPSULE, EXTENDED RELEASE ORAL at 09:05

## 2020-08-01 RX ADMIN — CARBIDOPA AND LEVODOPA 1 TABLET: 25; 100 TABLET, EXTENDED RELEASE ORAL at 19:50

## 2020-08-01 RX ADMIN — SODIUM CHLORIDE TAB 1 GM 1 G: 1 TAB at 16:03

## 2020-08-01 RX ADMIN — CITALOPRAM 20 MG: 20 TABLET, FILM COATED ORAL at 09:04

## 2020-08-01 ASSESSMENT — PAIN DESCRIPTION - PAIN TYPE
TYPE: ACUTE PAIN

## 2020-08-01 ASSESSMENT — PAIN SCALES - GENERAL
PAINLEVEL_OUTOF10: 0
PAINLEVEL_OUTOF10: 5
PAINLEVEL_OUTOF10: 5
PAINLEVEL_OUTOF10: 3
PAINLEVEL_OUTOF10: 4
PAINLEVEL_OUTOF10: 5
PAINLEVEL_OUTOF10: 0

## 2020-08-01 ASSESSMENT — PAIN DESCRIPTION - LOCATION
LOCATION: NECK
LOCATION: NECK
LOCATION: NECK;HEAD

## 2020-08-01 ASSESSMENT — PAIN DESCRIPTION - DESCRIPTORS
DESCRIPTORS: ACHING
DESCRIPTORS: ACHING

## 2020-08-01 NOTE — PROGRESS NOTES
Physical Therapy  Facility/Department: [facility] 4A STEPDOWN  Daily Treatment Note  NAME: Efrem Chauhan  : 1935  MRN: [id number]    Date of Service: 2020    Discharge Recommendations:  Patient would benefit from continued therapy after discharge      Assessment   Body structures, Functions, Activity limitations: Decreased functional mobility ; Decreased endurance;Decreased balance;Decreased strength;Decreased posture;Decreased ADL status; Increased pain  Assessment: Pt with significantly impaired mobility due to balance deficits and L sided weakness. Increased fatigue noted today with decreased activity tolerance. Pt will require continued PT upon discharge. Prognosis: Fair  PT Education: General Safety; Functional Mobility Training;Home Exercise Program;Transfer Training  REQUIRES PT FOLLOW UP: Yes  Activity Tolerance  Activity Tolerance: Patient limited by fatigue;Patient limited by pain     Patient Diagnosis(es): The encounter diagnosis was Cerebrovascular accident (CVA), unspecified mechanism ([facility] [address] has a past medical history of BPH (benign prostatic hyperplasia), Cancer ([facility] [address] Cervical spondylolysis, Depression, Hyperlipidemia, Hypertension, Neck pain, Neuropathy, Orofacial dyskinesia, Osteoarthritis, and Parkinson's disease ([facility] [address] has a past surgical history that includes joint replacement. Restrictions  Restrictions/Precautions  Restrictions/Precautions: Fall Risk, General Precautions  Required Braces or Orthoses?: Yes  Required Braces or Orthoses  Cervical: c-collar  Position Activity Restriction  Other position/activity restrictions: Up w/assist; SBP < 185; per NS note \"CTLS recommendations: clear prior to my exam.\" Pt ok for therapy with no restrictions per Dr. Tracee Abreu with NS  Subjective   General  Chart Reviewed: Yes  Response To Previous Treatment: Patient with no complaints from previous session.   Family / Caregiver Present: No  Subjective  Subjective: Pt in bed; reports no change since yesterday. Reports 4-5/10 neck pain. General Comment  Comments: Pt assisted to chair with milton steady. Left with CAMPOS present in room for completion of ADLs  Pain Screening  Patient Currently in Pain: Yes  Pain Assessment  Pain Level: 5  Pain Type: Acute pain  Pain Location: Neck  Pain Descriptors: Aching  Vital Signs  Patient Currently in Pain: Yes       Orientation  Orientation  Overall Orientation Status: Within Functional Limits  Cognition      Objective   Bed mobility  Supine to Sit: Moderate assistance;2 Person assistance(had difficulty advancing LEs requiring assistance; modAx2 for upper trunk)  Scooting: Maximal assistance  Transfers  Sit to Stand: Maximum Assistance;2 Person Assistance(completed with milton steady from elevated bed; increased difficulty completing today)  Stand to sit: Minimal Assistance;2 Person Assistance  Bed to Chair: Dependent/Total(milton steady)  Ambulation  Ambulation?: No     Balance  Posture: Fair  Sitting - Static: Fair(required Bret/CGA to maintain balance today; sat EOB ~5\")  Sitting - Dynamic: Poor;+  Standing - Static: Poor;+(pt stood for 1-2 minutes at [place] steady with modAx2; demos flexed posture and flexed knees)       Exercises:  Seated LE exercise program: Long Arc Quads, hip abduction/adduction, heel/toe raises, and marches. Reps: 10x each, AAROM for LLE  PROM LUE x 10 reps within all planes of motion    Goals  Short term goals  Time Frame for Short term goals: 14  Short term goal 1: Pt to perform bed mobility Bret  Short term goal 2: Demonstrate sit to stand transfer Bret  Short term goal 3: Ambulate 100ft w/ RW Bret  Short term goal 4: Tolerate 30 minutes of therapy to demo increased endurance  Patient Goals   Patient goals :  To go home    Plan    Plan  Times per week: 5-6x/week  Current Treatment Recommendations: Strengthening, Transfer Training, Endurance Training, Patient/Caregiver Education & Training, ROM, Balance Training, Gait Training, Home Exercise Program, Functional Mobility Training, Stair training, Safety Education & Training  Safety Devices  Type of devices:  All fall risk precautions in place, Left in chair, Call light within reach, Gait belt, Patient at risk for falls, Nurse notified  Restraints  Initially in place: No     Therapy Time   Individual Concurrent Group Co-treatment   Time In 0825         Time Out 0853         Minutes 28         Timed Code Treatment Minutes: 8 Minutes(co-treat with CAMPOS due to level of assistance required)       Loli Izaguirre, PTA

## 2020-08-01 NOTE — PROGRESS NOTES
11:44 am COMPARISON: None. HISTORY: ORDERING SYSTEM PROVIDED HISTORY: hand pain TECHNOLOGIST PROVIDED HISTORY: hand pain Reason for Exam: hand pain Acuity: Unknown FINDINGS: Paralyzed osseous structure triscaphe and thumb CMC degenerative change. Degenerative changes of the 1st MCP and IP joint. Degenerative changes of the 3rd PIP joint. hook osteophytes of the 2nd 3rd metacarpals. Degenerative changes of the hand. Xr Foot Left (min 3 Views)    Result Date: 7/30/2020  EXAMINATION: THREE XRAY VIEWS OF THE LEFT FOOT 7/30/2020 11:44 am COMPARISON: None HISTORY: ORDERING SYSTEM PROVIDED HISTORY: foot pain TECHNOLOGIST PROVIDED HISTORY: foot pain Reason for Exam: foot pain Acuity: Unknown Acute left foot pain. Initial encounter. FINDINGS: Moderate 1st MTP degenerative changes. Moderate midfoot degenerative changes. Lisfranc alignment is maintained. Hammertoe deformity of the 2nd digit. 1. No acute radiographic finding to account for patient's left foot pain. 2. Moderate 1st MTP degenerative changes. 3. Moderate midfoot degenerative changes. 4. Hammertoe deformity of the 2nd digit. Ct Head Wo Contrast    Result Date: 7/30/2020  EXAMINATION: CT OF THE HEAD WITHOUT CONTRAST,  7/29/2020 1:40 pm TECHNIQUE: CT of the head was performed without the administration of intravenous contrast. Dose modulation, iterative reconstruction, and/or weight based adjustment of the mA/kV was utilized to reduce the radiation dose to as low as reasonably achievable. COMPARISON: None HISTORY: ORDERING SYSTEM PROVIDED HISTORY: Stroke, L sided weakness. LKW 1210 pm TECHNOLOGIST PROVIDED HISTORY: Stroke, L sided weakness. LKW 1210 pm Reason for Exam: Stroke, L sided weakness. LKW 1210 pm Acuity: Unknown Type of Exam: Unknown Initial evaluation FINDINGS: BRAIN/VENTRICLES: There is mild cerebral atrophy. There is atherosclerotic calcification of the cavernous carotid arteries.  There are areas of hypoattenuation in the periventricular white matter and centrum semiovale that are likely related to chronic small vessel ischemic disease. There is a focal area of hypoattenuation in the left frontal lobe that likely represents an infarct of indeterminate age. There is no midline shift or mass effect. There is a subtle questionable area of hyperdensity in one of the sulci over the high right parietal region. While this may be artifact, very subtle subarachnoid hemorrhage cannot be excluded. ORBITS: The visualized portion of the orbits demonstrate no acute abnormality. SINUSES:  The visualized paranasal sinuses and mastoid air cells are clear. SOFT TISSUES/SKULL:  No acute abnormality of the visualized skull or soft tissues. Cerebral atrophy. Chronic small vessel ischemic changes. Area of hypoattenuation in the left frontal periventricular white matter which likely represents an infarct of indeterminate age. Questionable subtle hyperattenuation over the right frontal convexity which is suspicious for a tiny subarachnoid hemorrhage. Report was called and discussed with Dr. Levi Back, 07/29/2020 at 2:16 p.m. Ct Cervical Spine Wo Contrast    Result Date: 7/29/2020  EXAMINATION: CT OF THE CERVICAL SPINE WITHOUT CONTRAST 7/29/2020 7:05 pm TECHNIQUE: CT of the cervical spine was performed without the administration of intravenous contrast. Multiplanar reformatted images are provided for review. Dose modulation, iterative reconstruction, and/or weight based adjustment of the mA/kV was utilized to reduce the radiation dose to as low as reasonably achievable. COMPARISON: None HISTORY: ORDERING SYSTEM PROVIDED HISTORY: trauma TECHNOLOGIST PROVIDED HISTORY: trauma Reason for Exam: fall Acuity: Acute Type of Exam: Initial FINDINGS: No acute fracture. No subluxation. No prevertebral soft tissue swelling. Severe multilevel degenerative changes. Abnormal C1-C2 positioning with widening on the left.      Abnormal C1-C2 relationship which may be related to positioning. If there is concern for ligamentous injury, MRI is recommended. No acute fracture. Severe multilevel degenerative changes. Ct Thoracic Spine Wo Contrast    Result Date: 7/29/2020  EXAMINATION: CT OF THE THORACIC SPINE WITHOUT CONTRAST; CT OF THE LUMBAR SPINE WITHOUT CONTRAST 7/29/2020 TECHNIQUE: CT of the thoracic spine was performed without the administration of intravenous contrast. Multiplanar reformatted images are provided for review. Dose modulation, iterative reconstruction, and/or weight based adjustment of the mA/kV was utilized to reduce the radiation dose to as low as reasonably achievable.; CT of the lumbar spine was performed without the administration of intravenous contrast. Multiplanar reformatted images are provided for review. Dose modulation, iterative reconstruction, and/or weight based adjustment of the mA/kV was utilized to reduce the radiation dose to as low as reasonably achievable. COMPARISON: CT chest abdomen pelvis 07/29/2020 HISTORY: ORDERING SYSTEM PROVIDED HISTORY: trauma TECHNOLOGIST PROVIDED HISTORY: trauma Reason for Exam: fall Acuity: Acute Type of Exam: Initial; ORDERING SYSTEM PROVIDED HISTORY: TRAUMA TECHNOLOGIST PROVIDED HISTORY: trauma Reason for Exam: fall Acuity: Acute Type of Exam: Initial FINDINGS: BONES/ALIGNMENT: There is no acute fracture or traumatic malalignment. Central plate depression W1-A5 appears chronic. DEGENERATIVE CHANGES: Severe multilevel degenerate changes. Multilevel ankylosis identified. Slight widening of the disc spaces identified at T5-T6 and T12-L1 likely degenerative severe multilevel disc space disease involving the lumbar spine with multilevel vacuum disc phenomena. SOFT TISSUES: No paraspinal mass is seen. Moderate severe multilevel degenerate changes of the thoracic spine and severe multilevel degenerate changes of the lumbar spine.  No convincing evidence acute fracture traumatic malalignment     Ct Lumbar Spine cervical spine was performed without the administration of intravenous contrast. COMPARISON: CT cervical spine July 29, 2020 HISTORY: ORDERING SYSTEM PROVIDED HISTORY: possible unstable spine TECHNOLOGIST PROVIDED HISTORY: possible unstable spine Reason for Exam: possible unstable spine FINDINGS: BONES/ALIGNMENT: There is straightening of normal cervical lordosis. There is partial osseous fusion at the posterior aspects of C2-3 and C3-4. There are mild chronic compression deformities at the superior endplates of T1 and T2. The cervical vertebral body heights are grossly maintained, without acute fracture or destructive osseous lesion. There is 2 mm C4 on C5 anterolisthesis, 1-2 mm C6 on C7 anterolisthesis. There is degenerative disc disease with disc desiccation, disc space narrowing and endplate changes. 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 SPINAL CORD: There is increased T2 signal in the cervical spinal cord at C1-2 level, likely related to spinal cord edema. 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 SOFT TISSUES: There is increased T2 signal in the asymmetrically widened right lateral atlanto-dens interval, nonspecific. Injury of the alar ligaments cannot be excluded. C1-2: There is moderate narrowing of the spinal canal with mild spinal cord compression. C2-C3: There is no significant disc herniation. There is mild spinal canal narrowing, moderate left foraminal narrowing. There is mild bilateral facet arthrosis with fusion of the bilateral facet joints. C3-C4: There is no significant disc herniation. There is mild spinal canal narrowing, moderate bilateral foraminal narrowing. There is moderate left facet arthrosis with fusion of the facet joint.  C4-C5: There is anterolisthesis, disc bulge, severe left and mild right facet arthrosis, with mild-to-moderate spinal canal narrowing, mild spinal cord compression, moderate to severe bilateral foraminal narrowing. C5-C6: There is disc bulge, severe left and mild right facet arthrosis, with minimal spinal canal narrowing, moderate to severe bilateral foraminal narrowing. C6-C7: There is disc bulge, moderate left and mild right facet arthrosis, with mild spinal canal narrowing, and moderate bilateral foraminal narrowing. C7-T1: There is no significant disc protrusion, spinal canal stenosis or neural foraminal narrowing. Increased T2 signal in the cervical spinal cord at C1-2 level, likely related to spinal cord edema. Increased T2 signal in the asymmetrically widened right lateral atlanto-dens interval, nonspecific. Injury of the alar ligaments cannot be excluded. Degenerative disc disease as described above, exacerbating congenitally narrow cervical spinal canal. Spinal canal narrowing, moderate at C1-2 with mild spinal cord compression, mild to moderate at C4-5 with mild spinal cord compression, mild at C2-3, C3-4 and C6-7, minimal at C5-6. Foraminal narrowing, moderate to severe at bilateral C4-5 and bilateral C5-6, moderate at left C2-3, bilateral C3-4 and bilateral C6-7. 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 Xr Shoulder Left (min 2 Views)    Result Date: 7/30/2020  EXAMINATION: TWO XRAY VIEWS OF THE LEFT SHOULDER 7/30/2020 11:44 am COMPARISON: None HISTORY: ORDERING SYSTEM PROVIDED HISTORY: shoulder pain TECHNOLOGIST PROVIDED HISTORY: shoulder pain Reason for Exam: shoulder pain Acuity: Unknown Acute left shoulder pain. FINDINGS: The humeral head aligns normally with the glenoid fossae. Coracoclavicular and acromioclavicular interval are within normal range. No visible fracture. No dislocation. There is a calcified granuloma in the left upper lobe. 1. No acute radiographic finding to account for patient's left shoulder pain.      Xr Chest Portable    Result Date: 7/29/2020  EXAMINATION: ONE XRAY VIEW OF THE CHEST 7/29/2020 3:56 pm COMPARISON: None. HISTORY: ORDERING SYSTEM PROVIDED HISTORY: pCO2 high, no SOB TECHNOLOGIST PROVIDED HISTORY: pCO2 high, no SOB FINDINGS: No focal consolidation. Mild cardiomegaly. No pulmonary edema. Calcified granuloma. No acute findings. Cta Head Neck W Contrast    Result Date: 7/30/2020  EXAMINATION: CTA OF THE HEAD AND NECK WITH CONTRAST 7/29/2020 1:40 pm: TECHNIQUE: CTA of the head and neck was performed with the administration of intravenous contrast. Multiplanar reformatted images are provided for review. MIP images are provided for review. Stenosis of the internal carotid arteries measured using NASCET criteria. Dose modulation, iterative reconstruction, and/or weight based adjustment of the mA/kV was utilized to reduce the radiation dose to as low as reasonably achievable. COMPARISON: None. HISTORY: ORDERING SYSTEM PROVIDED HISTORY: stroke symptoms, L sided weakness, LKW 1210pm TECHNOLOGIST PROVIDED HISTORY: stroke symptoms, L sided weakness, LKW 1210pm Reason for Exam: stroke, L sided weakness. LKW 1210pm Acuity: Unknown Type of Exam: Unknown Initial evaluation. FINDINGS: CTA NECK: AORTIC ARCH/ARCH VESSELS: No significant abnormality of the aortic arch is identified. Incidental note is made of the left vertebral artery originating directly from the aortic arch which is a normal anatomic variant. CAROTID ARTERIES: The common carotid arteries are patent bilaterally. There is minimal atherosclerotic disease in the mid common carotid arteries bilaterally without any significant narrowing. There is atherosclerotic calcification and approximately 50-60% narrowing of the left internal carotid artery just distal to the bulb. There is atherosclerotic disease in the proximal bulb with approximately 30% narrowing of the vessel. The left internal carotid artery is then patent through the skull base.  There is atherosclerotic calcification in the region of the right internal carotid artery bulb with approximately 20% stenosis of the vessel. The right internal carotid artery is then patent through the skull base. VERTEBRAL ARTERIES: The right vertebral artery is dominant and patent in its visualized course. The left vertebral artery originates directly from the aortic arch and is patent in its visualized course. SOFT TISSUES: There is no acute abnormality of the visualized soft tissues. BONES: There are mild degenerative changes of the cervical spine. CTA HEAD: ANTERIOR CIRCULATION:  No abnormality of the internal carotid arteries, middle cerebral arteries, or anterior cerebral arteries are identified. POSTERIOR CIRCULATION:  No abnormality of the distal vertebral arteries, basilar artery, or posterior cerebral arteries are identified. Incidental note is made of fetal origin of the right posterior cerebral artery which is a normal anatomic variant. No obvious aneurysms are identified. OTHER: No dural venous sinus thrombosis on this non-dedicated study. Approximately 30% stenosis of the left internal carotid artery in the proximal left carotid bulb and approximately 50-60% stenosis of the left internal carotid artery just distal to the bulb. Atherosclerotic calcification of the right internal carotid artery with approximately 20% stenosis in the region of the bulb. The left vertebral artery originates directly from the aortic arch which is a normal anatomic variant. No significant abnormality of the intracranial circulation. Incidental note is made of fetal origin of the right posterior cerebral artery which is a normal anatomic variant. Ct Chest Abdomen Pelvis W Contrast    Result Date: 7/30/2020  EXAMINATION: CT OF THE CHEST, ABDOMEN, AND PELVIS WITH CONTRAST 7/29/2020 7:05 pm TECHNIQUE: CT of the chest, abdomen and pelvis was performed with the administration of intravenous contrast. Multiplanar reformatted images are provided for review.  Dose modulation, iterative reconstruction, and/or weight based adjustment of the mA/kV was utilized to reduce the radiation dose to as low as reasonably achievable. COMPARISON: None. HISTORY: ORDERING SYSTEM PROVIDED HISTORY: trauma TECHNOLOGIST PROVIDED HISTORY: trauma Reason for Exam: fall Acuity: Acute Type of Exam: Initial Patient fell hitting head when opening front door. FINDINGS: Chest: Mediastinum: No mediastinal adenopathy or acute aortic abnormality. Calcification aortic arch is noted. Mild cardiomegaly. Coronary artery calcification. No pericardial effusion. Lungs/pleura: Mild emphysematous changes. No effusion, focal consolidation, or extrapleural air. Tracheobronchial tree is patent. Left upper lobe granuloma. Soft Tissues/Bones: Non-acute appearing fracture C7 spinous process versus soft tissue ligamentous calcification. No acute appearing osseous abnormality. No axillary adenopathy or acute soft tissue abnormality. Abdomen/Pelvis: Organs: Liver, gallbladder, pancreas, spleen, and adrenals are unremarkable. Kidneys contain multiple bilateral cysts largest in the right kidney of 5 cm and in the left kidney of 3.5 cm. No urinary obstruction. GI/Bowel: No free fluid, free air, bowel obstruction or bowel wall thickening. Increased colonic stool load. Pelvis: Bladder is intact. No abnormal fluid collections, pelvic or inguinal adenopathy. Bilateral fat containing inguinal hernias are noted without strangulation. Prostate is mildly enlarged. Peritoneum/Retroperitoneum:   No acute aortic abnormality; no aneurysm. No retroperitoneal or mesenteric adenopathy. Bones/Soft Tissues: Degenerative changes are present in the hips and lower lumbar facets. Multilevel degenerative and degenerative disc changes are noted. CT chest: Emphysematous changes. No extrapleural air, effusion or acute pulmonary findings. Atherosclerotic disease. CT abdomen and pelvis: No solid organ injury. No acute abdominopelvic process. Osseous findings as above. Mri Limited Brain    Result Date: 7/30/2020  EXAMINATION: MRI OF THE BRAIN WITHOUT CONTRAST  7/29/2020 3:00 pm TECHNIQUE: Multiplanar multisequence MRI of the brain was performed without the administration of intravenous contrast. COMPARISON: CT head 01/29/2020 HISTORY: ORDERING SYSTEM PROVIDED HISTORY: add GRE. [employer] for stroke TECHNOLOGIST PROVIDED HISTORY: add GRE. [employer] for stroke Initial evaluation FINDINGS: INTRACRANIAL STRUCTURES/VENTRICLES: There is a punctate area of restricted diffusion measuring 3 mm medially in the posterior left frontal lobe along the falx that is suggestive of an acute infarct or subacute infarct. There is high signal in some of the sulci over the high right parietal lobe with corresponding low signal on the gradient echo images suggestive of subarachnoid hemorrhage. There is subarachnoid hemorrhage in the pre frontal sulcus. There is diffuse cerebral atrophy. There are chronic small vessel ischemic changes. There are several punctate areas of low signal scattered throughout the brain on the gradient echo images suggestive of previous areas of microhemorrhage. The findings were discussed with Dr. Ira Sullivan, 07/29/2020 at 3:18 p.m. Punctate 3 mm acute to subacute infarct in the posterior right frontal lobe adjacent to the falx. No other areas of restricted diffusion. There is subarachnoid hemorrhage over the high right parietal lobe and posterior right frontal lobe. Cerebral atrophy. Chronic small vessel ischemic changes.        Assessment & Differential Dx:      Primary Problem  <principal problem not specified>    Active Hospital Problems    Diagnosis Date Noted    Cerebrovascular accident (CVA) [facility]) [I63.9]     Acute cerebral infarction ([address] [I63.9] 07/30/2020    Left hemiparesis ([address] [G81.94] 07/30/2020    Parkinson disease ([address] Iban White 07/30/2020    Subarachnoid hemorrhage ([address] [I60.9] 07/29/2020       Case of 41-year-old WM with a fall story along with left arm and left leg weakness, Hx of Parkinson with baseline gait apraxia on Sinemet 25/100 twice daily, symmetrel 100 po BID and mysoline 100 mg BID, reporting arthritic right knee to have upcoming knee replacement,  -He hit his head to the wall, no LOC,  -Head CT with right parietal frontal cortical subarachnoid hemorrhage  With possible small acute right parietal infarction with bilateral chronic periventricular small vessel ischemia  -Head cervical spine: Abnormal C1-2 positioning  -CT thoracic spine:  Moderate degenerative changes  -CT lumbar spine: Severe degenerative changes  -CTA head and neck: Left ICA 50-60% stenosis, right ICA 20% stenosis  -MRI head: Right parietal frontal cortical subarachnoid hemorrhage with acute right high parietal infarction with bilateral chronic periventricular small vessel ischemia      Impression:  -Fall with a posttraumatic subarachnoid hemorrhage  -right cerebral infarction left hemiparesis  -Parkinson disease  -unstable cervical spine  -possible underlying dementia    Plan:     -MRI brain W0: Punctuate 3 mm acute to subacute infarct in the posterior right frontal lobe adjacent to the falx, there is subarachnoid hemorrhage over the high right parietal lobe and posterior right frontal lobe, cerebral atrophy  -No TPA due to traumatic subarachnoid hemorrhage  -Trauma surgery and neurosurgery consulted: No neurosurgery intervention needed,  -MRI cervical spine W 0 contrast: Increased T2 signal in the cervical spine cord at C1-2 level likely 2 spinal cord edema, increased 3 to spinal in the soft symmetry widened right lateral atlanto dens, degenerative disc disease, spinal canal narrowing, moderate at C1-2 with mild spinal cord compression  -TTE with bubble study: Negative   -Folic acid 1 mg twice daily  -Aspirin 81 mg  -continue atorvastatin 20 mg nightly  -Hold all antiplatelets and anticoagulants due SAH  -Cervical collar- as per trauma/nerosurgery   -PT/OT/speech  -Cardiology

## 2020-08-01 NOTE — PROGRESS NOTES
Occupational Therapy  Facility/Department: [address] STEPDOWN  Daily Treatment Note  NAME: Farnaz Willis  : 1935  MRN: [id number]    Date of Service: 2020    Discharge Recommendations:  Patient would benefit from continued therapy after discharge. Pt unsafe to return to residence sec to decreased balance, activity tolerance, ROM and safety. Pt stood this day discussed with OTR for goals. Assessment   Performance deficits / Impairments: Decreased functional mobility ; Decreased safe awareness;Decreased balance;Decreased ADL status; Decreased coordination;Decreased cognition;Decreased posture;Decreased high-level IADLs;Decreased ROM; Decreased endurance;Decreased strength;Decreased fine motor control  Prognosis: Fair  OT Education: OT Role;Plan of Care;Transfer Training  Patient Education: purpose of activity; proper hand and foot placement; proper posture; ROM activities  Barriers to Learning: pt demo P carry over req increased assist and verbal instructions  REQUIRES OT FOLLOW UP: Yes  Activity Tolerance  Activity Tolerance: Patient limited by fatigue  Safety Devices  Safety Devices in place: Yes  Type of devices: Patient at risk for falls; Left in bed;Nurse notified;Call light within reach; Bed alarm in place  Restraints  Initially in place:No      Patient Diagnosis(es): The encounter diagnosis was Cerebrovascular accident (CVA), unspecified mechanism ([address] has a past medical history of BPH (benign prostatic hyperplasia), Cancer ([address] Cervical spondylolysis, Depression, Hyperlipidemia, Hypertension, Neck pain, Neuropathy, Orofacial dyskinesia, Osteoarthritis, and Parkinson's disease ([address] has a past surgical history that includes joint replacement.     Restrictions  Restrictions/Precautions  Restrictions/Precautions: Fall Risk  Required Braces or Orthoses?: Yes  Required Braces or Orthoses  Cervical: c-collar  Position Activity Restriction  Other position/activity restrictions: up with assist  Subjective   General  Patient assessed for rehabilitation services?: Yes  Family / Caregiver Present: No  General Comment  Comments: RN and pt agreeable to therapy this day  Pain Assessment  Pain Assessment: 0-10  Pain Level: 5  Pain Type: Acute pain  Pain Location: Neck;Head  Non-Pharmaceutical Pain Intervention(s): Distraction;Repositioned; Therapeutic presence  Vital Signs  Patient Currently in Pain: Yes   Orientation  Orientation  Overall Orientation Status: Within Functional Limits  Objective    ADL  Grooming: Moderate assistance;Setup;Verbal cueing; Increased time to complete(oral care completed seated in chair)  Additional Comments: pt req increased time with oral care sec to increased fatigue and weakness. Pt req assist with utensils and incorporating LUE for activity. Pt req Kaw assist with RUE and increased verbal instruction for paricipation. BUE reaching activity incorporated in order to increase strength and participation with ADLs. LUE utilized to grasp 3/5 objects at various planes req Kaw assist and increased verbal instruction for visual scanning and ROM. RUE utilized to obtain 5/5 objects from various planes req verbal instruction on visual scanning and sequencing. Balance  Sitting Balance: Minimal assistance(increasing to CGA)  Standing Balance: Moderate assistance(Mod A x2 to maintain standing balance)  Standing Balance  Time: pt tolerated approx 2-3 min  Activity: static and dynamic standing  Comment: utilizing SS increrased weakness and decreased activity tolerance noted  Functional Mobility  Functional Mobility Comments: not appropriate this day  Bed mobility  Supine to Sit: Moderate assistance;2 Person assistance  Scooting:  Moderate assistance;2 Person assistance  Transfers  Sit to stand: Maximum assistance;2 Person assistance  Stand to sit: Maximum assistance;2 Person assistance  Transfer Comments: utilizing SS  Cognition  Overall Cognitive Status: Exceptions  Arousal/Alertness: Delayed responses to stimuli  Following Commands: Follows one step commands with repetition; Follows one step commands with increased time; Inconsistently follows commands  Attention Span: Difficulty attending to directions  Safety Judgement: Decreased awareness of need for assistance;Decreased awareness of need for safety  Problem Solving: Decreased awareness of errors;Assistance required to identify errors made;Assistance required to generate solutions;Assistance required to implement solutions;Assistance required to correct errors made  Insights: Decreased awareness of deficits  Initiation: Requires cues for all  Sequencing: Requires cues for some     Pt and RN agreeable to therapy this day. Co tx compelted with PTA sec to pt req increased assist. ADL tasks of grooming compelted see above for LOF. Strong L lean noted pt req increased wedging in chair to maintain posture and increased verbal instruction on proper posture throughout session. At session end pt seated in chair with call light in reach, chair alarm on and RN present.    Plan   Plan  Times per week: 3-5 x/wk  Current Treatment Recommendations: Balance Training, Functional Mobility Training, Endurance Training, Strengthening, ROM, Neuromuscular Re-education, Cognitive Reorientation, Pain Management, Safety Education & Training, Equipment Evaluation, Education, & procurement, Self-Care / ADL, Patient/Caregiver Education & Training   Con Juan C's    Goals  Short term goals  Time Frame for Short term goals: By discharge, pt will:  Short term goal 1: Demo Min A for bed mobility to increase safety and independence with ADLs  Short term goal 2: Demo Min A for UB ADLs and grooming tasks with setup and Min VCs  Short term goal 3: Demo Min A for LB ADLs and toileting tasks with setup and Min VCs  Short term goal 4: Demo 10+ minutes dynamic sitting task to increase independence with ADLs/IADLs  Short term goal 5: Demo AAROM to LUE to increase functional use for ADL/IADL performance  Short term goal 6: NOTIFY OTR TO UPDATE GOALS AS PT PROGRESSES       Therapy Time   Individual Concurrent Group Co-treatment   Time In 0830         Time Out 0909         Minutes 39         Timed Code Treatment Minutes: 31 Minutes  Variance: Kathy Smith 1213, ANDRES/SLOANE

## 2020-08-01 NOTE — PROGRESS NOTES
Neurosurgery ARYA/Resident    Daily Progress Note   Chief Complaint   Patient presents with    Altered Mental Status     Last known well 1210     2020  9:13 AM    Chart reviewed. No acute events overnight. No new complaints. Wearing collar sitting upright in bedside chair. Vitals:    20 2100 20 0026 20 0433 20 0845   BP: (!) 150/84 (!) 147/80 (!) 163/84 124/87   Pulse: 62 66 66 71   Resp: 19 17 16 16   Temp: 97.5 °F (36.4 °C) 98.3 °F (36.8 °C) 98.9 °F (37.2 °C) 98.5 °F (36.9 °C)   TempSrc: Oral Oral Oral Oral   SpO2: 98% 97%  98%   Weight:       Height:         PE:   AOx3   CNII-XII intact   PERRL, EOMI   Motor   L deltoid 2-/5; R deltoid 5/5  L biceps 2-/5; R biceps 5/5  L triceps 2-/5; R triceps 5/5  L intrinsics 2-/5; R intrinsics 5/5      L iliopsoas 1/5 , R iliopsoas 5/5  L quadriceps 1/5; R quadriceps 5/5  L Dorsiflexion 3/5; R dorsiflexion 5/5  L Plantarflexion 4/5; R plantarflexion 5/5  L EHL 4/5; R EHL 5/5    Sensation intact       Lab Results   Component Value Date    WBC 8.7 2020    HGB 14.0 2020    HCT 43.0 2020     2020    CHOL 146 2020    TRIG 48 2020    HDL 65 2020     (L) 2020    K 3.6 (L) 2020    CL 93 (L) 2020    CREATININE 0.49 (L) 2020    BUN 12 2020    CO2 22 2020    TSH 1.44 2020    INR 1.0 2020    LABA1C 5.2 2020       A/P  81 y.o. male who presents after fall with LUE weakness > LLE weakness and evidence of C1-C2 cord compression and contusion and stenosis at C4-C5     - Continue MAP goal >85   - Cardiology surgical clearance with a class II risk   - Tentative plan for C1-2 and C4-5 laminectomy and fusion this week, to be discussed with additional family members today      Please contact neurosurgery with any changes in patients neurologic status.        SHANIKA Mao   9:13 AM EDT      Neurosurgery attendin2020    I have reviewed the chart, studied the images, and examined the patient personally and agree with the ARYA/Resident except with following addendum:     The patient is a 81 y. o. male who presents after fall with left upper more then lower extremity weakness with MRI evidence of C1/2 cord compression and contusion, there is also stenosis at less degree C4/5. There Is autofusion of C2-4 with C4/5 degenerative spondylolisthesis     On exam left his left hand intrinsic 3/5, finger extension 3/5, triceps 4/5, bicep 2-3/5, deltoid 2/5    Lower extremities right 5/5, left 4/5, left babinski positive     Slightly worsened neurological exam today     I spoke to patient and family at length about diagnosis and treatment. The best chance for maximum recovering is surgical decompression. Alternatively he could try rehabilitation and have surgery later at the risk of progressive spinal cord dysfunction. I recommended cervical laminectomy C1/2, and C4/5 with C4/5 instrumented  fusion with with goal to stabilization the patiennt's spinal cord function and prevent further injury in the future. I explained to the patient  the indications for surgery, the expected outcomes, the alternatives surgery which include no surgery, delayed surgery, vs anterior surgery. The risk includes bleeding, pain, infection, CSF leak, cord/nerve damage, worsening symptoms,stroke, hardware malfunction, need for more surgery, anesthesia and medical complications, death. All questions were answered and I was asked to proceed with surgery. Plan on surgery first thing tomorrow 7:30    ANNIE Haywood DO  Neurosurgeon

## 2020-08-01 NOTE — CONSULTS
tablets at 2:30PM and 6:30PM   Yes Historical Provider, MD   carbidopa-levodopa (SINEMET CR)  MG per extended release tablet Take 1 tablet by mouth 4 times daily    Yes Historical Provider, MD   carbidopa (LODOSYN) 25 MG TABS tablet Take by mouth 3 at 7, 3 at 11, 2 4 pm 2 at 7   Yes Historical Provider, MD   citalopram (CELEXA) 20 MG tablet Take 20 mg by mouth daily   Yes Historical Provider, MD   propranolol (INDERAL LA) 60 MG extended release capsule Take 60 mg by mouth 2 times daily    Yes Historical Provider, MD   finasteride (PROSCAR) 5 MG tablet Take 5 mg by mouth daily   Yes Historical Provider, MD   docusate sodium (COLACE) 100 MG capsule Take 100 mg by mouth 2 times daily   Yes Historical Provider, MD   primidone (MYSOLINE) 50 MG tablet Take 100 mg by mouth 2 times daily   Yes Historical Provider, MD   amantadine (SYMMETREL) 100 MG capsule Take 100 mg by mouth daily    Yes Historical Provider, MD   doxazosin (CARDURA) 2 MG tablet Take 2 mg by mouth nightly   Yes Historical Provider, MD   propranolol (INDERAL) 40 MG tablet Take 40 mg by mouth 2 times daily 60+40 BID   Yes Historical Provider, MD   atorvastatin (LIPITOR) 10 MG tablet Take 10 mg by mouth every other day    Yes Historical Provider, MD   ibuprofen (ADVIL;MOTRIN) 600 MG tablet Take 600 mg by mouth 2 times daily   Yes Historical Provider, MD      Current Facility-Administered Medications: sodium chloride tablet 1 g, 1 g, Oral, BID WC  carbidopa-levodopa (SINEMET CR)  MG per extended release tablet 1 tablet, 1 tablet, Oral, 4x Daily  carbidopa-levodopa (SINEMET)  MG per tablet 3 tablet, 3 tablet, Oral, BID  carbidopa-levodopa (SINEMET)  MG per tablet 2 tablet, 2 tablet, Oral, BID  doxazosin (CARDURA) tablet 2 mg, 2 mg, Oral, Nightly  propranolol (INDERAL LA) extended release capsule 60 mg, 60 mg, Oral, BID  propranolol (INDERAL) tablet 40 mg, 40 mg, Oral, BID  docusate sodium (COLACE) capsule 100 mg, 100 mg, Oral, Daily  polyethylene glycol (GLYCOLAX) packet 17 g, 17 g, Oral, Daily  finasteride (PROSCAR) tablet 5 mg, 5 mg, Oral, Daily  ibuprofen (ADVIL;MOTRIN) tablet 800 mg, 800 mg, Oral, Q8H PRN  butalbital-acetaminophen-caffeine (FIORICET, ESGIC) per tablet 1 tablet, 1 tablet, Oral, TID PRN  citalopram (CELEXA) tablet 20 mg, 20 mg, Oral, Daily  primidone (MYSOLINE) tablet 50 mg, 50 mg, Oral, BID  amantadine (SYMMETREL) capsule 100 mg, 100 mg, Oral, BID  labetalol (NORMODYNE;TRANDATE) injection syringe 10 mg, 10 mg, Intravenous, Q4H PRN  atorvastatin (LIPITOR) tablet 20 mg, 20 mg, Oral, Nightly  sodium chloride flush 0.9 % injection 10 mL, 10 mL, Intravenous, 2 times per day  sodium chloride flush 0.9 % injection 10 mL, 10 mL, Intravenous, PRN  acetaminophen (TYLENOL) tablet 1,000 mg, 1,000 mg, Oral, 3 times per day  methocarbamol (ROBAXIN) tablet 750 mg, 750 mg, Oral, TID  senna (SENOKOT) tablet 8.6 mg, 1 tablet, Oral, Daily  ondansetron (ZOFRAN) injection 4 mg, 4 mg, Intravenous, Q6H PRN    Allergies:  Patient has no known allergies. Social History:   reports that he has quit smoking. He has never used smokeless tobacco.     Family History: family history is not on file. No h/o sudden cardiac death. No for premature CAD    REVIEW OF SYSTEMS:    · Constitutional: there has been no unanticipated weight loss. There's been No change in energy level, No change in activity level. · Eyes: No visual changes or diplopia. No scleral icterus. · ENT: No Headaches  · Cardiovascular: No cardiac history - Specifically denies MI or CHF. · Respiratory: No previous pulmonary problems, No cough  · Gastrointestinal: No abdominal pain. No change in bowel or bladder habits. · Genitourinary: No dysuria, trouble voiding, or hematuria. · Musculoskeletal:  No gait disturbance, No joint complaints. Complains of left sided weakness. · Integumentary: No rash or pruritis.   · Neurological: No headache, diplopia, change in muscle strength, numbness or tingling. No change in gait, balance, coordination, mood, affect, memory, mentation, behavior. · Psychiatric: No anxiety, or depression. · Endocrine: No temperature intolerance. No excessive thirst, fluid intake, or urination. No tremor. · Hematologic/Lymphatic: No abnormal bruising or bleeding, blood clots or swollen lymph nodes. · Allergic/Immunologic: No nasal congestion or hives. PHYSICAL EXAM:      BP (!) 163/84   Pulse 66   Temp 98.9 °F (37.2 °C) (Oral)   Resp 16   Ht 5' 6\" (1.676 m)   Wt 162 lb (73.5 kg)   SpO2 97%   BMI 26.15 kg/m²    Constitutional and General Appearance: alert, cooperative, no distress and appears stated age  HEENT: PERRL, no cervical lymphadenopathy. No masses palpable. Normal oral mucosa  Respiratory:  · Normal excursion and expansion without use of accessory muscles  · Resp Auscultation: Good respiratory effort. No for increased work of breathing. On auscultation: clear to auscultation bilaterally  Cardiovascular:  · The apical impulse is not displaced  · Heart tones are crisp and normal. regular S1 and S2.  · Jugular venous pulsation Normal  · The carotid upstroke is normal in amplitude and contour without delay or bruit  · Peripheral pulses are symmetrical and full   Abdomen:   · No masses or tenderness  · Bowel sounds present  Extremities:  ·  No Cyanosis or Clubbing  ·  Lower extremity edema: No  ·  Skin: Warm and dry  Neurological:  · Alert and oriented. · Moves all extremities well. Appears to have left sided weakness. · No abnormalities of mood, affect, memory, mentation, or behavior are noted    DATA:    Diagnostics:      EKG: NSR with PVCs. ECHO: obtained and demonstrate ejection fraction 56%.      Labs:     CBC:   Recent Labs     07/30/20  0644 07/31/20  0605   WBC 7.5 8.7   HGB 14.8 14.0   HCT 42.2 43.0   PLT See Reflexed IPF Result 168     BMP:   Recent Labs     07/30/20  0644 07/31/20  0605   * 129*   K 3.5* 3.6*   CO2 24 22   BUN 10 12 CREATININE 0.43* 0.49*   LABGLOM >60 >60   GLUCOSE 115* 99     BNP: No results for input(s): BNP in the last 72 hours. PT/INR:   Recent Labs     07/29/20  1348   PROTIME 10.7   INR 1.0     APTT:  Recent Labs     07/29/20  1348   APTT 18.7*     CARDIAC ENZYMES:  Recent Labs     07/29/20  1348   CKTOTAL 56   CKMB 1.6     FASTING LIPID PANEL:  Lab Results   Component Value Date    HDL 65 07/30/2020    TRIG 48 07/30/2020     LIVER PROFILE:No results for input(s): AST, ALT, LABALBU in the last 72 hours. IMPRESSION:    Patient Active Problem List   Diagnosis    [facility]    SAH ([facility]arachnoid hemorrhage) ([facility]    Acute cerebral infarction ([address]    Left hemiparesis ([address]    Parkinson disease ([address]    Cerebrovascular accident (CVA) ([address]       RECOMMENDATIONS:  1. Postraumatic Subarachnoid Hemorrhage s/p Fall with Right Cerebral Infarction with Left Hemiparesis. Neurosurgery following. 2. Plan for C1/2 Laminectomy and C4/5 Laminectomy with Fusion. 3. Revised Cardiac Risk Index 1 point for history of cerebrovascular disease resulting in a class II risk with a 6.0% 30 day risk of Death, MI or Cardiac arrest.  4. Plan to be discussed with Dr. Ryan Villavicencio. Discussed with patient and Nurse. Electronically signed by [place] DO on 8/1/2020 at 7:36 AM    [facility]         Attending Physician Statement  I have discussed the care of Kennedy Russell, including pertinent history and exam findings,  with the cardiology fellow/resident. I have seen and examined the patient and the key elements of all parts of the encounter have been performed by me. I agree with the assessment, plan and orders as documented by the resident with additional recommendations as below:       Mechanical fall, posy traumatic sub arachnoid hemorrhage and cervical cord compression, plan for laminectomy and decompression, no previous hx of CAD, CHF or CKD, patient denied any chest pain/dyspnea/syncope. ECG shows no ischemic changes. Echo shows overall preserved LV systolic function and no significant valvular abnormality. Ok to proceed with surgery with low to intermediate risk of MACE. Discussed with patient and his son at bedside who verbalizes understanding. Will sign off, please call with questions. Thank you for allowing me to participate in the care of this patient, please do not hesitate to call with questions.           Francoise Juarez MD, [facility]

## 2020-08-01 NOTE — PROGRESS NOTES
PROGRESS NOTE          PATIENT NAME: [facility] RECORD NO. [id number]  DATE: 8/1/2020  SURGEON: Jose Gunn   PRIMARY CARE PHYSICIAN: Madeline Murrieta MD    HD: # 3    ASSESSMENT    Patient Active Problem List   Diagnosis    Acute ischemic stroke ([address]    SAH (subarachnoid hemorrhage) ([address]    Acute cerebral infarction ([address]    Left hemiparesis ([address]    Parkinson disease ([address]    Cerebrovascular accident (CVA) [facility])       MEDICAL DECISION MAKING AND PLAN    1. Neuro  1. Small subarachnoid hemorrhage  1. Neurosurgery on board  2. No need for repeat head CT  3. Pain controlled on tylenol, robaxin  4. Please keep systolic blood pressure less than 185  5. Please keep blood glucose less than 180  2. Plan to restart home Parkinson medications  3. Neurology on board  4. MRI shows possible spinal cord edema, ligamentous injury is not ruled out       -Neurosurgery recommends C1/C2 laminectomy, C4/5 laminectomy and fusion                   -Cardiology to see for surgery clearance  2. GI  1. Tolerating General Diet  3. Renal/Electrolytes  1. Replace electrolytes as needed  2. Urine output: 0.9 mL/KG/hour  3. Follow-up BMP today  1.  yesterday, K 3.6 Cr 0.49  4. Heme  1. `Follow up CBC today  2. hgb 14.0 wbc 8.7 yesterday  5. LDA  1. Condom catheter removed D/T patient pulling it out  6. Dispo  1. Awaiting PT/OT recs  2. PMR consult      Chief Complaint: \" I have a headache\"    [address] Patient seen and examined. No acute events overnight. Patient is tolerating a general diet well. He is urinating adequately, he has not had a bowel movement, although he is passing flatus. He states he has not been taking his stool softeners, because he did not know they were available to him. Neurosurgery is on board and recommended C1/C2 laminectomy, C4/5 laminectomy and fusion, although the patient states he does not wish to pursue surgical options at this time.   Cardiology to see for surgical clearance in case surgical treatment is opted for by patient. Vital stable, patient afebrile      OBJECTIVE  VITALS: Temp: Temp: 98.9 °F (37.2 °C)Temp  Av.2 °F (36.8 °C)  Min: 97.5 °F (36.4 °C)  Max: 98.9 °F (77.8 °C) BP Systolic (25FYS), TXR:019 , Min:147 , CJN:371   Diastolic (19UPB), YOL:55, Min:80, Max:84   Pulse Pulse  Av.6  Min: 62  Max: 75 Resp Resp  Av.8  Min: 16  Max: 20 Pulse ox SpO2  Av.8 %  Min: 96 %  Max: 98 %  GENERAL: alert, no distress  NEURO: CN II-XI grossly intact, patient does have some decreased  strength in the left hand. Patient has a pill-rolling tremor in right hand, left sided facial droop still present  HEENT: Normocephalic, atraumatic  LUNGS: clear to ausculation, without wheezes, rales or rhonci  HEART: normal rate and regular rhythm  ABDOMEN: soft, non-tender, non-distended, bowel sounds present in all 4 quadrants and no guarding or peritoneal signs present  EXTREMITY:  No edema, cyanosis, deformity noted    I/O last 3 completed shifts: In: 1350 [P.O.:1350]  Out: 900 [Urine:900]    Drain/tube output: In: 1350 [P.O.:1350]  Out: 900 [Urine:900]    LAB:  CBC:   Recent Labs     20  1348 20  0644 20  0605   WBC 5.7 7.5 8.7   HGB 12.0* 14.8 14.0   HCT 36.6* 42.2 43.0   .1 91.9 96.6   PLT See Reflexed IPF Result See Reflexed IPF Result 168     BMP:   Recent Labs     20  1348 20  0644 20  0605    131* 129*   K 4.1 3.5* 3.6*   CL 99 92* 93*   CO2 25 24 22   BUN 16 10 12   CREATININE 0.51* 0.43* 0.49*   GLUCOSE 81 115* 99     COAGS:   Recent Labs     20  1348   APTT 18.7*   INR 1.0       RADIOLOGY:  No new studies      Brigitte Miller DO  20, 7:41 AM         Attending Note      I have reviewed the above [facility] note(s) and I either performed the key elements of the medical history and physical exam or was present with the trauma team when the key elements of the medical history and physical exam were performed.

## 2020-08-02 ENCOUNTER — ANESTHESIA (OUTPATIENT)
Dept: OPERATING ROOM | Age: 85
DRG: 957 | End: 2020-08-02
Payer: COMMERCIAL

## 2020-08-02 ENCOUNTER — APPOINTMENT (OUTPATIENT)
Dept: GENERAL RADIOLOGY | Age: 85
DRG: 957 | End: 2020-08-02
Payer: COMMERCIAL

## 2020-08-02 VITALS — SYSTOLIC BLOOD PRESSURE: 164 MMHG | OXYGEN SATURATION: 100 % | TEMPERATURE: 98.8 F | DIASTOLIC BLOOD PRESSURE: 97 MMHG

## 2020-08-02 LAB
ABSOLUTE EOS #: 0.05 K/UL (ref 0–0.44)
ABSOLUTE IMMATURE GRANULOCYTE: 0.04 K/UL (ref 0–0.3)
ABSOLUTE LYMPH #: 0.82 K/UL (ref 1.1–3.7)
ABSOLUTE MONO #: 0.83 K/UL (ref 0.1–1.2)
ANION GAP SERPL CALCULATED.3IONS-SCNC: 10 MMOL/L (ref 9–17)
BASOPHILS # BLD: 0 % (ref 0–2)
BASOPHILS ABSOLUTE: <0.03 K/UL (ref 0–0.2)
BUN BLDV-MCNC: 16 MG/DL (ref 8–23)
BUN/CREAT BLD: ABNORMAL (ref 9–20)
CALCIUM SERPL-MCNC: 8.5 MG/DL (ref 8.6–10.4)
CHLORIDE BLD-SCNC: 93 MMOL/L (ref 98–107)
CO2: 24 MMOL/L (ref 20–31)
CREAT SERPL-MCNC: 0.52 MG/DL (ref 0.7–1.2)
CULTURE: NORMAL
DIFFERENTIAL TYPE: ABNORMAL
EOSINOPHILS RELATIVE PERCENT: 1 % (ref 1–4)
GFR AFRICAN AMERICAN: >60 ML/MIN
GFR NON-AFRICAN AMERICAN: >60 ML/MIN
GFR SERPL CREATININE-BSD FRML MDRD: ABNORMAL ML/MIN/{1.73_M2}
GFR SERPL CREATININE-BSD FRML MDRD: ABNORMAL ML/MIN/{1.73_M2}
GLUCOSE BLD-MCNC: 120 MG/DL (ref 75–110)
GLUCOSE BLD-MCNC: 95 MG/DL (ref 70–99)
HCT VFR BLD CALC: 37.3 % (ref 40.7–50.3)
HEMOGLOBIN: 12.3 G/DL (ref 13–17)
IMMATURE GRANULOCYTES: 1 %
LYMPHOCYTES # BLD: 13 % (ref 24–43)
Lab: NORMAL
MCH RBC QN AUTO: 31.5 PG (ref 25.2–33.5)
MCHC RBC AUTO-ENTMCNC: 33 G/DL (ref 28.4–34.8)
MCV RBC AUTO: 95.6 FL (ref 82.6–102.9)
MONOCYTES # BLD: 13 % (ref 3–12)
NRBC AUTOMATED: 0 PER 100 WBC
PDW BLD-RTO: 13.8 % (ref 11.8–14.4)
PLATELET # BLD: 134 K/UL (ref 138–453)
PLATELET ESTIMATE: ABNORMAL
PMV BLD AUTO: 11.6 FL (ref 8.1–13.5)
POTASSIUM SERPL-SCNC: 4.2 MMOL/L (ref 3.7–5.3)
RBC # BLD: 3.9 M/UL (ref 4.21–5.77)
RBC # BLD: ABNORMAL 10*6/UL
SEG NEUTROPHILS: 73 % (ref 36–65)
SEGMENTED NEUTROPHILS ABSOLUTE COUNT: 4.73 K/UL (ref 1.5–8.1)
SODIUM BLD-SCNC: 127 MMOL/L (ref 135–144)
SPECIMEN DESCRIPTION: NORMAL
WBC # BLD: 6.5 K/UL (ref 3.5–11.3)
WBC # BLD: ABNORMAL 10*3/UL

## 2020-08-02 PROCEDURE — 22600 ARTHRD PST TQ 1NTRSPC CRV: CPT | Performed by: NEUROLOGICAL SURGERY

## 2020-08-02 PROCEDURE — 2580000003 HC RX 258: Performed by: PHYSICIAN ASSISTANT

## 2020-08-02 PROCEDURE — 6370000000 HC RX 637 (ALT 250 FOR IP): Performed by: PHYSICIAN ASSISTANT

## 2020-08-02 PROCEDURE — C1762 CONN TISS, HUMAN(INC FASCIA): HCPCS | Performed by: NEUROLOGICAL SURGERY

## 2020-08-02 PROCEDURE — 00NW0ZZ RELEASE CERVICAL SPINAL CORD, OPEN APPROACH: ICD-10-PCS | Performed by: NEUROLOGICAL SURGERY

## 2020-08-02 PROCEDURE — 3700000000 HC ANESTHESIA ATTENDED CARE: Performed by: NEUROLOGICAL SURGERY

## 2020-08-02 PROCEDURE — 2500000003 HC RX 250 WO HCPCS: Performed by: STUDENT IN AN ORGANIZED HEALTH CARE EDUCATION/TRAINING PROGRAM

## 2020-08-02 PROCEDURE — C9290 INJ, BUPIVACAINE LIPOSOME: HCPCS | Performed by: NEUROLOGICAL SURGERY

## 2020-08-02 PROCEDURE — 63048 LAM FACETEC &FORAMOT EA ADDL: CPT | Performed by: NEUROLOGICAL SURGERY

## 2020-08-02 PROCEDURE — 6360000002 HC RX W HCPCS: Performed by: NEUROLOGICAL SURGERY

## 2020-08-02 PROCEDURE — 2580000003 HC RX 258: Performed by: ANESTHESIOLOGY

## 2020-08-02 PROCEDURE — 2060000000 HC ICU INTERMEDIATE R&B

## 2020-08-02 PROCEDURE — 01N10ZZ RELEASE CERVICAL NERVE, OPEN APPROACH: ICD-10-PCS | Performed by: NEUROLOGICAL SURGERY

## 2020-08-02 PROCEDURE — 2720000010 HC SURG SUPPLY STERILE: Performed by: NEUROLOGICAL SURGERY

## 2020-08-02 PROCEDURE — 85025 COMPLETE CBC W/AUTO DIFF WBC: CPT

## 2020-08-02 PROCEDURE — 72040 X-RAY EXAM NECK SPINE 2-3 VW: CPT

## 2020-08-02 PROCEDURE — 86403 PARTICLE AGGLUT ANTBDY SCRN: CPT

## 2020-08-02 PROCEDURE — 6360000002 HC RX W HCPCS: Performed by: STUDENT IN AN ORGANIZED HEALTH CARE EDUCATION/TRAINING PROGRAM

## 2020-08-02 PROCEDURE — 6360000002 HC RX W HCPCS: Performed by: ANESTHESIOLOGY

## 2020-08-02 PROCEDURE — 0RG107J FUSION OF CERVICAL VERTEBRAL JOINT WITH AUTOLOGOUS TISSUE SUBSTITUTE, POSTERIOR APPROACH, ANTERIOR COLUMN, OPEN APPROACH: ICD-10-PCS | Performed by: NEUROLOGICAL SURGERY

## 2020-08-02 PROCEDURE — 36415 COLL VENOUS BLD VENIPUNCTURE: CPT

## 2020-08-02 PROCEDURE — 6360000002 HC RX W HCPCS: Performed by: NURSE ANESTHETIST, CERTIFIED REGISTERED

## 2020-08-02 PROCEDURE — 7100000001 HC PACU RECOVERY - ADDTL 15 MIN: Performed by: NEUROLOGICAL SURGERY

## 2020-08-02 PROCEDURE — 2500000003 HC RX 250 WO HCPCS: Performed by: NURSE ANESTHETIST, CERTIFIED REGISTERED

## 2020-08-02 PROCEDURE — 87086 URINE CULTURE/COLONY COUNT: CPT

## 2020-08-02 PROCEDURE — 99232 SBSQ HOSP IP/OBS MODERATE 35: CPT | Performed by: PSYCHIATRY & NEUROLOGY

## 2020-08-02 PROCEDURE — APPSS30 APP SPLIT SHARED TIME 16-30 MINUTES: Performed by: PHYSICIAN ASSISTANT

## 2020-08-02 PROCEDURE — 22840 INSERT SPINE FIXATION DEVICE: CPT | Performed by: NEUROLOGICAL SURGERY

## 2020-08-02 PROCEDURE — 2709999900 HC NON-CHARGEABLE SUPPLY: Performed by: NEUROLOGICAL SURGERY

## 2020-08-02 PROCEDURE — 82947 ASSAY GLUCOSE BLOOD QUANT: CPT

## 2020-08-02 PROCEDURE — 6370000000 HC RX 637 (ALT 250 FOR IP): Performed by: NEUROLOGICAL SURGERY

## 2020-08-02 PROCEDURE — 6360000002 HC RX W HCPCS: Performed by: PHYSICIAN ASSISTANT

## 2020-08-02 PROCEDURE — 7100000000 HC PACU RECOVERY - FIRST 15 MIN: Performed by: NEUROLOGICAL SURGERY

## 2020-08-02 PROCEDURE — 87186 SC STD MICRODIL/AGAR DIL: CPT

## 2020-08-02 PROCEDURE — 2580000003 HC RX 258: Performed by: NEUROLOGICAL SURGERY

## 2020-08-02 PROCEDURE — 3600000004 HC SURGERY LEVEL 4 BASE: Performed by: NEUROLOGICAL SURGERY

## 2020-08-02 PROCEDURE — 63045 LAM FACETEC & FORAMOT CRV: CPT | Performed by: NEUROLOGICAL SURGERY

## 2020-08-02 PROCEDURE — 80048 BASIC METABOLIC PNL TOTAL CA: CPT

## 2020-08-02 PROCEDURE — 2580000003 HC RX 258: Performed by: STUDENT IN AN ORGANIZED HEALTH CARE EDUCATION/TRAINING PROGRAM

## 2020-08-02 PROCEDURE — PREOPEXAM PRE-OP EXAM: Performed by: NEUROLOGICAL SURGERY

## 2020-08-02 PROCEDURE — 2780000010 HC IMPLANT OTHER: Performed by: NEUROLOGICAL SURGERY

## 2020-08-02 PROCEDURE — 2580000003 HC RX 258: Performed by: NURSE ANESTHETIST, CERTIFIED REGISTERED

## 2020-08-02 PROCEDURE — 3700000001 HC ADD 15 MINUTES (ANESTHESIA): Performed by: NEUROLOGICAL SURGERY

## 2020-08-02 PROCEDURE — 2500000003 HC RX 250 WO HCPCS: Performed by: ANESTHESIOLOGY

## 2020-08-02 PROCEDURE — C1713 ANCHOR/SCREW BN/BN,TIS/BN: HCPCS | Performed by: NEUROLOGICAL SURGERY

## 2020-08-02 PROCEDURE — 2500000003 HC RX 250 WO HCPCS: Performed by: NEUROLOGICAL SURGERY

## 2020-08-02 PROCEDURE — 2500000003 HC RX 250 WO HCPCS: Performed by: PHYSICIAN ASSISTANT

## 2020-08-02 PROCEDURE — 3600000014 HC SURGERY LEVEL 4 ADDTL 15MIN: Performed by: NEUROLOGICAL SURGERY

## 2020-08-02 DEVICE — SET SCREW 3600315 STANDARD
Type: IMPLANTABLE DEVICE | Site: BACK | Status: FUNCTIONAL
Brand: INFINITY™ OCCIPITOCERVICAL UPPER THORACIC SYSTEM

## 2020-08-02 DEVICE — AGENT HEMOSTATIC SURGIFLOW MATRIX KIT W/THROMBIN: Type: IMPLANTABLE DEVICE | Status: FUNCTIONAL

## 2020-08-02 DEVICE — GRAFT BNE VIABLE 5CC TIM MTRX FIBERCEL: Type: IMPLANTABLE DEVICE | Status: FUNCTIONAL

## 2020-08-02 RX ORDER — SODIUM CHLORIDE 0.9 % (FLUSH) 0.9 %
10 SYRINGE (ML) INJECTION PRN
Status: DISCONTINUED | OUTPATIENT
Start: 2020-08-02 | End: 2020-08-06 | Stop reason: HOSPADM

## 2020-08-02 RX ORDER — FENTANYL CITRATE 50 UG/ML
50 INJECTION, SOLUTION INTRAMUSCULAR; INTRAVENOUS EVERY 5 MIN PRN
Status: DISCONTINUED | OUTPATIENT
Start: 2020-08-02 | End: 2020-08-02

## 2020-08-02 RX ORDER — SODIUM CHLORIDE 9 MG/ML
INJECTION, SOLUTION INTRAVENOUS CONTINUOUS
Status: DISCONTINUED | OUTPATIENT
Start: 2020-08-02 | End: 2020-08-03

## 2020-08-02 RX ORDER — PROPOFOL 10 MG/ML
INJECTION, EMULSION INTRAVENOUS PRN
Status: DISCONTINUED | OUTPATIENT
Start: 2020-08-02 | End: 2020-08-02 | Stop reason: SDUPTHER

## 2020-08-02 RX ORDER — FENTANYL CITRATE 50 UG/ML
50 INJECTION, SOLUTION INTRAMUSCULAR; INTRAVENOUS ONCE
Status: COMPLETED | OUTPATIENT
Start: 2020-08-02 | End: 2020-08-02

## 2020-08-02 RX ORDER — DEXAMETHASONE SODIUM PHOSPHATE 10 MG/ML
INJECTION INTRAMUSCULAR; INTRAVENOUS PRN
Status: DISCONTINUED | OUTPATIENT
Start: 2020-08-02 | End: 2020-08-02 | Stop reason: SDUPTHER

## 2020-08-02 RX ORDER — SODIUM CHLORIDE 0.9 % (FLUSH) 0.9 %
10 SYRINGE (ML) INJECTION EVERY 12 HOURS SCHEDULED
Status: DISCONTINUED | OUTPATIENT
Start: 2020-08-02 | End: 2020-08-02 | Stop reason: HOSPADM

## 2020-08-02 RX ORDER — LIDOCAINE HYDROCHLORIDE 10 MG/ML
INJECTION, SOLUTION EPIDURAL; INFILTRATION; INTRACAUDAL; PERINEURAL PRN
Status: DISCONTINUED | OUTPATIENT
Start: 2020-08-02 | End: 2020-08-02 | Stop reason: SDUPTHER

## 2020-08-02 RX ORDER — FENTANYL CITRATE 50 UG/ML
INJECTION, SOLUTION INTRAMUSCULAR; INTRAVENOUS PRN
Status: DISCONTINUED | OUTPATIENT
Start: 2020-08-02 | End: 2020-08-02 | Stop reason: SDUPTHER

## 2020-08-02 RX ORDER — MORPHINE SULFATE 2 MG/ML
2 INJECTION, SOLUTION INTRAMUSCULAR; INTRAVENOUS
Status: DISCONTINUED | OUTPATIENT
Start: 2020-08-02 | End: 2020-08-03

## 2020-08-02 RX ORDER — SENNA AND DOCUSATE SODIUM 50; 8.6 MG/1; MG/1
1 TABLET, FILM COATED ORAL 2 TIMES DAILY
Status: DISCONTINUED | OUTPATIENT
Start: 2020-08-02 | End: 2020-08-06 | Stop reason: HOSPADM

## 2020-08-02 RX ORDER — LIDOCAINE HYDROCHLORIDE AND EPINEPHRINE 10; 10 MG/ML; UG/ML
INJECTION, SOLUTION INFILTRATION; PERINEURAL PRN
Status: DISCONTINUED | OUTPATIENT
Start: 2020-08-02 | End: 2020-08-02 | Stop reason: ALTCHOICE

## 2020-08-02 RX ORDER — SODIUM CHLORIDE 9 MG/ML
INJECTION INTRAVENOUS PRN
Status: DISCONTINUED | OUTPATIENT
Start: 2020-08-02 | End: 2020-08-02 | Stop reason: SDUPTHER

## 2020-08-02 RX ORDER — BISACODYL 10 MG
10 SUPPOSITORY, RECTAL RECTAL DAILY PRN
Status: DISCONTINUED | OUTPATIENT
Start: 2020-08-02 | End: 2020-08-06 | Stop reason: HOSPADM

## 2020-08-02 RX ORDER — CEFAZOLIN SODIUM 1 G/3ML
INJECTION, POWDER, FOR SOLUTION INTRAMUSCULAR; INTRAVENOUS PRN
Status: DISCONTINUED | OUTPATIENT
Start: 2020-08-02 | End: 2020-08-02 | Stop reason: SDUPTHER

## 2020-08-02 RX ORDER — SODIUM CHLORIDE 0.9 % (FLUSH) 0.9 %
10 SYRINGE (ML) INJECTION PRN
Status: DISCONTINUED | OUTPATIENT
Start: 2020-08-02 | End: 2020-08-02 | Stop reason: HOSPADM

## 2020-08-02 RX ORDER — SODIUM CHLORIDE 0.9 % (FLUSH) 0.9 %
10 SYRINGE (ML) INJECTION EVERY 12 HOURS SCHEDULED
Status: DISCONTINUED | OUTPATIENT
Start: 2020-08-02 | End: 2020-08-06 | Stop reason: HOSPADM

## 2020-08-02 RX ORDER — ONDANSETRON 2 MG/ML
INJECTION INTRAMUSCULAR; INTRAVENOUS PRN
Status: DISCONTINUED | OUTPATIENT
Start: 2020-08-02 | End: 2020-08-02 | Stop reason: SDUPTHER

## 2020-08-02 RX ORDER — GINSENG 100 MG
CAPSULE ORAL PRN
Status: DISCONTINUED | OUTPATIENT
Start: 2020-08-02 | End: 2020-08-02 | Stop reason: ALTCHOICE

## 2020-08-02 RX ORDER — ACETAMINOPHEN 325 MG/1
650 TABLET ORAL EVERY 4 HOURS PRN
Status: DISCONTINUED | OUTPATIENT
Start: 2020-08-02 | End: 2020-08-03

## 2020-08-02 RX ORDER — MORPHINE SULFATE 4 MG/ML
4 INJECTION, SOLUTION INTRAMUSCULAR; INTRAVENOUS
Status: DISCONTINUED | OUTPATIENT
Start: 2020-08-02 | End: 2020-08-03

## 2020-08-02 RX ORDER — OXYCODONE HYDROCHLORIDE 5 MG/1
10 TABLET ORAL EVERY 4 HOURS PRN
Status: DISCONTINUED | OUTPATIENT
Start: 2020-08-02 | End: 2020-08-03

## 2020-08-02 RX ORDER — FENTANYL CITRATE 50 UG/ML
25 INJECTION, SOLUTION INTRAMUSCULAR; INTRAVENOUS EVERY 5 MIN PRN
Status: DISCONTINUED | OUTPATIENT
Start: 2020-08-02 | End: 2020-08-02

## 2020-08-02 RX ORDER — MAGNESIUM HYDROXIDE 1200 MG/15ML
LIQUID ORAL CONTINUOUS PRN
Status: COMPLETED | OUTPATIENT
Start: 2020-08-02 | End: 2020-08-02

## 2020-08-02 RX ORDER — OXYCODONE HYDROCHLORIDE 5 MG/1
5 TABLET ORAL EVERY 4 HOURS PRN
Status: DISCONTINUED | OUTPATIENT
Start: 2020-08-02 | End: 2020-08-03

## 2020-08-02 RX ORDER — VANCOMYCIN HYDROCHLORIDE 1 G/20ML
INJECTION, POWDER, LYOPHILIZED, FOR SOLUTION INTRAVENOUS PRN
Status: DISCONTINUED | OUTPATIENT
Start: 2020-08-02 | End: 2020-08-02 | Stop reason: ALTCHOICE

## 2020-08-02 RX ORDER — SODIUM CHLORIDE 9 MG/ML
INJECTION, SOLUTION INTRAVENOUS CONTINUOUS PRN
Status: DISCONTINUED | OUTPATIENT
Start: 2020-08-02 | End: 2020-08-02 | Stop reason: SDUPTHER

## 2020-08-02 RX ORDER — PHENYLEPHRINE HYDROCHLORIDE 10 MG/ML
INJECTION INTRAVENOUS PRN
Status: DISCONTINUED | OUTPATIENT
Start: 2020-08-02 | End: 2020-08-02 | Stop reason: SDUPTHER

## 2020-08-02 RX ORDER — ROCURONIUM BROMIDE 10 MG/ML
INJECTION, SOLUTION INTRAVENOUS PRN
Status: DISCONTINUED | OUTPATIENT
Start: 2020-08-02 | End: 2020-08-02 | Stop reason: SDUPTHER

## 2020-08-02 RX ADMIN — METHOCARBAMOL TABLETS 750 MG: 750 TABLET, COATED ORAL at 22:00

## 2020-08-02 RX ADMIN — SODIUM CHLORIDE TAB 1 GM 1 G: 1 TAB at 17:16

## 2020-08-02 RX ADMIN — ACETAMINOPHEN 1000 MG: 500 TABLET ORAL at 22:14

## 2020-08-02 RX ADMIN — CARBIDOPA AND LEVODOPA 1 TABLET: 25; 100 TABLET, EXTENDED RELEASE ORAL at 22:05

## 2020-08-02 RX ADMIN — SODIUM CHLORIDE: 9 INJECTION, SOLUTION INTRAVENOUS at 08:07

## 2020-08-02 RX ADMIN — PHENYLEPHRINE HYDROCHLORIDE 100 MCG: 10 INJECTION INTRAVENOUS at 08:53

## 2020-08-02 RX ADMIN — PHENYLEPHRINE HYDROCHLORIDE 100 MCG: 10 INJECTION INTRAVENOUS at 10:28

## 2020-08-02 RX ADMIN — LIDOCAINE HYDROCHLORIDE 50 MG: 10 INJECTION, SOLUTION EPIDURAL; INFILTRATION; INTRACAUDAL; PERINEURAL at 08:16

## 2020-08-02 RX ADMIN — PHENYLEPHRINE HYDROCHLORIDE 100 MCG: 10 INJECTION INTRAVENOUS at 12:04

## 2020-08-02 RX ADMIN — SODIUM CHLORIDE: 9 INJECTION, SOLUTION INTRAVENOUS at 11:52

## 2020-08-02 RX ADMIN — SODIUM CHLORIDE: 9 INJECTION, SOLUTION INTRAVENOUS at 12:31

## 2020-08-02 RX ADMIN — AMANTADINE HYDROCHLORIDE 100 MG: 100 CAPSULE ORAL at 22:00

## 2020-08-02 RX ADMIN — FENTANYL CITRATE 100 MCG: 50 INJECTION INTRAMUSCULAR; INTRAVENOUS at 08:16

## 2020-08-02 RX ADMIN — DEXAMETHASONE SODIUM PHOSPHATE 10 MG: 10 INJECTION INTRAMUSCULAR; INTRAVENOUS at 09:08

## 2020-08-02 RX ADMIN — ROCURONIUM BROMIDE 20 MG: 10 INJECTION INTRAVENOUS at 10:08

## 2020-08-02 RX ADMIN — ATORVASTATIN CALCIUM 20 MG: 20 TABLET, FILM COATED ORAL at 22:01

## 2020-08-02 RX ADMIN — SODIUM CHLORIDE, PRESERVATIVE FREE 10 ML: 5 INJECTION INTRAVENOUS at 09:00

## 2020-08-02 RX ADMIN — SUFENTANIL CITRATE 0.2 MCG/KG/HR: 50 INJECTION EPIDURAL; INTRAVENOUS at 08:53

## 2020-08-02 RX ADMIN — LIDOCAINE HYDROCHLORIDE 50 MG: 10 INJECTION, SOLUTION EPIDURAL; INFILTRATION; INTRACAUDAL; PERINEURAL at 08:47

## 2020-08-02 RX ADMIN — CEFAZOLIN 2000 MG: 1 INJECTION, POWDER, FOR SOLUTION INTRAMUSCULAR; INTRAVENOUS at 13:02

## 2020-08-02 RX ADMIN — STANDARDIZED SENNA CONCENTRATE AND DOCUSATE SODIUM 1 TABLET: 8.6; 5 TABLET ORAL at 22:02

## 2020-08-02 RX ADMIN — CEFAZOLIN 2000 MG: 1 INJECTION, POWDER, FOR SOLUTION INTRAMUSCULAR; INTRAVENOUS at 09:04

## 2020-08-02 RX ADMIN — CEFAZOLIN 2 G: 10 INJECTION, POWDER, FOR SOLUTION INTRAVENOUS at 22:02

## 2020-08-02 RX ADMIN — SUGAMMADEX 200 MG: 100 INJECTION, SOLUTION INTRAVENOUS at 13:12

## 2020-08-02 RX ADMIN — ONDANSETRON 4 MG: 2 INJECTION, SOLUTION INTRAMUSCULAR; INTRAVENOUS at 12:55

## 2020-08-02 RX ADMIN — PHENYLEPHRINE HYDROCHLORIDE 25 MCG/MIN: 10 INJECTION INTRAVENOUS at 08:53

## 2020-08-02 RX ADMIN — PROPRANOLOL HYDROCHLORIDE 40 MG: 40 TABLET ORAL at 22:00

## 2020-08-02 RX ADMIN — Medication 10 ML: at 22:03

## 2020-08-02 RX ADMIN — PROPOFOL 80 MG: 10 INJECTION, EMULSION INTRAVENOUS at 08:16

## 2020-08-02 RX ADMIN — Medication 10 MG: at 04:59

## 2020-08-02 RX ADMIN — SODIUM CHLORIDE: 9 INJECTION, SOLUTION INTRAVENOUS at 15:13

## 2020-08-02 RX ADMIN — Medication 10 MG: at 16:19

## 2020-08-02 RX ADMIN — SODIUM CHLORIDE 10 ML: 9 INJECTION INTRAMUSCULAR; INTRAVENOUS; SUBCUTANEOUS at 09:08

## 2020-08-02 RX ADMIN — PRIMIDONE 50 MG: 50 TABLET ORAL at 22:02

## 2020-08-02 RX ADMIN — PHENYLEPHRINE HYDROCHLORIDE 200 MCG: 10 INJECTION INTRAVENOUS at 09:01

## 2020-08-02 RX ADMIN — Medication 10 ML: at 05:00

## 2020-08-02 RX ADMIN — ROCURONIUM BROMIDE 50 MG: 10 INJECTION INTRAVENOUS at 08:16

## 2020-08-02 RX ADMIN — CARBIDOPA AND LEVODOPA 2 TABLET: 25; 100 TABLET ORAL at 22:01

## 2020-08-02 RX ADMIN — FENTANYL CITRATE 50 MCG: 50 INJECTION INTRAMUSCULAR; INTRAVENOUS at 17:24

## 2020-08-02 RX ADMIN — DOXAZOSIN 2 MG: 2 TABLET ORAL at 22:01

## 2020-08-02 ASSESSMENT — PULMONARY FUNCTION TESTS
PIF_VALUE: 21
PIF_VALUE: 21
PIF_VALUE: 22
PIF_VALUE: 21
PIF_VALUE: 22
PIF_VALUE: 23
PIF_VALUE: 18
PIF_VALUE: 22
PIF_VALUE: 23
PIF_VALUE: 22
PIF_VALUE: 1
PIF_VALUE: 23
PIF_VALUE: 19
PIF_VALUE: 22
PIF_VALUE: 22
PIF_VALUE: 21
PIF_VALUE: 22
PIF_VALUE: 22
PIF_VALUE: 18
PIF_VALUE: 22
PIF_VALUE: 22
PIF_VALUE: 21
PIF_VALUE: 22
PIF_VALUE: 22
PIF_VALUE: 21
PIF_VALUE: 22
PIF_VALUE: 16
PIF_VALUE: 22
PIF_VALUE: 22
PIF_VALUE: 21
PIF_VALUE: 23
PIF_VALUE: 22
PIF_VALUE: 20
PIF_VALUE: 22
PIF_VALUE: 23
PIF_VALUE: 22
PIF_VALUE: 23
PIF_VALUE: 21
PIF_VALUE: 2
PIF_VALUE: 21
PIF_VALUE: 22
PIF_VALUE: 2
PIF_VALUE: 25
PIF_VALUE: 22
PIF_VALUE: 18
PIF_VALUE: 21
PIF_VALUE: 21
PIF_VALUE: 22
PIF_VALUE: 22
PIF_VALUE: 2
PIF_VALUE: 1
PIF_VALUE: 21
PIF_VALUE: 22
PIF_VALUE: 18
PIF_VALUE: 21
PIF_VALUE: 22
PIF_VALUE: 21
PIF_VALUE: 22
PIF_VALUE: 21
PIF_VALUE: 22
PIF_VALUE: 23
PIF_VALUE: 23
PIF_VALUE: 21
PIF_VALUE: 22
PIF_VALUE: 23
PIF_VALUE: 22
PIF_VALUE: 19
PIF_VALUE: 23
PIF_VALUE: 23
PIF_VALUE: 16
PIF_VALUE: 21
PIF_VALUE: 22
PIF_VALUE: 23
PIF_VALUE: 21
PIF_VALUE: 20
PIF_VALUE: 21
PIF_VALUE: 21
PIF_VALUE: 22
PIF_VALUE: 22
PIF_VALUE: 23
PIF_VALUE: 18
PIF_VALUE: 21
PIF_VALUE: 1
PIF_VALUE: 19
PIF_VALUE: 21
PIF_VALUE: 23
PIF_VALUE: 22
PIF_VALUE: 22
PIF_VALUE: 21
PIF_VALUE: 22
PIF_VALUE: 22
PIF_VALUE: 23
PIF_VALUE: 21
PIF_VALUE: 22
PIF_VALUE: 22
PIF_VALUE: 19
PIF_VALUE: 22
PIF_VALUE: 17
PIF_VALUE: 22
PIF_VALUE: 23
PIF_VALUE: 22
PIF_VALUE: 22
PIF_VALUE: 1
PIF_VALUE: 22
PIF_VALUE: 3
PIF_VALUE: 17
PIF_VALUE: 22
PIF_VALUE: 22
PIF_VALUE: 23
PIF_VALUE: 22
PIF_VALUE: 17
PIF_VALUE: 22
PIF_VALUE: 22
PIF_VALUE: 19
PIF_VALUE: 22
PIF_VALUE: 22
PIF_VALUE: 21
PIF_VALUE: 21
PIF_VALUE: 22
PIF_VALUE: 22
PIF_VALUE: 21
PIF_VALUE: 22
PIF_VALUE: 17
PIF_VALUE: 21
PIF_VALUE: 21
PIF_VALUE: 22
PIF_VALUE: 23
PIF_VALUE: 22
PIF_VALUE: 23
PIF_VALUE: 21
PIF_VALUE: 27
PIF_VALUE: 22
PIF_VALUE: 21
PIF_VALUE: 23
PIF_VALUE: 22
PIF_VALUE: 22
PIF_VALUE: 21
PIF_VALUE: 22
PIF_VALUE: 21
PIF_VALUE: 22
PIF_VALUE: 21
PIF_VALUE: 1
PIF_VALUE: 22
PIF_VALUE: 22
PIF_VALUE: 19
PIF_VALUE: 22
PIF_VALUE: 22
PIF_VALUE: 20
PIF_VALUE: 17
PIF_VALUE: 22
PIF_VALUE: 23
PIF_VALUE: 22
PIF_VALUE: 23
PIF_VALUE: 22
PIF_VALUE: 21
PIF_VALUE: 22
PIF_VALUE: 22
PIF_VALUE: 18
PIF_VALUE: 23
PIF_VALUE: 22
PIF_VALUE: 23
PIF_VALUE: 22
PIF_VALUE: 23
PIF_VALUE: 21
PIF_VALUE: 23
PIF_VALUE: 16
PIF_VALUE: 21
PIF_VALUE: 23
PIF_VALUE: 22
PIF_VALUE: 20
PIF_VALUE: 21
PIF_VALUE: 24
PIF_VALUE: 22
PIF_VALUE: 22
PIF_VALUE: 23
PIF_VALUE: 22
PIF_VALUE: 21
PIF_VALUE: 17
PIF_VALUE: 22
PIF_VALUE: 21
PIF_VALUE: 21
PIF_VALUE: 22
PIF_VALUE: 24
PIF_VALUE: 22
PIF_VALUE: 17
PIF_VALUE: 22
PIF_VALUE: 22
PIF_VALUE: 23
PIF_VALUE: 4
PIF_VALUE: 22
PIF_VALUE: 21
PIF_VALUE: 22
PIF_VALUE: 22
PIF_VALUE: 21
PIF_VALUE: 22
PIF_VALUE: 17
PIF_VALUE: 23
PIF_VALUE: 22
PIF_VALUE: 20
PIF_VALUE: 22
PIF_VALUE: 22
PIF_VALUE: 23
PIF_VALUE: 22
PIF_VALUE: 22
PIF_VALUE: 23
PIF_VALUE: 22
PIF_VALUE: 22
PIF_VALUE: 21
PIF_VALUE: 18
PIF_VALUE: 21
PIF_VALUE: 23
PIF_VALUE: 17
PIF_VALUE: 1
PIF_VALUE: 21
PIF_VALUE: 22
PIF_VALUE: 22
PIF_VALUE: 23
PIF_VALUE: 22
PIF_VALUE: 16
PIF_VALUE: 22
PIF_VALUE: 16
PIF_VALUE: 22
PIF_VALUE: 21
PIF_VALUE: 23
PIF_VALUE: 20
PIF_VALUE: 22
PIF_VALUE: 21
PIF_VALUE: 22
PIF_VALUE: 21
PIF_VALUE: 22
PIF_VALUE: 23
PIF_VALUE: 22
PIF_VALUE: 23
PIF_VALUE: 17
PIF_VALUE: 22
PIF_VALUE: 23
PIF_VALUE: 21
PIF_VALUE: 21
PIF_VALUE: 22
PIF_VALUE: 22
PIF_VALUE: 23
PIF_VALUE: 22
PIF_VALUE: 22
PIF_VALUE: 19
PIF_VALUE: 19
PIF_VALUE: 22
PIF_VALUE: 22
PIF_VALUE: 21
PIF_VALUE: 23
PIF_VALUE: 22
PIF_VALUE: 23
PIF_VALUE: 21
PIF_VALUE: 17
PIF_VALUE: 22
PIF_VALUE: 15
PIF_VALUE: 22
PIF_VALUE: 23
PIF_VALUE: 22
PIF_VALUE: 22
PIF_VALUE: 21
PIF_VALUE: 22
PIF_VALUE: 21
PIF_VALUE: 22
PIF_VALUE: 20
PIF_VALUE: 22
PIF_VALUE: 21

## 2020-08-02 ASSESSMENT — PAIN SCALES - GENERAL
PAINLEVEL_OUTOF10: 6
PAINLEVEL_OUTOF10: 3
PAINLEVEL_OUTOF10: 6
PAINLEVEL_OUTOF10: 0

## 2020-08-02 ASSESSMENT — PAIN DESCRIPTION - FREQUENCY: FREQUENCY: CONTINUOUS

## 2020-08-02 ASSESSMENT — PAIN DESCRIPTION - LOCATION: LOCATION: NECK

## 2020-08-02 ASSESSMENT — PAIN DESCRIPTION - ONSET: ONSET: ON-GOING

## 2020-08-02 ASSESSMENT — PAIN DESCRIPTION - PAIN TYPE: TYPE: ACUTE PAIN

## 2020-08-02 ASSESSMENT — PAIN DESCRIPTION - DESCRIPTORS: DESCRIPTORS: ACHING;CONSTANT

## 2020-08-02 ASSESSMENT — PAIN DESCRIPTION - PROGRESSION: CLINICAL_PROGRESSION: GRADUALLY IMPROVING

## 2020-08-02 NOTE — PROGRESS NOTES
Dr Laws [place] here at bedside /notified of present neuro assessment and vitals with increase b/p -may dc to room /no treatment for b/p at present

## 2020-08-02 NOTE — PROGRESS NOTES
Neurosurgery Post op Progress Note      POD# 0    s/p C1-2 laminectomy, C4-5 laminectomy with C5 left foraminotomy, instrumented fusion C4-5    SUBJECTIVE:      Patient presents with acute spinal cord injury, cervical stenosis C4-5 spondylolisthesis      OBJECTIVE      Physical exam   VITALS:    Vitals:    08/02/20 1618   BP: (!) 178/86   Pulse:    Resp:    Temp:    SpO2:      INTAKE:      Intake/Output Summary (Last 24 hours) at 8/2/2020 1733  Last data filed at 8/2/2020 1731  Gross per 24 hour   Intake 1860 ml   Output 1050 ml   Net 810 ml            Neurological exam   alert, oriented x3, affect appropriate, no focal neurological deficits and moves all extremities well with the exception of LUE 1/5 deltoid, 3/5 bicep, 3/5 tricep, and 4/5 HG  LLE 4/5 throughout       Wound   Post op wound:   Dressing is clean/dry/intact with no signs of drainage   ANGELICA drain intact     ASSESSMENT AND PLAN    81 y.o. male status post C1-2 laminectomy, C4-5 laminectomy with C5 left foraminotomy, instrumented fusion C4-5   post op day # 0    - Analgesia: Roxicodone, and IV morphine   - Periop Antibiotics: Ancef 1g q8hrs for 3 doses   - Activity: As tolerated while wearing Aspen collar  - DVT prophylaxis: SCDs  - Diet: Advance as tolerated    Electronically signed by SHANIKA Ortiz on 8/2/2020 at 5:33 PM

## 2020-08-02 NOTE — PROGRESS NOTES
NEUROLOGY INPATIENT PROGRESS NOTE    8/2/2020         Subjective: Promise Betancourt is a  81 y.o. male admitted on 7/29/2020 with SAH (subarachnoid hemorrhage) ([facility] [address] [I60.9]  Chart reviewed and discussed with caregivers. Patient had surgical intervention this morning. Caregivers stated that he was able to move left upper and left lower extremities \"better than yesterday\"  Earlier he has been much more alert and awake but presently patient is having visual hallucinations. Briefly, Matthew Brown is a  81 y.o. male with hx of parkinsonism (on Sinemet CR 25/100 bid, Symmetrel 100 bid, mysoline 100 bid), arthritic Rt knee was admitted on 7/29/2020 after a fall with left upper and left lower extremity weakness found to have acute right parietal infarction. CT head with right frontoparietal cortical subarachnoid bleed. MRI brain with small acute right parietal infarction. CTA head and neck: Left ICA 50-60% stenosis. Right ICA 20% stenosis. Stroke team evaluated the patient; not IV TPA candidate; okay to start aspirin 81 mg daily  Rpt CT head (8/1/20):  1.  No significant change from prior study.  Small area of hyperattenuation   in 1 of the high right parietal sulci which is suspicious for focal subarachnoid hemorrhage. 2.  Senescent changes including chronic microvascular change and remote appearing left frontal infarct.        No current facility-administered medications on file prior to encounter.       Current Outpatient Medications on File Prior to Encounter   Medication Sig Dispense Refill    carbidopa-levodopa (SINEMET)  MG per tablet Take 1 tablet by mouth 4 times daily Take 3 tablets at 6:30AM and at 10:30AM then take 2 tablets at 2:30PM and 6:30PM      carbidopa-levodopa (SINEMET CR)  MG per extended release tablet Take 1 tablet by mouth 4 times daily       carbidopa (LODOSYN) 25 MG TABS tablet Take by mouth 3 at 7, 3 at 11, 2 4 pm 2 at 7      citalopram (CELEXA) 20 MG tablet Take 20 mg by mouth daily      propranolol (INDERAL LA) 60 MG extended release capsule Take 60 mg by mouth 2 times daily       finasteride (PROSCAR) 5 MG tablet Take 5 mg by mouth daily      docusate sodium (COLACE) 100 MG capsule Take 100 mg by mouth 2 times daily      primidone (MYSOLINE) 50 MG tablet Take 100 mg by mouth 2 times daily      amantadine (SYMMETREL) 100 MG capsule Take 100 mg by mouth daily       doxazosin (CARDURA) 2 MG tablet Take 2 mg by mouth nightly      propranolol (INDERAL) 40 MG tablet Take 40 mg by mouth 2 times daily 60+40 BID      atorvastatin (LIPITOR) 10 MG tablet Take 10 mg by mouth every other day       ibuprofen (ADVIL;MOTRIN) 600 MG tablet Take 600 mg by mouth 2 times daily         Allergies: Vero Szymanski has No Known Allergies.     Past Medical History:   Diagnosis Date    BPH (benign prostatic hyperplasia)     Cancer (HCC)     basal cell carcinoma    Cervical spondylolysis     Depression     Hyperlipidemia     Hypertension     Neck pain     Neuropathy     Orofacial dyskinesia     Osteoarthritis     Parkinson's disease ([facility] [address]        Past Surgical History:   Procedure Laterality Date    CERVICAL FUSION  08/02/2020    C4-5    CERVICAL LAMINECTOMY  08/02/2020    C1-2/C 4-5    JOINT REPLACEMENT             Medications:     IV syringe builder   Intravenous Once    sodium chloride flush  10 mL Intravenous 2 times per day    ceFAZolin (ANCEF) IVPB  2 g Intravenous Q8H    sennosides-docusate sodium  1 tablet Oral BID    sodium chloride  1 g Oral TID WC    carbidopa-levodopa  1 tablet Oral 4x Daily    carbidopa-levodopa  3 tablet Oral BID    carbidopa-levodopa  2 tablet Oral BID    doxazosin  2 mg Oral Nightly    propranolol  60 mg Oral BID    propranolol  40 mg Oral BID    docusate sodium  100 mg Oral Daily    polyethylene glycol  17 g Oral Daily    finasteride  5 mg Oral Daily    citalopram  20 mg Oral Daily    primidone  50 mg Oral BID    amantadine  100 mg Oral BID    atorvastatin  20 mg Oral Nightly    sodium chloride flush  10 mL Intravenous 2 times per day    acetaminophen  1,000 mg Oral 3 times per day    methocarbamol  750 mg Oral TID    senna  1 tablet Oral Daily     PRN Meds include: sodium chloride flush, acetaminophen, oxyCODONE **OR** oxyCODONE, morphine **OR** morphine, magnesium hydroxide, bisacodyl, butalbital-acetaminophen-caffeine, labetalol, sodium chloride flush, ondansetron    Objective:   /73   Pulse 81   Temp 97.9 °F (36.6 °C) (Axillary)   Resp 17   Ht 5' 6\" (1.676 m)   Wt 164 lb 10.9 oz (74.7 kg)   SpO2 99%   BMI 26.58 kg/m²     Blood pressure range: Systolic (86IAQ), PTJ:964 , Min:76 , IFR:072   ; Diastolic (34BXF), HUS:16, Min:50, Max:97        NEUROLOGIC EXAMINATION  GENERAL  Appears comfortable and in no distress   HEENT  NC/ AT   cardiovascular  S1 and S2 heard; palpation of pulses: radial pulse    NECK  Supple and no bruits heard   MENTAL STATUS:  Alert, oriented, intact memory, no confusion, normal speech, normal language, no hallucination or delusion   CRANIAL NERVES: II     -      PERRLA, optic discs; clear; posterior segments  Visual fields intact to confrontation  III,IV,VI -  EOMs full, no afferent defect, no JOSE ALEJANDRO, no ptosis  V     -     Normal facial sensation   VII    -     Normal facial symmetry  VIII   -     Intact hearing   IX,X -     Symmetrical palate  XI    -     Symmetrical shoulder shrug  XII   -     Midline tongue, no atrophy    MOTOR FUNCTION:  significant for weakness of grade 2-3/5 in proximal muscle groups and 3+ to 4-/5 in distal muscle groups of left upper extremity. ,  4-/5 in left lower extremity.   5/5 in Rt UE and Rt LE with normal bulk, normal tone and no involuntary movements   SENSORY FUNCTION:  Intact light touch and pinprick in all 4 extremities    CEREBELLAR FUNCTION:  Intact fine motor control over right upper limb    STATION and GAIT  deferred          Data:    Lab Results:   CBC:   Recent Labs     07/31/20  0605 08/01/20  0915 08/02/20  0545   WBC 8.7 7.7 6.5   HGB 14.0 13.5 12.3*    165 134*     BMP:    Recent Labs     07/31/20  0605 08/01/20  0915 08/02/20  0545   * 126* 127*   K 3.6* 4.2 4.2   CL 93* 92* 93*   CO2 22 22 24   BUN 12 15 16   CREATININE 0.49* 0.48* 0.52*   GLUCOSE 99 108* 95         Lab Results   Component Value Date    CHOL 146 07/30/2020    LDLCHOLESTEROL 71 07/30/2020    HDL 65 07/30/2020    TRIG 48 07/30/2020    TSH 1.44 07/29/2020    INR 1.0 07/29/2020    LABA1C 5.2 07/30/2020           Impression and Plan: Mr. Emiliano Wilson is a 81 y.o. male with   Cervical Cord Compression with left hemiparesis; underwent surgical decompression this morning improvement in left-sided weakness  S/p C1-C2 laminectomy, C4-5 laminectomy with C5 left foraminotomy, instrumented fusion C4-5 (8/2/2020)    Visual hallucinations; possible sundowning vs morphine/ Roxicodone related; if remain refractory; may try quetiapine 12.5 mg this evening if ok with neurosurgery. Fall with posttraumatic subarachnoid hemorrhage: Follow-up CT head with no progression of bleed     Small Rt frontal infarction; continue ASA and Atorvastatin 20 mg  Comorbid Parkinson disease  Will follow with you.            Silva Miller MD 8/2/2020 7:46 PM

## 2020-08-02 NOTE — PROGRESS NOTES
Pre-op note    Patient examined. No changes except left deltoid 1/5, bicep 3/5,  4/5 strength. Risk, benefit, alternative, and all question were addressed    ANNIE Jean-Baptiste Favor, DO  Neurosurgeon

## 2020-08-02 NOTE — PLAN OF CARE
Problem: Skin Integrity:  Goal: Absence of new skin breakdown  Description: Absence of new skin breakdown  8/2/2020 0314 by Jersey Haines RN  Outcome: Met This Shift  8/1/2020 1411 by Aliza Mahmood RN  Outcome: Ongoing     Problem: Falls - Risk of:  Goal: Will remain free from falls  Description: Will remain free from falls  8/2/2020 0314 by Jersey Haines RN  Outcome: Met This Shift  8/1/2020 1411 by Aliza Mahmood RN  Outcome: Ongoing  Goal: Absence of physical injury  Description: Absence of physical injury  8/2/2020 0314 by Jersey Haines RN  Outcome: Met This Shift  8/1/2020 1411 by Aliza Mahmood RN  Outcome: Ongoing     Problem: HEMODYNAMIC STATUS  Goal: Patient has stable vital signs and fluid balance  8/2/2020 0314 by Jesrey Haines RN  Outcome: Met This Shift  8/1/2020 1411 by Aliza Mahmood RN  Outcome: Ongoing     Problem: Skin Integrity:  Goal: Will show no infection signs and symptoms  Description: Will show no infection signs and symptoms  8/2/2020 0314 by Jersey Haines RN  Outcome: Ongoing  8/1/2020 1411 by Aliza Mahmood RN  Outcome: Ongoing     Problem: Pain:  Goal: Pain level will decrease  Description: Pain level will decrease  8/2/2020 0314 by Jersey Haines RN  Outcome: Ongoing  8/1/2020 1411 by Aliza Mahmood RN  Outcome: Ongoing  Goal: Control of acute pain  Description: Control of acute pain  8/2/2020 0314 by Jersey Haines RN  Outcome: Ongoing  8/1/2020 1411 by Aliza Mahmood RN  Outcome: Ongoing  Goal: Control of chronic pain  Description: Control of chronic pain  8/2/2020 0314 by Jersey Haines RN  Outcome: Ongoing  8/1/2020 1411 by Aliza Mahmood RN  Outcome: Ongoing     Problem: ACTIVITY INTOLERANCE/IMPAIRED MOBILITY  Goal: Mobility/activity is maintained at optimum level for patient  8/2/2020 0314 by Jersey Haines RN  Outcome: Ongoing  8/1/2020 1411 by Aliza Mahmood RN  Outcome: Ongoing     Problem: COMMUNICATION IMPAIRMENT  Goal: Ability to express needs and understand communication  8/2/2020 0314 by Taye Bright RN  Outcome: Ongoing  Note: Patient was able to communicated well. He does have delayed responses but is able to speak and tell you what he needs.    Electronically signed by Taye Bright RN on 8/2/2020 at 3:15 AM      8/1/2020 1411 by Lesly Walton RN  Outcome: Ongoing

## 2020-08-02 NOTE — ANESTHESIA PRE PROCEDURE
Department of Anesthesiology  Preprocedure Note       Name:  Olivia Hill   Age:  81 y.o.  :  1935                                          MRN:  [id number]         Date:  2020      Surgeon: Yazmin Jara):  Vern Torres DO    Procedure: Procedure(s):  C1-C2 LAMINECTOMY, C4-C5 LAMINECTOMY WITH FUSION, HIRSCH, PRONE POSITIONING, POSSIBLE SCOPES, MEDTRONIC (REP NOTIFIED)    Medications prior to admission:   Prior to Admission medications    Medication Sig Start Date End Date Taking?  Authorizing Provider   carbidopa-levodopa (SINEMET)  MG per tablet Take 1 tablet by mouth 4 times daily Take 3 tablets at 6:30AM and at 10:30AM then take 2 tablets at 2:30PM and 6:30PM   Yes Historical Provider, MD   carbidopa-levodopa (SINEMET CR)  MG per extended release tablet Take 1 tablet by mouth 4 times daily    Yes Historical Provider, MD   carbidopa (LODOSYN) 25 MG TABS tablet Take by mouth 3 at 7, 3 at 11, 2 4 pm 2 at 7   Yes Historical Provider, MD   citalopram (CELEXA) 20 MG tablet Take 20 mg by mouth daily   Yes Historical Provider, MD   propranolol (INDERAL LA) 60 MG extended release capsule Take 60 mg by mouth 2 times daily    Yes Historical Provider, MD   finasteride (PROSCAR) 5 MG tablet Take 5 mg by mouth daily   Yes Historical Provider, MD   docusate sodium (COLACE) 100 MG capsule Take 100 mg by mouth 2 times daily   Yes Historical Provider, MD   primidone (MYSOLINE) 50 MG tablet Take 100 mg by mouth 2 times daily   Yes Historical Provider, MD   amantadine (SYMMETREL) 100 MG capsule Take 100 mg by mouth daily    Yes Historical Provider, MD   doxazosin (CARDURA) 2 MG tablet Take 2 mg by mouth nightly   Yes Historical Provider, MD   propranolol (INDERAL) 40 MG tablet Take 40 mg by mouth 2 times daily 60+40 BID   Yes Historical Provider, MD   atorvastatin (LIPITOR) 10 MG tablet Take 10 mg by mouth every other day    Yes Historical Provider, MD   ibuprofen (ADVIL;MOTRIN) 600 MG tablet Take 600 mg by mouth 2 times daily   Yes Historical Provider, MD       Current medications:    Current Facility-Administered Medications   Medication Dose Route Frequency Provider Last Rate Last Dose    sodium chloride tablet 1 g  1 g Oral TID  Jeannie Nunez, DO   1 g at 08/01/20 1603    carbidopa-levodopa (SINEMET CR)  MG per extended release tablet 1 tablet  1 tablet Oral 4x Daily Keene Kasandra, DO   1 tablet at 08/01/20 1950    carbidopa-levodopa (SINEMET)  MG per tablet 3 tablet  3 tablet Oral BID Keene Kasandra, DO   3 tablet at 08/01/20 1247    carbidopa-levodopa (SINEMET)  MG per tablet 2 tablet  2 tablet Oral BID Keene Kasandra, DO   2 tablet at 08/01/20 1950    doxazosin (CARDURA) tablet 2 mg  2 mg Oral Nightly AARON Gusman - CNP   2 mg at 08/01/20 1950    propranolol (INDERAL LA) extended release capsule 60 mg  60 mg Oral BID AARON Gusman - CNP   60 mg at 08/01/20 2132    propranolol (INDERAL) tablet 40 mg  40 mg Oral BID Phillip Larose APRN - CNP   40 mg at 08/01/20 2132    docusate sodium (COLACE) capsule 100 mg  100 mg Oral Daily AARON Gusman - CNP   100 mg at 08/01/20 7924    polyethylene glycol (GLYCOLAX) packet 17 g  17 g Oral Daily AARON Gusman - CNP   17 g at 07/31/20 1352    finasteride (PROSCAR) tablet 5 mg  5 mg Oral Daily AARON Gusman - CNP   5 mg at 08/01/20 2223    ibuprofen (ADVIL;MOTRIN) tablet 800 mg  800 mg Oral Q8H PRN Rhonda Mar DO   800 mg at 08/01/20 1842    butalbital-acetaminophen-caffeine (FIORICET, ESGIC) per tablet 1 tablet  1 tablet Oral TID PRN Brittany Fairbanks MD        citalopram (CELEXA) tablet 20 mg  20 mg Oral Daily Jeannie Nunez DO   20 mg at 08/01/20 8053    primidone (MYSOLINE) tablet 50 mg  50 mg Oral BID Jeannie Nunez DO   50 mg at 08/01/20 2132    amantadine (SYMMETREL) capsule 100 mg  100 mg Oral BID Jeannie Nunez DO   100 mg at 08/01/20 4188    labetalol (NORMODYNE;TRANDATE) injection syringe 10 mg  10 mg Intravenous Q4H PRN Brigitte Angela, DO   10 mg at 08/02/20 0459    atorvastatin (LIPITOR) tablet 20 mg  20 mg Oral Nightly Meg Davila MD   20 mg at 08/01/20 1950    sodium chloride flush 0.9 % injection 10 mL  10 mL Intravenous 2 times per day Emmanuel Ledesma MD   10 mL at 08/01/20 1950    sodium chloride flush 0.9 % injection 10 mL  10 mL Intravenous PRN Brigitte Angela, DO   10 mL at 08/02/20 0500    acetaminophen (TYLENOL) tablet 1,000 mg  1,000 mg Oral 3 times per day Brigitte Angela, DO   Stopped at 08/02/20 0600    methocarbamol (ROBAXIN) tablet 750 mg  750 mg Oral TID Brigitte Angela, DO   750 mg at 08/01/20 2133    senna (SENOKOT) tablet 8.6 mg  1 tablet Oral Daily Brigitte Angela, DO   8.6 mg at 08/01/20 9157    ondansetron (ZOFRAN) injection 4 mg  4 mg Intravenous Q6H PRN Brigitte Angela, DO           Allergies:  No Known Allergies    Problem List:    Patient Active Problem List   Diagnosis Code    Acute ischemic stroke ([facility] [address] I63.9    Subarachnoid hemorrhage (HCC) I60.9    Acute cerebral infarction (HCC) I63.9    Left hemiparesis (HCC) G81.94    Parkinson disease ([facility] [address] G20    Cerebrovascular accident (CVA) ([facility] [address] I63.9       Past Medical History:        Diagnosis Date    BPH (benign prostatic hyperplasia)     Cancer ([facility] [address]     basal cell carcinoma    Cervical spondylolysis     Depression     Hyperlipidemia     Hypertension     Neck pain     Neuropathy     Orofacial dyskinesia     Osteoarthritis     Parkinson's disease ([facility] [address]        Past Surgical History:        Procedure Laterality Date    JOINT REPLACEMENT         Social History:    Social History     Tobacco Use    Smoking status: Former Smoker    Smokeless tobacco: Never Used   Substance Use Topics    Alcohol use: Not on file                                Counseling given: Not Answered      Vital Signs (Current):   Vitals:    08/02/20 0339 08/02/20 6451 08/02/20 0612 08/02/20 0711   BP: (!) 178/81 (!) 150/75  (!) 159/92   Pulse: 63 64  68   Resp: 16 15  17   Temp: 97.4 °F (36.3 °C)   98.4 °F (36.9 °C)   TempSrc: Axillary   Oral   SpO2: 98%   96%   Weight:   164 lb 10.9 oz (74.7 kg)    Height:                                                  BP Readings from Last 3 Encounters:   08/02/20 (!) 159/92       NPO Status:                                                                                 BMI:   Wt Readings from Last 3 Encounters:   08/02/20 164 lb 10.9 oz (74.7 kg)     Body mass index is 26.58 kg/m². CBC:   Lab Results   Component Value Date    WBC 6.5 08/02/2020    RBC 3.90 08/02/2020    HGB 12.3 08/02/2020    HCT 37.3 08/02/2020    MCV 95.6 08/02/2020    RDW 13.8 08/02/2020     08/02/2020       CMP:   Lab Results   Component Value Date     08/02/2020    K 4.2 08/02/2020    CL 93 08/02/2020    CO2 24 08/02/2020    BUN 16 08/02/2020    CREATININE 0.52 08/02/2020    GFRAA >60 08/02/2020    LABGLOM >60 08/02/2020    GLUCOSE 95 08/02/2020    CALCIUM 8.5 08/02/2020       POC Tests: No results for input(s): POCGLU, POCNA, POCK, POCCL, POCBUN, POCHEMO, POCHCT in the last 72 hours.     Coags:   Lab Results   Component Value Date    PROTIME 10.7 07/29/2020    INR 1.0 07/29/2020    APTT 18.7 07/29/2020       HCG (If Applicable): No results found for: PREGTESTUR, PREGSERUM, HCG, HCGQUANT     ABGs: No results found for: PHART, PO2ART, XOU6ZCR, LLA0UWT, BEART, A5QVCLOD     Type & Screen (If Applicable):  No results found for: LABABO, LABRH    Drug/Infectious Status (If Applicable):  No results found for: HIV, HEPCAB    COVID-19 Screening (If Applicable):   Lab Results   Component Value Date    COVID19 Not Detected 08/01/2020         Anesthesia Evaluation    Airway: Mallampati: II     Neck ROM: limited   Dental: normal exam         Pulmonary:Negative Pulmonary ROS breath sounds clear to auscultation                             Cardiovascular:    (+)

## 2020-08-02 NOTE — PLAN OF CARE
Problem: Skin Integrity:  Goal: Will show no infection signs and symptoms  Description: Will show no infection signs and symptoms  8/2/2020 1637 by Krishna Bhakta RN  Outcome: Ongoing    Goal: Absence of new skin breakdown  Description: Absence of new skin breakdown  8/2/2020 1637 by Krishna Bhakta RN  Outcome: Ongoing    Problem: Falls - Risk of:  Goal: Will remain free from falls  Description: Will remain free from falls  8/2/2020 1637 by Krishna Bhakta RN  Outcome: Met This Shift    Goal: Absence of physical injury  Description: Absence of physical injury  8/2/2020 1637 by Krishna Bhakta RN  Outcome: Met This Shift    Problem: Pain:  Goal: Pain level will decrease  Description: Pain level will decrease  8/2/2020 1637 by Krishna Bhakta RN  Outcome: Ongoing    Goal: Control of acute pain  Description: Control of acute pain  8/2/2020 1637 by Krishna Bhakta RN  Outcome: Ongoing    Goal: Control of chronic pain  Description: Control of chronic pain  8/2/2020 1637 by Krishna Bhakta RN  Outcome: Ongoing     Problem: HEMODYNAMIC STATUS  Goal: Patient has stable vital signs and fluid balance  8/2/2020 1637 by Krishna Bhakta RN  Outcome: Ongoing

## 2020-08-02 NOTE — ANESTHESIA POSTPROCEDURE EVALUATION
Department of Anesthesiology  Postprocedure Note    Patient: Slava Anthony  MRN: [id number]  YOB: 1935  Date of evaluation: 8/2/2020  Time:  3:40 PM     Procedure Summary     Date:  08/02/20 Room / Location:  [address]    Anesthesia Start:  5950 Anesthesia Stop:  3474    Procedure:  C1-C2 LAMINECTOMY, C4-5 LAMINECTOMY WITH C5 LEFT FORAMINOTOMY, INSTRUMENTED FUSION C4-5 (N/A ) Diagnosis:  (ACUTE SPINAL CORD INJURY)    Surgeon:  Serina Vela DO Responsible Provider:  Paddy Fabian MD    Anesthesia Type:  general ASA Status:  4 - Emergent          Anesthesia Type: general    Erlin Phase I: Erlin Score: 8    Erlin Phase II:      Last vitals: Reviewed and per EMR flowsheets.        Anesthesia Post Evaluation    Patient location during evaluation: PACU  Patient participation: complete - patient participated  Level of consciousness: awake and alert  Pain score: 2  Airway patency: patent  Nausea & Vomiting: no nausea and no vomiting  Complications: no  Cardiovascular status: hemodynamically stable  Respiratory status: acceptable  Hydration status: euvolemic

## 2020-08-02 NOTE — PROGRESS NOTES
SpO2  Av.1 %  Min: 94 %  Max: 99 %  GENERAL: alert, no distress  NEURO: CN II-XI grossly intact, patient does have some decreased  strength in the left hand.    Patient has a pill-rolling tremor in right hand, left sided facial droop still present, left upper extremity, left lower extremity 3/5 strength. Right upper extremity, right lower extremity 5/5 strength  HEENT: Normocephalic, atraumatic  LUNGS: clear to ausculation, without wheezes, rales or rhonci  HEART: normal rate and regular rhythm  ABDOMEN: soft, non-tender, non-distended, bowel sounds present in all 4 quadrants and no guarding or peritoneal signs present  EXTREMITY:  No edema, cyanosis, deformity noted    I/O last 3 completed shifts: In: 260 [P.O.:240; I.V.:20]  Out: 0     Drain/tube output:  In: 260 [P.O.:240; I.V.:20]  Out: 0     LAB:  CBC:   Recent Labs     20  0915 20  0545   WBC 8.7 7.7 6.5   HGB 14.0 13.5 12.3*   HCT 43.0 40.1* 37.3*   MCV 96.6 95.5 95.6    165 134*     BMP:   Recent Labs     20  0915 20  0545   * 126* 127*   K 3.6* 4.2 4.2   CL 93* 92* 93*   CO2 22 22 24   BUN 12 15 16   CREATININE 0.49* 0.48* 0.52*   GLUCOSE 99 108* 95     COAGS: No results for input(s): APTT, PROT, INR in the last 72 hours. RADIOLOGY:  No new studies      Theresa Ureña DO  20, 7:20 AM         Attending Note      I have reviewed the above [facility] note(s) and I either performed the key elements of the medical history and physical exam or was present with the trauma resident when the key elements of the medical history and physical exam were performed. I have discussed the findings, established the care plan and recommendations with the trauma team.  For NS intervention to cervical spine. Labs reviewed. Will post op check.     Austen Anderson MD  2020  7:56 AM

## 2020-08-02 NOTE — BRIEF OP NOTE
Brief Postoperative Note      Patient: Joseph Kearney  YOB: 1935  MRN: [id number]    Date of Procedure: 8/2/2020    Pre-Op Diagnosis: ACUTE SPINAL CORD INJURY, cervical stenosis, C4/5 spondylolisthesis    Post-Op Diagnosis: Same       Procedure(s):  C1-C2 LAMINECTOMY, C4-5 LAMINECTOMY WITH C5 LEFT FORAMINOTOMY, INSTRUMENTED FUSION C4-5    Surgeon(s):  Kenton Rush DO    Assistant:  First Assistant: Mildred Dinh    Anesthesia: General    Estimated Blood Loss (mL): 142    Complications: None    Specimens:   ID Type Source Tests Collected by Time Destination   1 : URINE FROM INITIAL TAM INSERTION Urine Urine, indwelling catheter CULTURE, URINE Dulce Townsend RN 8/2/2020 1110        Implants:  Implant Name Type Inv. Item Serial No.  Lot No. LRB No. Used Action   KIT SEALANT SURGIFLO HEMOSTATIC MATRIX Bone/Graft/Tissue/Human/Synth KIT SEALANT SURGIFLO HEMOSTATIC MATRIX  [place]: [employer]  N/A 1 Implanted   MERCEDES-VIABLE BONE MATRIX FIBERCELL 5CC - S139 Bone/Graft/Tissue/Human/Synth MERCEDES-VIABLE BONE MATRIX FIBERCELL 5CC [facility] [id number] N/A 1 Implanted   SCREW MULTI AXIAL 3.5X12MM Spine SCREW MULTI AXIAL 3.5X12MM  [place]  N/A 4 Implanted   IMPL SPINE FARRAH PRE-CUT 3.5X25MM Spine IMPL SPINE FARRAH PRE-CUT 3.5X25MM  [place]  N/A 2 Implanted   SCREW LK INFINITY TI SET Spine SCREW LK INFINITY TI SET  [place]  N/A 4 Implanted         Drains:   Closed/Suction Drain Midline Back Accordion 10 [place] (Active)       [REMOVED] External Urinary Catheter (Removed)   Catheter changed  Yes 07/31/20 0557   Urine Color Yellow 07/31/20 1600   Urine Appearance Clear 07/31/20 1600   Urine Odor Malodorous 07/31/20 0557   Output (mL) 500 mL 08/01/20 0000   Placement Replaced 08/01/20 0000   Skin Assessment No Injury 07/31/20 1600       Findings: C1/2 cord compression with hypertrophied ligamentous flavum.  Left C4/5 forminal stenosis     Electronically signed by Kenton Rush DO on 8/2/2020 at 1:11 PM

## 2020-08-02 NOTE — PROGRESS NOTES
POST OP NOTE    SUBJECTIVE  Pt s/p C1-C2 laminectomy, C4-5 laminectomy with C5 left foraminotomy, instrumented fusion C4-5 day #0. Patient seen and examined, he is resting comfortably upon entry to the room. He is easily arousable. OBJECTIVE  VITALS:  BP (!) 182/84   Pulse 81   Temp 97.9 °F (36.6 °C) (Axillary)   Resp 17   Ht 5' 6\" (1.676 m)   Wt 164 lb 10.9 oz (74.7 kg)   SpO2 99%   BMI 26.58 kg/m²         GENERAL: Sleeping but easily arousable, No acute distress  CARDIOVASCULAR:  regular rate and rhythm   LUNGS:  CTA Bilaterally  ABDOMEN:   Abdomen soft, non-tender, non-distended  INCISION: Incision clean/dry/intact, accordion drain in place draining blood. ASSESSMENT  1. C1-C2 laminectomy, C4-5 laminectomy with C5 left foraminotomy, instrumented fusion C4-5    PLAN  1. Pain management-patient is having some increased pain post surgery. Opted for one-time dose IV fentanyl over p.o. medication due to patient being somewhat sleepy  2. DVT proph-Ok to resume 8/4  3. GI proph-General diet   Encouraged patient to supplement diet with ensures   Encouraged patient to add salt to food, and take salt tablets.   4.  Plan to follow-up postop SOWMYA Elizabeth DO  Trauma/Surgery Service  8/2/2020 at 4:16 PM

## 2020-08-03 ENCOUNTER — APPOINTMENT (OUTPATIENT)
Dept: CT IMAGING | Age: 85
DRG: 957 | End: 2020-08-03
Payer: COMMERCIAL

## 2020-08-03 LAB
ABSOLUTE EOS #: 0 K/UL (ref 0–0.44)
ABSOLUTE IMMATURE GRANULOCYTE: 0 K/UL (ref 0–0.3)
ABSOLUTE LYMPH #: 0.96 K/UL (ref 1.1–3.7)
ABSOLUTE MONO #: 1.04 K/UL (ref 0.1–1.2)
ANION GAP SERPL CALCULATED.3IONS-SCNC: 34 MMOL/L (ref 9–17)
BASOPHILS # BLD: 1 % (ref 0–2)
BASOPHILS ABSOLUTE: 0.07 K/UL (ref 0–0.2)
BUN BLDV-MCNC: 18 MG/DL (ref 8–23)
BUN/CREAT BLD: ABNORMAL (ref 9–20)
CALCIUM SERPL-MCNC: 7.8 MG/DL (ref 8.6–10.4)
CHLORIDE BLD-SCNC: 90 MMOL/L (ref 98–107)
CO2: 18 MMOL/L (ref 20–31)
CREAT SERPL-MCNC: 0.47 MG/DL (ref 0.7–1.2)
CULTURE: ABNORMAL
DIFFERENTIAL TYPE: ABNORMAL
EOSINOPHILS RELATIVE PERCENT: 0 % (ref 1–4)
GFR AFRICAN AMERICAN: >60 ML/MIN
GFR NON-AFRICAN AMERICAN: >60 ML/MIN
GFR SERPL CREATININE-BSD FRML MDRD: ABNORMAL ML/MIN/{1.73_M2}
GFR SERPL CREATININE-BSD FRML MDRD: ABNORMAL ML/MIN/{1.73_M2}
GLUCOSE BLD-MCNC: 94 MG/DL (ref 70–99)
HCT VFR BLD CALC: 30.4 % (ref 40.7–50.3)
HEMOGLOBIN: 9.4 G/DL (ref 13–17)
IMMATURE GRANULOCYTES: 0 %
INR BLD: 1
LYMPHOCYTES # BLD: 13 % (ref 24–43)
Lab: ABNORMAL
MCH RBC QN AUTO: 34.1 PG (ref 25.2–33.5)
MCHC RBC AUTO-ENTMCNC: 30.9 G/DL (ref 28.4–34.8)
MCV RBC AUTO: 110.1 FL (ref 82.6–102.9)
MONOCYTES # BLD: 14 % (ref 3–12)
MORPHOLOGY: ABNORMAL
NRBC AUTOMATED: 1.6 PER 100 WBC
OSMOLALITY URINE: 412 MOSM/KG (ref 80–1300)
PARTIAL THROMBOPLASTIN TIME: 21.5 SEC (ref 20.5–30.5)
PDW BLD-RTO: 14.4 % (ref 11.8–14.4)
PLATELET # BLD: 181 K/UL (ref 138–453)
PLATELET ESTIMATE: ABNORMAL
PMV BLD AUTO: 11.1 FL (ref 8.1–13.5)
POTASSIUM SERPL-SCNC: 4.1 MMOL/L (ref 3.7–5.3)
PROTHROMBIN TIME: 10.2 SEC (ref 9–12)
RBC # BLD: 2.76 M/UL (ref 4.21–5.77)
RBC # BLD: ABNORMAL 10*6/UL
SEG NEUTROPHILS: 72 % (ref 36–65)
SEGMENTED NEUTROPHILS ABSOLUTE COUNT: 5.33 K/UL (ref 1.5–8.1)
SERUM OSMOLALITY: 280 MOSM/KG (ref 275–295)
SODIUM BLD-SCNC: 142 MMOL/L (ref 135–144)
SODIUM,UR: 51 MMOL/L
SPECIMEN DESCRIPTION: ABNORMAL
VITAMIN B-12: 416 PG/ML (ref 232–1245)
WBC # BLD: 7.4 K/UL (ref 3.5–11.3)
WBC # BLD: ABNORMAL 10*3/UL

## 2020-08-03 PROCEDURE — 99232 SBSQ HOSP IP/OBS MODERATE 35: CPT | Performed by: PSYCHIATRY & NEUROLOGY

## 2020-08-03 PROCEDURE — 97110 THERAPEUTIC EXERCISES: CPT

## 2020-08-03 PROCEDURE — 85610 PROTHROMBIN TIME: CPT

## 2020-08-03 PROCEDURE — 6360000002 HC RX W HCPCS: Performed by: PHYSICIAN ASSISTANT

## 2020-08-03 PROCEDURE — 2060000000 HC ICU INTERMEDIATE R&B

## 2020-08-03 PROCEDURE — 2580000003 HC RX 258: Performed by: PHYSICIAN ASSISTANT

## 2020-08-03 PROCEDURE — 97530 THERAPEUTIC ACTIVITIES: CPT

## 2020-08-03 PROCEDURE — 97129 THER IVNTJ 1ST 15 MIN: CPT

## 2020-08-03 PROCEDURE — 6370000000 HC RX 637 (ALT 250 FOR IP): Performed by: PHYSICIAN ASSISTANT

## 2020-08-03 PROCEDURE — 80048 BASIC METABOLIC PNL TOTAL CA: CPT

## 2020-08-03 PROCEDURE — 2580000003 HC RX 258: Performed by: STUDENT IN AN ORGANIZED HEALTH CARE EDUCATION/TRAINING PROGRAM

## 2020-08-03 PROCEDURE — 83930 ASSAY OF BLOOD OSMOLALITY: CPT

## 2020-08-03 PROCEDURE — 83935 ASSAY OF URINE OSMOLALITY: CPT

## 2020-08-03 PROCEDURE — 84300 ASSAY OF URINE SODIUM: CPT

## 2020-08-03 PROCEDURE — 85025 COMPLETE CBC W/AUTO DIFF WBC: CPT

## 2020-08-03 PROCEDURE — 82607 VITAMIN B-12: CPT

## 2020-08-03 PROCEDURE — 72125 CT NECK SPINE W/O DYE: CPT

## 2020-08-03 PROCEDURE — APPSS30 APP SPLIT SHARED TIME 16-30 MINUTES: Performed by: PHYSICIAN ASSISTANT

## 2020-08-03 PROCEDURE — 97535 SELF CARE MNGMENT TRAINING: CPT

## 2020-08-03 PROCEDURE — 85730 THROMBOPLASTIN TIME PARTIAL: CPT

## 2020-08-03 PROCEDURE — 92507 TX SP LANG VOICE COMM INDIV: CPT

## 2020-08-03 PROCEDURE — 36415 COLL VENOUS BLD VENIPUNCTURE: CPT

## 2020-08-03 PROCEDURE — 6360000002 HC RX W HCPCS: Performed by: STUDENT IN AN ORGANIZED HEALTH CARE EDUCATION/TRAINING PROGRAM

## 2020-08-03 PROCEDURE — 97164 PT RE-EVAL EST PLAN CARE: CPT

## 2020-08-03 RX ORDER — OXYCODONE HYDROCHLORIDE 5 MG/1
10 TABLET ORAL EVERY 4 HOURS PRN
Status: DISCONTINUED | OUTPATIENT
Start: 2020-08-03 | End: 2020-08-06 | Stop reason: HOSPADM

## 2020-08-03 RX ORDER — OXYCODONE HYDROCHLORIDE 5 MG/1
5 TABLET ORAL EVERY 4 HOURS PRN
Status: DISCONTINUED | OUTPATIENT
Start: 2020-08-03 | End: 2020-08-06 | Stop reason: HOSPADM

## 2020-08-03 RX ADMIN — PROPRANOLOL HYDROCHLORIDE 60 MG: 60 CAPSULE, EXTENDED RELEASE ORAL at 20:51

## 2020-08-03 RX ADMIN — METHOCARBAMOL TABLETS 750 MG: 750 TABLET, COATED ORAL at 14:34

## 2020-08-03 RX ADMIN — SENNOSIDES 8.6 MG: 8.6 TABLET, FILM COATED ORAL at 09:42

## 2020-08-03 RX ADMIN — DOXAZOSIN 2 MG: 2 TABLET ORAL at 20:48

## 2020-08-03 RX ADMIN — PROPRANOLOL HYDROCHLORIDE 60 MG: 60 CAPSULE, EXTENDED RELEASE ORAL at 09:37

## 2020-08-03 RX ADMIN — PRIMIDONE 50 MG: 50 TABLET ORAL at 20:48

## 2020-08-03 RX ADMIN — CARBIDOPA AND LEVODOPA 1 TABLET: 25; 100 TABLET, EXTENDED RELEASE ORAL at 14:34

## 2020-08-03 RX ADMIN — CARBIDOPA AND LEVODOPA 2 TABLET: 25; 100 TABLET ORAL at 17:54

## 2020-08-03 RX ADMIN — PRIMIDONE 50 MG: 50 TABLET ORAL at 09:33

## 2020-08-03 RX ADMIN — VANCOMYCIN HYDROCHLORIDE 1250 MG: 10 INJECTION, POWDER, LYOPHILIZED, FOR SOLUTION INTRAVENOUS at 11:35

## 2020-08-03 RX ADMIN — ACETAMINOPHEN 1000 MG: 500 TABLET ORAL at 14:34

## 2020-08-03 RX ADMIN — ATORVASTATIN CALCIUM 20 MG: 20 TABLET, FILM COATED ORAL at 20:48

## 2020-08-03 RX ADMIN — SODIUM CHLORIDE, PRESERVATIVE FREE 10 ML: 5 INJECTION INTRAVENOUS at 09:43

## 2020-08-03 RX ADMIN — METHOCARBAMOL TABLETS 750 MG: 750 TABLET, COATED ORAL at 20:48

## 2020-08-03 RX ADMIN — SODIUM CHLORIDE TAB 1 GM 1 G: 1 TAB at 11:41

## 2020-08-03 RX ADMIN — CARBIDOPA AND LEVODOPA 1 TABLET: 25; 100 TABLET, EXTENDED RELEASE ORAL at 04:54

## 2020-08-03 RX ADMIN — METHOCARBAMOL TABLETS 750 MG: 750 TABLET, COATED ORAL at 09:28

## 2020-08-03 RX ADMIN — OXYCODONE HYDROCHLORIDE 5 MG: 5 TABLET ORAL at 23:50

## 2020-08-03 RX ADMIN — Medication 10 ML: at 09:32

## 2020-08-03 RX ADMIN — STANDARDIZED SENNA CONCENTRATE AND DOCUSATE SODIUM 1 TABLET: 8.6; 5 TABLET ORAL at 09:33

## 2020-08-03 RX ADMIN — CARBIDOPA AND LEVODOPA 2 TABLET: 25; 100 TABLET ORAL at 14:35

## 2020-08-03 RX ADMIN — Medication 10 ML: at 20:49

## 2020-08-03 RX ADMIN — SODIUM CHLORIDE, PRESERVATIVE FREE 10 ML: 5 INJECTION INTRAVENOUS at 20:50

## 2020-08-03 RX ADMIN — SODIUM CHLORIDE TAB 1 GM 1 G: 1 TAB at 09:30

## 2020-08-03 RX ADMIN — CARBIDOPA AND LEVODOPA 1 TABLET: 25; 100 TABLET, EXTENDED RELEASE ORAL at 17:53

## 2020-08-03 RX ADMIN — PROPRANOLOL HYDROCHLORIDE 40 MG: 40 TABLET ORAL at 20:48

## 2020-08-03 RX ADMIN — FINASTERIDE 5 MG: 5 TABLET, FILM COATED ORAL at 09:33

## 2020-08-03 RX ADMIN — OXYCODONE HYDROCHLORIDE 10 MG: 5 TABLET ORAL at 11:59

## 2020-08-03 RX ADMIN — CARBIDOPA AND LEVODOPA 1 TABLET: 25; 100 TABLET, EXTENDED RELEASE ORAL at 11:48

## 2020-08-03 RX ADMIN — ACETAMINOPHEN 1000 MG: 500 TABLET ORAL at 20:47

## 2020-08-03 RX ADMIN — CEFAZOLIN 2 G: 10 INJECTION, POWDER, FOR SOLUTION INTRAVENOUS at 03:40

## 2020-08-03 RX ADMIN — SODIUM CHLORIDE TAB 1 GM 1 G: 1 TAB at 17:54

## 2020-08-03 RX ADMIN — CARBIDOPA AND LEVODOPA 3 TABLET: 25; 100 TABLET ORAL at 11:37

## 2020-08-03 RX ADMIN — STANDARDIZED SENNA CONCENTRATE AND DOCUSATE SODIUM 1 TABLET: 8.6; 5 TABLET ORAL at 20:47

## 2020-08-03 RX ADMIN — AMANTADINE HYDROCHLORIDE 100 MG: 100 CAPSULE ORAL at 09:34

## 2020-08-03 RX ADMIN — CARBIDOPA AND LEVODOPA 3 TABLET: 25; 100 TABLET ORAL at 09:29

## 2020-08-03 RX ADMIN — POLYETHYLENE GLYCOL 3350 17 G: 17 POWDER, FOR SOLUTION ORAL at 09:28

## 2020-08-03 RX ADMIN — AMANTADINE HYDROCHLORIDE 100 MG: 100 CAPSULE ORAL at 20:47

## 2020-08-03 RX ADMIN — PROPRANOLOL HYDROCHLORIDE 40 MG: 40 TABLET ORAL at 09:33

## 2020-08-03 RX ADMIN — DOCUSATE SODIUM 100 MG: 100 CAPSULE, LIQUID FILLED ORAL at 09:36

## 2020-08-03 RX ADMIN — ACETAMINOPHEN 1000 MG: 500 TABLET ORAL at 04:56

## 2020-08-03 RX ADMIN — CITALOPRAM 20 MG: 20 TABLET, FILM COATED ORAL at 09:33

## 2020-08-03 ASSESSMENT — PAIN DESCRIPTION - PAIN TYPE
TYPE: ACUTE PAIN;SURGICAL PAIN
TYPE: ACUTE PAIN

## 2020-08-03 ASSESSMENT — PAIN DESCRIPTION - DESCRIPTORS
DESCRIPTORS: STABBING;DISCOMFORT
DESCRIPTORS: DISCOMFORT
DESCRIPTORS: STABBING;DISCOMFORT

## 2020-08-03 ASSESSMENT — PAIN DESCRIPTION - LOCATION
LOCATION: NECK

## 2020-08-03 ASSESSMENT — PAIN SCALES - GENERAL
PAINLEVEL_OUTOF10: 6
PAINLEVEL_OUTOF10: 8
PAINLEVEL_OUTOF10: 6
PAINLEVEL_OUTOF10: 10
PAINLEVEL_OUTOF10: 6
PAINLEVEL_OUTOF10: 6
PAINLEVEL_OUTOF10: 5
PAINLEVEL_OUTOF10: 8

## 2020-08-03 ASSESSMENT — PAIN DESCRIPTION - FREQUENCY
FREQUENCY: CONTINUOUS
FREQUENCY: CONTINUOUS

## 2020-08-03 ASSESSMENT — PAIN DESCRIPTION - ONSET
ONSET: ON-GOING
ONSET: ON-GOING

## 2020-08-03 NOTE — PROGRESS NOTES
Physical Therapy    Facility/Department: [facility] NEURO  Re-Evaluation Assessment    NAME: Say Delgadillo  : 1935  MRN: [id number]    Date of Service: 8/3/2020   S/p C1-2, C4-5 laminectomy with C5 left foraminotomy, intrumented fusion C4-5 (20)  Discharge Recommendations:  Patient would benefit from continued therapy after discharge   PT Equipment Recommendations  Equipment Needed: (Continue to assess pending progression of mobility)    Assessment   Body structures, Functions, Activity limitations: Decreased functional mobility ; Decreased endurance;Decreased balance;Decreased strength;Decreased posture;Decreased ADL status; Increased pain  Assessment: Pt required maxAx2 to perform bed mobility, maxA to sit EOB once established. Pt demonstrates significant L side weakness throughout evaluation limiting tolerance to functional mobility. Pt is a high fal risk and would be unsafe to perfom functional mobility without skilled assistance. Recommending continued skilled physical therapy to address high level balance and return pt to prior level of function. Prognosis: Fair  PT Education: General Safety; Functional Mobility Training;Home Exercise Program;Transfer Training  Patient Education: C-spine precautions  REQUIRES PT FOLLOW UP: Yes  Activity Tolerance  Activity Tolerance: Patient limited by fatigue;Patient limited by pain       Patient Diagnosis(es): The encounter diagnosis was Cerebrovascular accident (CVA), unspecified mechanism ([address] has a past medical history of BPH (benign prostatic hyperplasia), Cancer ([address] Cervical spondylolysis, Depression, Hyperlipidemia, Hypertension, Neck pain, Neuropathy, Orofacial dyskinesia, Osteoarthritis, and Parkinson's disease ([address] has a past surgical history that includes joint replacement; cervical fusion (2020); cervical laminectomy (2020); and cervical fusion (N/A, 2020).     Restrictions  Restrictions/Precautions  Restrictions/Precautions: Fall Risk, General Precautions  Required Braces or Orthoses?: Yes  Required Braces or Orthoses  Cervical: c-collar(hard c-collar at all times)  Position Activity Restriction  Other position/activity restrictions: up with assist; act as tolerated; up w/assist  Vision/Hearing  Vision: Impaired  Vision Exceptions: Wears glasses for reading  Hearing: Within functional limits     Subjective  General  Patient assessed for rehabilitation services?: Yes  Response To Previous Treatment: Not applicable  Family / Caregiver Present: No  Follows Commands: Within Functional Limits  Subjective  Subjective: RN and pt in agreement for PT re-eval; OT in room with pt upon writer's arrival, pt pleasant and cooperative throughout session. PT re-evaluation performed to re-establish goals following surgical intervention.    Pain Screening  Patient Currently in Pain: Yes  Pain Assessment  Pain Assessment: 0-10  Pain Level: 8  Pain Type: Acute pain;Surgical pain  Pain Location: Neck  Pain Descriptors: Discomfort  Response to Pain Intervention: Patient Satisfied  Multiple Pain Sites: No  Vital Signs  Patient Currently in Pain: Yes       Orientation  Orientation  Overall Orientation Status: Impaired  Orientation Level: Disoriented to situation;Disoriented to place;Oriented to person;Oriented to time  Social/Functional History  Social/Functional History  Lives With: Alone  Type of Home: Apartment  Home Layout: One level  Home Access: Stairs to enter with rails  Entrance Stairs - Number of Steps: 5  Entrance Stairs - Rails: Both  Bathroom Shower/Tub: Tub/Shower unit, Shower chair with back  Bathroom Toilet: Standard  Home Equipment: Rolling walker  ADL Assistance: Needs assistance(Pt reports requiring assistance with transfering in/out of the tub and lower body grooming from sons)  Homemaking Assistance: Needs assistance(daughter performs cooking, cleaning and laundry)  Homemaking Responsibilities: No  Ambulation Assistance: Independent(pt reports independently ambulating with RW at baseline)  Transfer Assistance: Needs assistance  Active : No  Mode of Transportation: Family  Occupation: Retired  Leisure & Hobbies: Watching TV  Additional Comments: Pt reports daughter stops over 2x/week and son's intermittently. Pt receives meals on wheels 5x/week  Cognition   Cognition  Overall Cognitive Status: Exceptions  Arousal/Alertness: Delayed responses to stimuli  Following Commands: Follows one step commands with increased time; Follows one step commands with repetition; Inconsistently follows commands; Does not follow commands  Attention Span: Attends with cues to redirect  Problem Solving: Decreased awareness of errors;Assistance required to generate solutions;Assistance required to identify errors made;Assistance required to implement solutions;Assistance required to correct errors made  Insights: Decreased awareness of deficits  Initiation: Requires cues for all  Sequencing: Requires cues for all    Objective  Strength RLE  Strength RLE: Exception  Comment: Grossly 3+/5 at hip, knee, and ankle  Strength LLE  Strength LLE: Exception  Comment: Grossly 2-/5 at hip, knee grossly 2-/5; ankle 2/5  Strength RUE  Strength RUE: Exception  Comment: Grossly 3+/5 at shoulder, elbow, wrist/hand  Strength LUE  Strength LUE: Exception  Comment: Grossly 1/5 at hand/wrist, elbow, and shoulder  Motor Control  Gross Motor?: WFL  Sensation  Overall Sensation Status: WFL(pt denies numbness/tingling)  Bed mobility  Supine to Sit: Maximum assistance;2 Person assistance  Sit to Supine: Maximum assistance;2 Person assistance  Comment: HOB elevated. Pt demonstrates significant difficulty with bilateral LE progression and trunk progression this date.   Transfers  Comment: unable to safely attempt due to pt's poor sitting tolerance  Ambulation  Ambulation?: No     Balance  Posture: Fair  Sitting - Static: Fair  Sitting - Dynamic: Poor;-  Comments: Pt required maxA to sit EOB ~5 minutes; unable to attempt standing this date due to pt's signifcant fatigue and posterior lean. Plan   Plan  Times per week: 5-6x/week  Current Treatment Recommendations: Strengthening, Transfer Training, Endurance Training, Patient/Caregiver Education & Training, ROM, Balance Training, Gait Training, Home Exercise Program, Functional Mobility Training, Stair training, Safety Education & Training  Safety Devices  Type of devices: All fall risk precautions in place, Call light within reach, Patient at risk for falls, Nurse notified, Bed alarm in place, Left in bed(RN present upon writer's exit)  Restraints  Initially in place: No  AM-PAC Score     AM-PAC Inpatient Mobility without Stair Climbing Raw Score : 8 (08/03/20 1508)  AM-PAC Inpatient without Stair Climbing T-Scale Score : 30.65 (08/03/20 1508)  Mobility Inpatient CMS 0-100% Score: 80.91 (08/03/20 1508)  Mobility Inpatient without Stair CMS G-Code Modifier : CM (08/03/20 1508)       Goals  Short term goals  Time Frame for Short term goals: 14  Short term goal 1: Pt to perform bed mobility modA  Short term goal 2: Demonstrate functional transfers [id number] term goal 3: Pt to demonstrate static sitting balance of fair- to decrease fall risk and maximize independence  Short term goal 4: Tolerate 30 minutes of therapy to address endurance deficits and return pt to prior level of independence  Patient Goals   Patient goals :  To feel better       Therapy Time   Individual Concurrent Group Co-treatment   Time In 1417         Time Out 1440         Minutes 23         Timed Code Treatment Minutes: 8 Minutes(co-treat with CAMPOS due to level of assistance required)       Terry Sutherland, PT

## 2020-08-03 NOTE — PROGRESS NOTES
Occupational Therapy  Facility/Department: [facility] NEURO  Daily Treatment Note  NAME: Joseph Kearney  : 1935  MRN: [id number]    Date of Service: 8/3/2020    Discharge Recommendations:  Patient would benefit from continued therapy after discharge. Assessment   Performance deficits / Impairments: Decreased safe awareness;Decreased balance;Decreased functional mobility ; Decreased ADL status; Decreased cognition;Decreased high-level IADLs;Decreased endurance;Decreased strength;Decreased ROM; Decreased coordination;Decreased fine motor control;Decreased sensation;Decreased posture  Prognosis: Fair  OT Education: OT Role;Transfer Training;Plan of Care;ADL Adaptive Strategies  Patient Education: purpose of OT; proper hand and foot placement; importance of activity; ROM  Barriers to Learning: pt demo P carry over req increased assist from [address]: Yes  Activity Tolerance  Activity Tolerance: Patient limited by fatigue;Patient limited by pain  Safety Devices  Safety Devices in place: Yes  Type of devices: Patient at risk for falls; Left in bed;Call light within reach; Bed alarm in place;Nurse notified         Patient Diagnosis(es): The encounter diagnosis was Cerebrovascular accident (CVA), unspecified mechanism ([address] has a past medical history of BPH (benign prostatic hyperplasia), Cancer ([address] Cervical spondylolysis, Depression, Hyperlipidemia, Hypertension, Neck pain, Neuropathy, Orofacial dyskinesia, Osteoarthritis, and Parkinson's disease ([address] has a past surgical history that includes joint replacement; cervical fusion (2020); cervical laminectomy (2020); and cervical fusion (N/A, 2020).     Restrictions  Restrictions/Precautions  Restrictions/Precautions: Fall Risk, General Precautions  Required Braces or Orthoses?: Yes  Required Braces or Orthoses  Cervical: c-collar(hard c-collar at all times)  Position Activity Restriction  Other position/activity restrictions: up with assist  Subjective   General  Chart Reviewed: Yes  Patient assessed for rehabilitation services?: Yes  Family / Caregiver Present: No  General Comment  Comments: pt supine in bed at start of session req in bed activities sec to pain. At session end pt retired to supine in bed  Pain Assessment  Pain Assessment: 0-10  Pain Level: 8  Pain Type: Acute pain  Pain Location: Neck  Non-Pharmaceutical Pain Intervention(s): Distraction; Therapeutic presence;Repositioned  Vital Signs  Patient Currently in Pain: Yes  Oxygen Therapy  SpO2: 97 %  O2 Device: None (Room air)   Orientation  Orientation  Overall Orientation Status: Within Functional Limits  Objective    ADL  Grooming: Maximum assistance;Setup;Verbal cueing; Increased time to complete(oral care and grooming completed supine in bed with HOB elevated pt req [place] assist and increased CAMPOS assist throughout session)  UE Dressing: Maximum assistance;Setup;Verbal cueing; Increased time to complete(to doff/don gown pt able to assist with lifting L arm however unable to assist threading)  Additional Comments: ADLs completed supine in bed sec to pain pain pill adminstered prior to session per RN  Balance  Sitting Balance: Maximum assistance(max A utilizing RUE for support at bed rail increasing to dependent sec to increased fatigue)  Standing Balance  Comment: CHARAN pt not appropriate sec to P sitting balance  Bed mobility  Supine to Sit: Maximum assistance;2 Person assistance  Sit to Supine: Maximum assistance;2 Person assistance  Scooting: Maximal assistance  Transfers  Transfer Comments: CHARAN sec to pt P sitting balance  Cognition  Overall Cognitive Status: Exceptions  Arousal/Alertness: Delayed responses to stimuli  Following Commands: Follows one step commands with increased time; Follows one step commands with repetition; Inconsistently follows commands; Does not follow commands  Attention Span: Attends with cues to redirect  Problem Solving: Decreased awareness of errors;Assistance required to generate solutions;Assistance required to identify errors made;Assistance required to implement solutions;Assistance required to correct errors made  Insights: Decreased awareness of deficits  Initiation: Requires cues for all  Sequencing: Requires cues for all     Pt and RN agreeable to therapy this day. PROM excercises of shoulder flexion/extension, elbow flexion/extension, wrist flexion/extension, and finger flexion extension completed (5 reps, 1 set) pt unable to assist in order to increase ROM for ADLs. ADLs completed in bed sec to pain. Pt sat at EOB approx 5 min dynamic and static req increased assist for balance maintaince. At session end pt supine in bed with BLE elevated and LUE elevated. Call light in reach, bed alarm on and RN present. Plan   Plan  Times per week: 3-5 x/wk  Current Treatment Recommendations: Balance Training, Functional Mobility Training, Endurance Training, Strengthening, ROM, Neuromuscular Re-education, Cognitive Reorientation, Pain Management, Safety Education & Training, Equipment Evaluation, Education, & procurement, Self-Care / ADL, Patient/Caregiver Education & Training   Cont POC    Goals  Short term goals  Time Frame for Short term goals: By discharge, pt will:  Short term goal 1: Demo Min A for bed mobility to increase safety and independence with ADLs  Short term goal 2: Demo Min A for UB ADLs and grooming tasks with setup and Min VCs  Short term goal 3: Demo Min A for LB ADLs and toileting tasks with setup and Min VCs  Short term goal 4: Demo 10+ minutes dynamic sitting task to increase independence with ADLs/IADLs  Short term goal 5: Demo AAROM to LUE to increase functional use for ADL/IADL performance  Short term goal 6: demo mod Ax2 for functional transfers/ mobility with LRD and good safety awareness during functional activities (goal added by ROLAND Gray 8/1/2020)       Therapy Time   Individual Concurrent Group Co-treatment   Time In [facility] [address]

## 2020-08-03 NOTE — OP NOTE
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    Post-Op Diagnosis: Same       Procedure(s):  C1-C2 LAMINECTOMY, C4-5 LAMINECTOMY WITH C5 LEFT FORAMINOTOMY, INSTRUMENTED FUSION C4-5  C4-C5 segmental fixation  Use of autologous morselized graft  Use of morselized allograft  Microsurgical technique with use of microscope  Use of fluoroscopy      Surgeon(s):  Julane Brunner, DO    Assistant:   First Assistant: Mildred Dinh    Anesthesia: General    Estimated Blood Loss (mL): 974    Complications: None    Specimens:   ID Type Source Tests Collected by Time Destination   1 : URINE FROM INITIAL TAM INSERTION Urine Urine, indwelling catheter CULTURE, URINE Francisca Biggs RN 8/2/2020 1110        Implants:  Implant Name Type Inv. Item Serial No.  Lot No. LRB No. Used Action   KIT SEALANT SURGIFLO HEMOSTATIC MATRIX Bone/Graft/Tissue/Human/Synth KIT SEALANT SURGIFLO HEMOSTATIC MATRIX  [place]: [employer]  N/A 1 Implanted   MERCEDES-VIABLE BONE MATRIX FIBERCELL 5C - S139 Bone/Graft/Tissue/Human/Synth MERCEDES-VIABLE BONE MATRIX FIBERCELL 5CC [facility] [id number] N/A 1 Implanted   SCREW MULTI AXIAL 3.5X12MM Spine SCREW MULTI AXIAL 3.5X12MM  [place]  N/A 4 Implanted   IMPL SPINE FARRAH PRE-CUT 3.5X25MM Spine IMPL SPINE FARRAH PRE-CUT 3.5X25MM  [place]  N/A 2 Implanted   SCREW LK INFINITY TI SET Spine SCREW LK INFINITY TI SET  [place]  N/A 4 Implanted         Drains:   Closed/Suction Drain Midline Back Accordion 10 Bulgarian (Active)   Site Description Unable to view 08/02/20 1600   Dressing Status Clean;Dry; Intact 08/02/20 1600   Drainage Appearance Bloody 08/02/20 1600   Status Compressed 08/02/20 1600   Output (ml) 100 ml 08/02/20 1719       Urethral Catheter Intermittent 16 fr (Active)   Catheter Indications Perioperative use in selected surgeries including but not limited to urologic, pelvic or need for intraoperative monitoring of urinary output due to prolonged surgery, large volume infusion or need for diuretic therapy in surgery 08/02/20 1600   Site Assessment No urethral drainage 08/02/20 1600   Urine Color Gertrudis 08/02/20 1430   Urine Appearance Clear 08/02/20 1430   Output (mL) 450 mL 08/02/20 1731       [REMOVED] External Urinary Catheter (Removed)   Catheter changed  Yes 07/31/20 0557   Urine Color Yellow 07/31/20 1600   Urine Appearance Clear 07/31/20 1600   Urine Odor Malodorous 07/31/20 0557   Output (mL) 500 mL 08/01/20 0000   Placement Replaced 08/01/20 0000   Skin Assessment No Injury 07/31/20 1600       Findings: C1/2 cord compression with hypertrophied ligamentous flavum. Left C4/5 forminal stenosis       Brief hx and indication for surgery: This is a 52-year-old man who present presents with left upper extremity worse than lower extremity weakness. He had work-up with a CT head that shows right frontal traumatic subarachnoid and punctate area of right frontal acute versus acute ischemia. An MRI cervical spine shows C1/2 cord compression as well as C4-5 cord compression with severe left sided foraminal stenosis  with C1/2 cord edema consistent with spinal cord injury. His left deltoid was very weak once out of 5, bicep 2-3 out of 5, triceps 4/5. I recommended cervical decompression and fusion to reduce the risk of further spinal cord dysfunction in the future. I showed the patient and the family the scans explained the indications for surgery, the expected outcomes, the alternatives surgery which include no surgery, delayed surgery, vs anterior surgery. The risk includes bleeding, pain, infection, CSF leak, cord/nerve damage, stroke, worsening symptoms, hardware malfunction, need for more surgery, anesthesia and medical complications, death.   All questions were answered and I was asked to proceed with surgery Operative details: The patient was brought to the operating room by anesthesiologist. Mariela Miller IVs were secured, patient was induced and intubated by the anesthesiology team.  A Davis peri device was placed in the patient's skull and then patient was turned prone in the operating table with chest bolstered by gel rolls. The back and the legs were was elevated, eyes, arms, bony surface and all points of contact were thoroughly padded, and the Davis was secured to the table with the neck in a neutral alignment. Patient was secured to the table by wrapping with tape. The occipital area was shaved and the neck  was thoroughly prepped and carefully and sterilely draped. IV antibiotics were given. A timeout was called and all members operative team agreed to the procedure. A C-arm was bought in to localize the level of the incision. Skin was infiltrated with lidocaine with epinephrine and incised with a 10 blade. Hemostasis obtained with bipolar cautery. The midline raphae was dissected down to the level of the spinous process using knife dissection. A subperiosteal dissection was completed using combination of Bovie and Dominguez to expose the lamina and facets. Lateral fluoroscopy was used to localize C2 with an . We then continued dissection to expose the C1, C2, C4, C5 lamina and facet joints, minimizing injury to the adjacent  joint capsules. Then using a high-speed drill and Kerrison a c1/2 laminectomy was completed leaving a rim of superficial C1 lamina intact. C1/2 ligamentum flavum was hypertrophied and tightly adhered to the dura. This was dissected off the dura carefully with microdissector under loupe magnification. Hemostasis obtained with gentle bipolar,Surgi-Uriel, and tamponade. The epidural space was probes superiorly and inferiorly and there was no further cord compression. At the C4-5 level, a laminectomy was completed using similar technique.   Then operative microscope was brought in for a left C5 foraminotomies. A left C4-5 medial facetectomy done using high-speed drill. The nerve root was unroofed and decompressed circumferentially and followed distally more than a centimeter until the exit of the wound was without any compression with a probe. Using standard landmarks, C4/5 facet screws start points were drilled with a high-speed matchstick drill, screw hole was cored with powered twist drill to a depth of 12 mm, tapped, and 4 3.5 mm x 12 mm  screws placed into the facets. Two 25 mm rods were  minimally bent and placed on the screw heads and set screws were secured. The facet and joint space were burred. The wound was thoroughly irrigated and hemostasis obtained. Morselized autologous bone,  DBX, and stem cells  was placed in the lateral gutters. This completes the fusion process. Final hemostasis was obtained after thorough irrigation with bipolar cautery. A 10 Bengali Hemovac drain was placed in subfascial space. Vancomycin powder was placed in the subfascial and subcutaneous space. Wound was closed in multiple layers with skin was closed with Dermabond. .  The morel was then disconnected from the table. Patient was turned supine onto a hospital bed, and the Morel was removed. Patient was extubated and recovered in the PACU without complications. Instrument, sponge, and needle counts were correct prior to wound closure and at the conclusion of the case.          Electronically signed by Cisco Salvador DO on 8/2/2020 at 8:34 PM

## 2020-08-03 NOTE — PROGRESS NOTES
Neurosurgery ARYA/Resident    Daily Progress Note   Chief Complaint   Patient presents with    Altered Mental Status     Last known well 1210     8/3/2020  2:15 PM    Chart reviewed. No acute events overnight. No new complaints. Vitals:    08/03/20 0000 08/03/20 0300 08/03/20 0907 08/03/20 1318   BP: 138/61 (!) 147/76 (!) 152/56 (!) 117/51   Pulse: 73 74 77 65   Resp: 19 21 12 22   Temp: 98.2 °F (36.8 °C) 98 °F (36.7 °C) 97.9 °F (36.6 °C) 98 °F (36.7 °C)   TempSrc: Oral Oral Oral Oral   SpO2:    100%   Weight:       Height:           PE:   AOx3   CNII-XII intact   PERRL, EOMI   Motor   L deltoid 1/5; R deltoid 5/5  L biceps 3/5; R biceps 5/5  L triceps 3/5; R triceps 5/5  L HG 3/5; R intrinsics 5/5      L iliopsoas 2/5 , R iliopsoas 5/5  L quadriceps 2/5; R quadriceps 5/5  L Dorsiflexion 4/5; R dorsiflexion 5/5  L Plantarflexion 4/5; R plantarflexion 5/5  L EHL 4/5; R EHL 5/5    Sensation intact     Drain output 120ml/12hr  Incision dressing intact, clean and dry      Lab Results   Component Value Date    WBC 7.4 08/03/2020    HGB 9.4 (L) 08/03/2020    HCT 30.4 (L) 08/03/2020     08/03/2020    CHOL 146 07/30/2020    TRIG 48 07/30/2020    HDL 65 07/30/2020     08/03/2020    K 4.1 08/03/2020    CL 90 (L) 08/03/2020    CREATININE 0.47 (L) 08/03/2020    BUN 18 08/03/2020    CO2 18 (L) 08/03/2020    TSH 1.44 07/29/2020    INR 1.0 08/03/2020    LABA1C 5.2 07/30/2020       Radiology   Ct Cervical Spine Wo Contrast    Result Date: 8/3/2020  EXAMINATION: CT OF THE CERVICAL SPINE WITHOUT CONTRAST 8/3/2020 8:50 am TECHNIQUE: CT of the cervical spine was performed without the administration of intravenous contrast. Multiplanar reformatted images are provided for review. Dose modulation, iterative reconstruction, and/or weight based adjustment of the mA/kV was utilized to reduce the radiation dose to as low as reasonably achievable. COMPARISON: None.  HISTORY: ORDERING SYSTEM PROVIDED HISTORY: s/p posterior cervical fusion TECHNOLOGIST PROVIDED HISTORY: s/p posterior cervical fusion Reason for Exam: s/p posterior cervical fusion Acuity: Unknown Type of Exam: Subsequent/Follow-up FINDINGS: BONES/ALIGNMENT: There is no acute fracture or traumatic malalignment. There are mild compression deformities of T1 and T2, presumably old although clinical correlation is necessary. DEGENERATIVE CHANGES: There is spurring at multiple levels. Moderate disc space narrowing is seen at C3-4 and C6-7 with some vacuum phenomenon at C6-7. There is slight grade 1 anterolisthesis of C4 on C5. There has been prior posterior decompression and fusion at C4-5. Bilateral pedicle screws are seen at the C4 and C5 levels. The hardware appears intact and in good position. There is gas in the posterior soft tissues, consistent with recent surgery. A surgical drain is seen posteriorly extending up to the right of the midline with the tip located posteriorly at C2-3 level. There appears to be recent resection of the posterior arch of C 2. Extensive arthritic change is seen about C1-2 with prominent sclerosis within the dens and superior body of C2 with multiple well-circumscribed lucencies. The findings could be related to severe degenerative disease with subchondral cyst formation. Erosive arthritis could give a similar appearance. There is a deformity of the dens with relative superior subluxation of the anterior arch of C1 in relation to the dens. No aggressive bony destructive lesion is seen. Small well-circumscribed lucencies are seen in the right side of the C6 vertebral body, probably cyst formation related to degenerative disease. There are well-circumscribed bony densities adjacent to the spinous processes of C7 and T1, consistent with old trauma. There is moderate neural foraminal stenosis on the left at C2-3. Severe left and moderate right C3-4 and severe bilateral C4-5 and C5-6 neural foraminal stenosis.   Moderate to severe

## 2020-08-03 NOTE — PLAN OF CARE
Problem: Skin Integrity:  Goal: Absence of new skin breakdown  Description: Absence of new skin breakdown  8/3/2020 1335 by Adeel Rodriguez RN  Outcome: Ongoing  Note: Patient is be up in chair Qshift to help prevent skin breakdown. 8/3/2020 0241 by Aron Crawford RN  Outcome: Ongoing   Patient to be up in chair Qshift.

## 2020-08-03 NOTE — PROGRESS NOTES
Pharmacy Note  Vancomycin Consult    Supriya Begum is a 81 y.o. male started on Vancomycin for staph aureus in urine (<10,000); consult received from Dr. Elihu Gottron to manage therapy. Also receiving the following antibiotics: cefazolin. Patient Active Problem List   Diagnosis    Acute ischemic stroke ([facility]    Subarachnoid hemorrhage (HCC)    Acute cerebral infarction ([facility] [address]    Left hemiparesis (HCC)    Parkinson disease ([facility] [address]    Cerebrovascular accident (CVA) ([facility]       Allergies:  Patient has no known allergies. Temp max: 98.2    Recent Labs     08/02/20  0545 08/03/20  0502   BUN 16 18       Recent Labs     08/02/20  0545 08/03/20  0502   CREATININE 0.52* 0.47*       Recent Labs     08/02/20  0545 08/03/20  0502   WBC 6.5 7.4         Intake/Output Summary (Last 24 hours) at 8/3/2020 0912  Last data filed at 8/3/2020 2716  Gross per 24 hour   Intake 1600 ml   Output 1175 ml   Net 425 ml       Culture Date      Source                       Results  8/2                       urine straight cath      staph aureus <10,000    Ht Readings from Last 1 Encounters:   07/29/20 5' 6\" (1.676 m)        Wt Readings from Last 1 Encounters:   08/02/20 164 lb 10.9 oz (74.7 kg)         Body mass index is 26.58 kg/m². Estimated Creatinine Clearance: 106 mL/min (A) (based on SCr of 0.47 mg/dL (L)). Goal Trough Level: 10-15 mcg/mL    Assessment/Plan:  Will initiate Vancomycin with a one time loading dose of 0 mg x1, followed by 1250 mg IV every 18 hours. Timing of trough level will be determined based on culture results, renal function, and clinical response. Thank you for the consult. Will continue to follow.     Alicia SpencerD, BCPS 8/3/2020 9:32 AM

## 2020-08-03 NOTE — PROGRESS NOTES
PROGRESS NOTE          PATIENT NAME: [facility] RECORD NO. [id number]  DATE: 8/3/2020  SURGEON: Max Mcadams  PRIMARY CARE PHYSICIAN: Yeni Aly MD    HD: # 5    ASSESSMENT    Patient Active Problem List   Diagnosis    Acute ischemic stroke ([address]    Subarachnoid hemorrhage ([address]    Acute cerebral infarction ([address]    Left hemiparesis ([address]    Parkinson disease ([address]    Cerebrovascular accident (CVA) [facility])       MEDICAL DECISION MAKING AND PLAN    1. Neuro  1. Small subarachnoid hemorrhage  1. No need for repeat head CT  2. Pain controlled on tylenol, robaxin  3. Please keep systolic blood pressure less than 185  4. Please keep blood glucose less than 180  2. On home Parkinson medications  3. Neurology on board  4. MRI shows possible spinal cord edema, ligamentous injury is not ruled out                   -day 1 s/p C1/C2 laminectomy, C4/5 laminectomy and fusion  2. GI  1. Tolerating General Diet  - d/c fluids  3. Renal/Electrolytes  1. Replace electrolytes as needed  2. Urine output unmeasured  1. Na 142, K 4.1 Cr 0.47  4. Heme  1. hgb 9.4 wbc 7.4 yesterday  2. ANGELICA drain in place,   5. LDA  1. N/a  6. Dispo  1. No acute injuries to manage from a traumatic standpoint, neurology consulted and agreed to take over as the primary service for the patient  2. Trauma surgery will sign off at this time, please contact us with any questions regarding patient management         Chief Complaint: \"I have a headache\"    SUBJECTIVE    Zakia Orn seen and examined. Patient is day 1 s/p C1/C2 laminectomy, C4/5 laminectomy and fusion. Pain is controlled at this time, he does have mechanical difficulty feeding himself with his left upper extremity weakness, but he is tolerating a general diet and Ensure supplements. No acute events overnight, is urinating adequately. Vitals stable.  Patient afebrile    OBJECTIVE  VITALS: Temp: Temp: 97.9 °F (36.6 °C)Temp  Av.9 °F (36.6 °C)  Min: 96.8 °F (36 °C)  Max:

## 2020-08-03 NOTE — PROGRESS NOTES
PT/OT tried to get patient in the chair but said that patient couldn't even sit up on the edge of the bed. PT/OT felt is was not safe for patient to be in the chair at this time.

## 2020-08-03 NOTE — PROGRESS NOTES
Speech Language Pathology  Speech Language Pathology  [facility] [facility]    Cognitive Treatment Note    Date: 8/3/2020  Patients Name: Steven Og  MRN: [id number]  Diagnosis:   Patient Active Problem List   Diagnosis Code    Acute ischemic stroke (HCC) I63.9    Subarachnoid hemorrhage (HCC) I60.9    Acute cerebral infarction ([facility] [address] I63.9    Left hemiparesis ([facility]) G81.94    Parkinson disease ([facility] [address]    [facility] I63.9       Pain: 0/10    Cognitive Treatment    Treatment time: 10:34-11:09    Subjective: [x] Alert [x] Cooperative     [] Confused     [] Agitated    [] Lethargic    Objective/Assessment:  Attention: maintained throughout session    Recall:   Delayed recall, 3 units (associated): 3/3 x2 independently (10 minute delay; 15-minute delay)     Problem Solving/Reasoning: *Increased processing time with these tasks noted throughout session. Word generation, concrete, 8 units: 3/4 increased to 4/4 with min verbal cues   Word generation, abstract, 6 units: 2/4 increased to 4/4 with min-mod semantic and phonemic cues   Word generation, phonemic category members: 100% independently     Other: Pt. Seen for O/M treatment program for facial weakness. Pt. Completed O/M exercises X10 X1 sets with min verbal cues. ST reviewed dysarthria compensatory strategies to facilitate speech clarity with pt. ST observed several instances of word retrieval difficulties and increased processing time (latency of 15-20 seconds) this date. Plan:  [x] Continue  services    [] Discharge from :      Discharge recommendations: Further therapy recommended at discharge.     Treatment completed by: Marian Willoughby M.S., CF-SLP

## 2020-08-03 NOTE — PROGRESS NOTES
Neurology Resident Progress Note      SUBJECTIVE:  This is a 81 y.o.  male admitted 7/29/2020 for [facility] (subarachnoid hemorrhage) ([facility] [address] [I60.9]  This is a follow-up neurology progress note. The patient was seen and examined and the chart was reviewed. There were no acute events overnight. Patient seen and examined. No acute issues overnight. Hemodynamically and vitally stable. Afebrile. Labs reviewed. Underwent C1-C2 laminectomy, C4-C5 laminectomy along with fusion and segmental fixation. Hemoglobin is stable, urine growing staph aureus. Creatinine is stable we will start the patient on vancomycin, await culture and sensitivity will change antibiotic accordingly. Place Silver and ANGELICA drain in place. Patient motor strength improving patient is able to move left lower extremity better than he was. Left upper extremity motor strength 1/5. ROS  Constitutional: no fever, chills, fatigue  HENT: No change in vision or hearing   Respiratory: No cough, SOB, wheezing. Cardiovascular:  No chest pain, palpitations, leg swelling. Gastrointestinal: No nausea, vomiting, diarrhea. Genitourinary: No increased frequency, urgency. Musculoskeletal: No myalgia or arthralgia. Skin: No rashes or scarring or bruises. Neurological: No headache, paresthesia, or focal weakness. Endo/Heme/Allergies: Negative for itchy eyes or runny nose. Psychiatric/Behavioral: No anxiety or depressed mood.      HPI  See H&P     vancomycin  1,250 mg Intravenous Q12H    IV syringe builder   Intravenous Once    sodium chloride flush  10 mL Intravenous 2 times per day    sennosides-docusate sodium  1 tablet Oral BID    sodium chloride  1 g Oral TID     carbidopa-levodopa  1 tablet Oral 4x Daily    carbidopa-levodopa  3 tablet Oral BID    carbidopa-levodopa  2 tablet Oral BID    doxazosin  2 mg Oral Nightly    propranolol  60 mg Oral BID    propranolol  40 mg Oral BID    docusate sodium  100 mg Oral Daily    polyethylene glycol  17 g Oral Daily    finasteride  5 mg Oral Daily    citalopram  20 mg Oral Daily    primidone  50 mg Oral BID    amantadine  100 mg Oral BID    atorvastatin  20 mg Oral Nightly    sodium chloride flush  10 mL Intravenous 2 times per day    acetaminophen  1,000 mg Oral 3 times per day    methocarbamol  750 mg Oral TID    senna  1 tablet Oral Daily       Past Medical History:   Diagnosis Date    BPH (benign prostatic hyperplasia)     Cancer (HCC)     basal cell carcinoma    Cervical spondylolysis     Depression     Hyperlipidemia     Hypertension     Neck pain     Neuropathy     Orofacial dyskinesia     Osteoarthritis     Parkinson's disease ([facility] [address]        Past Surgical History:   Procedure Laterality Date    CERVICAL FUSION  08/02/2020    C4-5    CERVICAL LAMINECTOMY  08/02/2020    C1-2/C 4-5    JOINT REPLACEMENT         PHYSICAL EXAM:      Blood pressure (!) 152/56, pulse 77, temperature 97.9 °F (36.6 °C), temperature source Oral, resp. rate 12, height 5' 6\" (1.676 m), weight 164 lb 10.9 oz (74.7 kg), SpO2 100 %. General Examination    General Resting comfortably in bed   Head Normocephalic, without obvious abnormality   Neck Supple, symmetrical. Good ROM. No midline or paraspinal tenderness. Lungs Respirations unlabored, no wheezing   Chest Wall No deformity   Heart RRR, no murmur   Abdomen Soft. Non-tender, non-distended   Extremities No cyanosis or edema or warmth. Pulses 2+ and symmetric   Skin: Skin  turgor normal, no rashes or lesions     Mental status  Speech Alert. Oriented to person, place, and time. Speech is fluent without paraphasic errors  Good repetition and naming  Can do 1 step, 2 step, and cross-body commands  Can spell world backwards. Language appropriate. No hallucinations or delusions. No SI/HI. Cranial nerves   II - VFF, visual threat intact  III, IV, VI - extra-ocular muscles full. No nystagmus. Pupils symmetric and responsive.    V - sensation symmetric         VII -mild decreased left NLF  VIII - intact hearing to conversational tone          IX, X - symmetrical palate elevation   XI - 5/5 strength symmetric  XII - tongue midline   Motor function  Strength: Left arm and leg 1/5 strength  Right arm and leg 5 out of 5 strength  -Rigidity bilateral upper and lower extremities  Bulk: grossly normal no atrophy  Tone: symmetric b/l arms and legs  Abnormal movements: No abnormal movements or tremor   Sensory function Symmetric to touch in all extremities bilaterally   Cerebellar No dysmetria or dysdiadochocinesia    Reflex function DTR: Reflexes are brisk on left side  Babinski b/l plantar downgoing   Gait                  Not assessed       Investigations:      Laboratory Testing:  Recent Results (from the past 24 hour(s))   Culture, Urine    Collection Time: 08/02/20 11:10 AM    Specimen: Urine, indwelling catheter   Result Value Ref Range    Specimen Description . INDWELLING CATH URINE     Special Requests NOT REPORTED     Culture DUPLICATE ORDER    Culture, Urine    Collection Time: 08/02/20 11:42 AM    Specimen: Urine, straight catheter   Result Value Ref Range    Specimen Description . URINE,STRAIGHT CATHETER     Special Requests NOT REPORTED     Culture STAPHYLOCOCCUS AUREUS <[id number] CFU/ML (A)    POC Glucose Fingerstick    Collection Time: 08/02/20  3:25 PM   Result Value Ref Range    POC Glucose 120 (H) 75 - 110 mg/dL   Osmolality    Collection Time: 08/03/20  2:00 AM   Result Value Ref Range    Serum Osmolality 280 275 - 295 mOsm/kg   OSMOLALITY, URINE    Collection Time: 08/03/20  2:09 AM   Result Value Ref Range    Osmolality, Ur 412 80 - 1300 mOsm/kg   SODIUM, URINE, RANDOM    Collection Time: 08/03/20  2:09 AM   Result Value Ref Range    Sodium,Ur 51 mmol/L   CBC WITH AUTO DIFFERENTIAL    Collection Time: 08/03/20  5:02 AM   Result Value Ref Range    WBC 7.4 3.5 - 11.3 k/uL    RBC 2.76 (L) 4.21 - 5.77 m/uL    Hemoglobin 9.4 (L) 13.0 - 17.0 g/dL    Hematocrit 30.4 (L) 40.7 - 50.3 %    .1 (H) 82.6 - 102.9 fL    MCH 34.1 (H) 25.2 - 33.5 pg    MCHC 30.9 28.4 - 34.8 g/dL    RDW 14.4 11.8 - 14.4 %    Platelets 108 515 - 739 k/uL    MPV 11.1 8.1 - 13.5 fL    NRBC Automated 1.6 (H) 0.0 per 100 WBC    Differential Type NOT REPORTED     WBC Morphology NOT REPORTED     RBC Morphology NOT REPORTED     Platelet Estimate NOT REPORTED     Immature Granulocytes 0 0 %    Seg Neutrophils 72 (H) 36 - 65 %    Lymphocytes 13 (L) 24 - 43 %    Monocytes 14 (H) 3 - 12 %    Eosinophils % 0 (L) 1 - 4 %    Basophils 1 0 - 2 %    Absolute Immature Granulocyte 0.00 0.00 - 0.30 k/uL    Segs Absolute 5.33 1.50 - 8.10 k/uL    Absolute Lymph # 0.96 (L) 1.10 - 3.70 k/uL    Absolute Mono # 1.04 0.10 - 1.20 k/uL    Absolute Eos # 0.00 0.00 - 0.44 k/uL    Basophils Absolute 0.07 0.00 - 0.20 k/uL    Morphology MACROCYTOSIS PRESENT    Basic Metabolic Panel w/ Reflex to MG    Collection Time: 08/03/20  5:02 AM   Result Value Ref Range    Glucose 94 70 - 99 mg/dL    BUN 18 8 - 23 mg/dL    CREATININE 0.47 (L) 0.70 - 1.20 mg/dL    Bun/Cre Ratio NOT REPORTED 9 - 20    Calcium 7.8 (L) 8.6 - 10.4 mg/dL    Sodium 142 135 - 144 mmol/L    Potassium 4.1 3.7 - 5.3 mmol/L    Chloride 90 (L) 98 - 107 mmol/L    CO2 18 (L) 20 - 31 mmol/L    Anion Gap 34 (H) 9 - 17 mmol/L    GFR Non-African American >60 >60 mL/min    GFR African American >60 >60 mL/min    GFR Comment          GFR Staging NOT REPORTED    Protime-INR    Collection Time: 08/03/20  5:02 AM   Result Value Ref Range    Protime 10.2 9.0 - 12.0 sec    INR 1.0    APTT    Collection Time: 08/03/20  5:02 AM   Result Value Ref Range    PTT 21.5 20.5 - 30.5 sec       Imaging/Diagnostics:  Xr Hand Right (min 3 Views)    Result Date: 7/30/2020  EXAMINATION: THREE XRAY VIEWS OF THE RIGHT HAND 7/30/2020 11:44 am COMPARISON: None.  HISTORY: ORDERING SYSTEM PROVIDED HISTORY: hand pain TECHNOLOGIST PROVIDED HISTORY: hand pain Reason for Exam: hand pain Acuity: Unknown FINDINGS: Paralyzed osseous structure triscaphe and thumb CMC degenerative change. Degenerative changes of the 1st MCP and IP joint. Degenerative changes of the 3rd PIP joint. hook osteophytes of the 2nd 3rd metacarpals. Degenerative changes of the hand. Xr Foot Left (min 3 Views)    Result Date: 7/30/2020  EXAMINATION: THREE XRAY VIEWS OF THE LEFT FOOT 7/30/2020 11:44 am COMPARISON: None HISTORY: ORDERING SYSTEM PROVIDED HISTORY: foot pain TECHNOLOGIST PROVIDED HISTORY: foot pain Reason for Exam: foot pain Acuity: Unknown Acute left foot pain. Initial encounter. FINDINGS: Moderate 1st MTP degenerative changes. Moderate midfoot degenerative changes. Lisfranc alignment is maintained. Hammertoe deformity of the 2nd digit. 1. No acute radiographic finding to account for patient's left foot pain. 2. Moderate 1st MTP degenerative changes. 3. Moderate midfoot degenerative changes. 4. Hammertoe deformity of the 2nd digit. Ct Head Wo Contrast    Result Date: 7/30/2020  EXAMINATION: CT OF THE HEAD WITHOUT CONTRAST,  7/29/2020 1:40 pm TECHNIQUE: CT of the head was performed without the administration of intravenous contrast. Dose modulation, iterative reconstruction, and/or weight based adjustment of the mA/kV was utilized to reduce the radiation dose to as low as reasonably achievable. COMPARISON: None HISTORY: ORDERING SYSTEM PROVIDED HISTORY: Stroke, L sided weakness. LKW 1210 pm TECHNOLOGIST PROVIDED HISTORY: Stroke, L sided weakness. LKW 1210 pm Reason for Exam: Stroke, L sided weakness. LKW 1210 pm Acuity: Unknown Type of Exam: Unknown Initial evaluation FINDINGS: BRAIN/VENTRICLES: There is mild cerebral atrophy. There is atherosclerotic calcification of the cavernous carotid arteries. There are areas of hypoattenuation in the periventricular white matter and centrum semiovale that are likely related to chronic small vessel ischemic disease.   There is a focal area of hypoattenuation in the left frontal lobe that likely represents an infarct of indeterminate age. There is no midline shift or mass effect. There is a subtle questionable area of hyperdensity in one of the sulci over the high right parietal region. While this may be artifact, very subtle subarachnoid hemorrhage cannot be excluded. ORBITS: The visualized portion of the orbits demonstrate no acute abnormality. SINUSES:  The visualized paranasal sinuses and mastoid air cells are clear. SOFT TISSUES/SKULL:  No acute abnormality of the visualized skull or soft tissues. Cerebral atrophy. Chronic small vessel ischemic changes. Area of hypoattenuation in the left frontal periventricular white matter which likely represents an infarct of indeterminate age. Questionable subtle hyperattenuation over the right frontal convexity which is suspicious for a tiny subarachnoid hemorrhage. Report was called and discussed with Dr. Cat Talbot, 07/29/2020 at 2:16 p.m. Ct Cervical Spine Wo Contrast    Result Date: 7/29/2020  EXAMINATION: CT OF THE CERVICAL SPINE WITHOUT CONTRAST 7/29/2020 7:05 pm TECHNIQUE: CT of the cervical spine was performed without the administration of intravenous contrast. Multiplanar reformatted images are provided for review. Dose modulation, iterative reconstruction, and/or weight based adjustment of the mA/kV was utilized to reduce the radiation dose to as low as reasonably achievable. COMPARISON: None HISTORY: ORDERING SYSTEM PROVIDED HISTORY: trauma TECHNOLOGIST PROVIDED HISTORY: trauma Reason for Exam: fall Acuity: Acute Type of Exam: Initial FINDINGS: No acute fracture. No subluxation. No prevertebral soft tissue swelling. Severe multilevel degenerative changes. Abnormal C1-C2 positioning with widening on the left. Abnormal C1-C2 relationship which may be related to positioning. If there is concern for ligamentous injury, MRI is recommended. No acute fracture.  Severe multilevel degenerative changes. Ct Thoracic Spine Wo Contrast    Result Date: 7/29/2020  EXAMINATION: CT OF THE THORACIC SPINE WITHOUT CONTRAST; CT OF THE LUMBAR SPINE WITHOUT CONTRAST 7/29/2020 TECHNIQUE: CT of the thoracic spine was performed without the administration of intravenous contrast. Multiplanar reformatted images are provided for review. Dose modulation, iterative reconstruction, and/or weight based adjustment of the mA/kV was utilized to reduce the radiation dose to as low as reasonably achievable.; CT of the lumbar spine was performed without the administration of intravenous contrast. Multiplanar reformatted images are provided for review. Dose modulation, iterative reconstruction, and/or weight based adjustment of the mA/kV was utilized to reduce the radiation dose to as low as reasonably achievable. COMPARISON: CT chest abdomen pelvis 07/29/2020 HISTORY: ORDERING SYSTEM PROVIDED HISTORY: trauma TECHNOLOGIST PROVIDED HISTORY: trauma Reason for Exam: fall Acuity: Acute Type of Exam: Initial; ORDERING SYSTEM PROVIDED HISTORY: TRAUMA TECHNOLOGIST PROVIDED HISTORY: trauma Reason for Exam: fall Acuity: Acute Type of Exam: Initial FINDINGS: BONES/ALIGNMENT: There is no acute fracture or traumatic malalignment. Central plate depression W7-S1 appears chronic. DEGENERATIVE CHANGES: Severe multilevel degenerate changes. Multilevel ankylosis identified. Slight widening of the disc spaces identified at T5-T6 and T12-L1 likely degenerative severe multilevel disc space disease involving the lumbar spine with multilevel vacuum disc phenomena. SOFT TISSUES: No paraspinal mass is seen. Moderate severe multilevel degenerate changes of the thoracic spine and severe multilevel degenerate changes of the lumbar spine.  No convincing evidence acute fracture traumatic malalignment     Ct Lumbar Spine Wo Contrast    Result Date: 7/29/2020  EXAMINATION: CT OF THE THORACIC SPINE WITHOUT CONTRAST; CT OF THE LUMBAR SPINE WITHOUT CONTRAST 7/29/2020 TECHNIQUE: CT of the thoracic spine was performed without the administration of intravenous contrast. Multiplanar reformatted images are provided for review. Dose modulation, iterative reconstruction, and/or weight based adjustment of the mA/kV was utilized to reduce the radiation dose to as low as reasonably achievable.; CT of the lumbar spine was performed without the administration of intravenous contrast. Multiplanar reformatted images are provided for review. Dose modulation, iterative reconstruction, and/or weight based adjustment of the mA/kV was utilized to reduce the radiation dose to as low as reasonably achievable. COMPARISON: CT chest abdomen pelvis 07/29/2020 HISTORY: ORDERING SYSTEM PROVIDED HISTORY: trauma TECHNOLOGIST PROVIDED HISTORY: trauma Reason for Exam: fall Acuity: Acute Type of Exam: Initial; ORDERING SYSTEM PROVIDED HISTORY: TRAUMA TECHNOLOGIST PROVIDED HISTORY: trauma Reason for Exam: fall Acuity: Acute Type of Exam: Initial FINDINGS: BONES/ALIGNMENT: There is no acute fracture or traumatic malalignment. Central plate depression N6-Y0 appears chronic. DEGENERATIVE CHANGES: Severe multilevel degenerate changes. Multilevel ankylosis identified. Slight widening of the disc spaces identified at T5-T6 and T12-L1 likely degenerative severe multilevel disc space disease involving the lumbar spine with multilevel vacuum disc phenomena. SOFT TISSUES: No paraspinal mass is seen. Moderate severe multilevel degenerate changes of the thoracic spine and severe multilevel degenerate changes of the lumbar spine.  No convincing evidence acute fracture traumatic malalignment     Mri Cervical Spine Wo Contrast    Result Date: 7/30/2020  EXAMINATION: MRI OF THE CERVICAL SPINE WITHOUT CONTRAST 7/30/2020 6:12 pm TECHNIQUE: Multiplanar multisequence MRI of the cervical spine was performed without the administration of intravenous contrast. COMPARISON: CT cervical spine July 29, 2020 HISTORY: ORDERING SYSTEM PROVIDED HISTORY: possible unstable spine TECHNOLOGIST PROVIDED HISTORY: possible unstable spine Reason for Exam: possible unstable spine FINDINGS: BONES/ALIGNMENT: There is straightening of normal cervical lordosis. There is partial osseous fusion at the posterior aspects of C2-3 and C3-4. There are mild chronic compression deformities at the superior endplates of T1 and T2. The cervical vertebral body heights are grossly maintained, without acute fracture or destructive osseous lesion. There is 2 mm C4 on C5 anterolisthesis, 1-2 mm C6 on C7 anterolisthesis. There is degenerative disc disease with disc desiccation, disc space narrowing and endplate changes. 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 SPINAL CORD: There is increased T2 signal in the cervical spinal cord at C1-2 level, likely related to spinal cord edema. 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 SOFT TISSUES: There is increased T2 signal in the asymmetrically widened right lateral atlanto-dens interval, nonspecific. Injury of the alar ligaments cannot be excluded. C1-2: There is moderate narrowing of the spinal canal with mild spinal cord compression. C2-C3: There is no significant disc herniation. There is mild spinal canal narrowing, moderate left foraminal narrowing. There is mild bilateral facet arthrosis with fusion of the bilateral facet joints. C3-C4: There is no significant disc herniation. There is mild spinal canal narrowing, moderate bilateral foraminal narrowing. There is moderate left facet arthrosis with fusion of the facet joint. C4-C5: There is anterolisthesis, disc bulge, severe left and mild right facet arthrosis, with mild-to-moderate spinal canal narrowing, mild spinal cord compression, moderate to severe bilateral foraminal narrowing.  C5-C6: There is disc bulge, severe left and mild right facet arthrosis, with minimal spinal canal narrowing, moderate to severe bilateral foraminal narrowing. C6-C7: There is disc bulge, moderate left and mild right facet arthrosis, with mild spinal canal narrowing, and moderate bilateral foraminal narrowing. C7-T1: There is no significant disc protrusion, spinal canal stenosis or neural foraminal narrowing. Increased T2 signal in the cervical spinal cord at C1-2 level, likely related to spinal cord edema. Increased T2 signal in the asymmetrically widened right lateral atlanto-dens interval, nonspecific. Injury of the alar ligaments cannot be excluded. Degenerative disc disease as described above, exacerbating congenitally narrow cervical spinal canal. Spinal canal narrowing, moderate at C1-2 with mild spinal cord compression, mild to moderate at C4-5 with mild spinal cord compression, mild at C2-3, C3-4 and C6-7, minimal at C5-6. Foraminal narrowing, moderate to severe at bilateral C4-5 and bilateral C5-6, moderate at left C2-3, bilateral C3-4 and bilateral C6-7. 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 Xr Shoulder Left (min 2 Views)    Result Date: 7/30/2020  EXAMINATION: TWO XRAY VIEWS OF THE LEFT SHOULDER 7/30/2020 11:44 am COMPARISON: None HISTORY: ORDERING SYSTEM PROVIDED HISTORY: shoulder pain TECHNOLOGIST PROVIDED HISTORY: shoulder pain Reason for Exam: shoulder pain Acuity: Unknown Acute left shoulder pain. FINDINGS: The humeral head aligns normally with the glenoid fossae. Coracoclavicular and acromioclavicular interval are within normal range. No visible fracture. No dislocation. There is a calcified granuloma in the left upper lobe. 1. No acute radiographic finding to account for patient's left shoulder pain. Xr Chest Portable    Result Date: 7/29/2020  EXAMINATION: ONE XRAY VIEW OF THE CHEST 7/29/2020 3:56 pm COMPARISON: None.  HISTORY: ORDERING SYSTEM PROVIDED HISTORY: pCO2 high, no SOB TECHNOLOGIST PROVIDED HISTORY: pCO2 high, no SOB FINDINGS: No focal consolidation. Mild cardiomegaly. No pulmonary edema. Calcified granuloma. No acute findings. Cta Head Neck W Contrast    Result Date: 7/30/2020  EXAMINATION: CTA OF THE HEAD AND NECK WITH CONTRAST 7/29/2020 1:40 pm: TECHNIQUE: CTA of the head and neck was performed with the administration of intravenous contrast. Multiplanar reformatted images are provided for review. MIP images are provided for review. Stenosis of the internal carotid arteries measured using NASCET criteria. Dose modulation, iterative reconstruction, and/or weight based adjustment of the mA/kV was utilized to reduce the radiation dose to as low as reasonably achievable. COMPARISON: None. HISTORY: ORDERING SYSTEM PROVIDED HISTORY: stroke symptoms, L sided weakness, LKW 1210pm TECHNOLOGIST PROVIDED HISTORY: stroke symptoms, L sided weakness, LKW 1210pm Reason for Exam: stroke, L sided weakness. LKW 1210pm Acuity: Unknown Type of Exam: Unknown Initial evaluation. FINDINGS: CTA NECK: AORTIC ARCH/ARCH VESSELS: No significant abnormality of the aortic arch is identified. Incidental note is made of the left vertebral artery originating directly from the aortic arch which is a normal anatomic variant. CAROTID ARTERIES: The common carotid arteries are patent bilaterally. There is minimal atherosclerotic disease in the mid common carotid arteries bilaterally without any significant narrowing. There is atherosclerotic calcification and approximately 50-60% narrowing of the left internal carotid artery just distal to the bulb. There is atherosclerotic disease in the proximal bulb with approximately 30% narrowing of the vessel. The left internal carotid artery is then patent through the skull base. There is atherosclerotic calcification in the region of the right internal carotid artery bulb with approximately 20% stenosis of the vessel. The right internal carotid artery is then patent through the skull base.  VERTEBRAL ARTERIES: The right vertebral artery is dominant and patent in its visualized course. The left vertebral artery originates directly from the aortic arch and is patent in its visualized course. SOFT TISSUES: There is no acute abnormality of the visualized soft tissues. BONES: There are mild degenerative changes of the cervical spine. CTA HEAD: ANTERIOR CIRCULATION:  No abnormality of the internal carotid arteries, middle cerebral arteries, or anterior cerebral arteries are identified. POSTERIOR CIRCULATION:  No abnormality of the distal vertebral arteries, basilar artery, or posterior cerebral arteries are identified. Incidental note is made of fetal origin of the right posterior cerebral artery which is a normal anatomic variant. No obvious aneurysms are identified. OTHER: No dural venous sinus thrombosis on this non-dedicated study. Approximately 30% stenosis of the left internal carotid artery in the proximal left carotid bulb and approximately 50-60% stenosis of the left internal carotid artery just distal to the bulb. Atherosclerotic calcification of the right internal carotid artery with approximately 20% stenosis in the region of the bulb. The left vertebral artery originates directly from the aortic arch which is a normal anatomic variant. No significant abnormality of the intracranial circulation. Incidental note is made of fetal origin of the right posterior cerebral artery which is a normal anatomic variant. Ct Chest Abdomen Pelvis W Contrast    Result Date: 7/30/2020  EXAMINATION: CT OF THE CHEST, ABDOMEN, AND PELVIS WITH CONTRAST 7/29/2020 7:05 pm TECHNIQUE: CT of the chest, abdomen and pelvis was performed with the administration of intravenous contrast. Multiplanar reformatted images are provided for review. Dose modulation, iterative reconstruction, and/or weight based adjustment of the mA/kV was utilized to reduce the radiation dose to as low as reasonably achievable. COMPARISON: None. HISTORY: ORDERING SYSTEM PROVIDED HISTORY: trauma TECHNOLOGIST PROVIDED HISTORY: trauma Reason for Exam: fall Acuity: Acute Type of Exam: Initial Patient fell hitting head when opening front door. FINDINGS: Chest: Mediastinum: No mediastinal adenopathy or acute aortic abnormality. Calcification aortic arch is noted. Mild cardiomegaly. Coronary artery calcification. No pericardial effusion. Lungs/pleura: Mild emphysematous changes. No effusion, focal consolidation, or extrapleural air. Tracheobronchial tree is patent. Left upper lobe granuloma. Soft Tissues/Bones: Non-acute appearing fracture C7 spinous process versus soft tissue ligamentous calcification. No acute appearing osseous abnormality. No axillary adenopathy or acute soft tissue abnormality. Abdomen/Pelvis: Organs: Liver, gallbladder, pancreas, spleen, and adrenals are unremarkable. Kidneys contain multiple bilateral cysts largest in the right kidney of 5 cm and in the left kidney of 3.5 cm. No urinary obstruction. GI/Bowel: No free fluid, free air, bowel obstruction or bowel wall thickening. Increased colonic stool load. Pelvis: Bladder is intact. No abnormal fluid collections, pelvic or inguinal adenopathy. Bilateral fat containing inguinal hernias are noted without strangulation. Prostate is mildly enlarged. Peritoneum/Retroperitoneum:   No acute aortic abnormality; no aneurysm. No retroperitoneal or mesenteric adenopathy. Bones/Soft Tissues: Degenerative changes are present in the hips and lower lumbar facets. Multilevel degenerative and degenerative disc changes are noted. CT chest: Emphysematous changes. No extrapleural air, effusion or acute pulmonary findings. Atherosclerotic disease. CT abdomen and pelvis: No solid organ injury. No acute abdominopelvic process. Osseous findings as above.      Mri Limited Brain    Result Date: 7/30/2020  EXAMINATION: MRI OF THE BRAIN WITHOUT CONTRAST  7/29/2020 3:00 pm TECHNIQUE: Multiplanar multisequence MRI of the brain was performed without the administration of intravenous contrast. COMPARISON: CT head 01/29/2020 HISTORY: ORDERING SYSTEM PROVIDED HISTORY: add MARYELLEN Avila for stroke TECHNOLOGIST PROVIDED HISTORY: add SHAMIKA. [facility]ute for stroke Initial evaluation FINDINGS: INTRACRANIAL STRUCTURES/VENTRICLES: There is a punctate area of restricted diffusion measuring 3 mm medially in the posterior left frontal lobe along the falx that is suggestive of an acute infarct or subacute infarct. There is high signal in some of the sulci over the high right parietal lobe with corresponding low signal on the gradient echo images suggestive of subarachnoid hemorrhage. There is subarachnoid hemorrhage in the pre frontal sulcus. There is diffuse cerebral atrophy. There are chronic small vessel ischemic changes. There are several punctate areas of low signal scattered throughout the brain on the gradient echo images suggestive of previous areas of microhemorrhage. The findings were discussed with Dr. Alona Felton, 07/29/2020 at 3:18 p.m. Punctate 3 mm acute to subacute infarct in the posterior right frontal lobe adjacent to the falx. No other areas of restricted diffusion. There is subarachnoid hemorrhage over the high right parietal lobe and posterior right frontal lobe. Cerebral atrophy. Chronic small vessel ischemic changes.        Assessment & Differential Dx:      Primary Problem  <principal problem not specified>    Active Hospital Problems    Diagnosis Date Noted    Cerebrovascular accident (CVA) ([address] [I63.9]     Acute cerebral infarction ([address] [I63.9] 07/30/2020    Left hemiparesis ([address] [G81.94] 07/30/2020    Parkinson disease ([address] [place] 07/30/2020    Subarachnoid hemorrhage ([address] [I60.9] 07/29/2020       Case of 70-year-old WM with a fall story along with left arm and left leg weakness, Hx of Parkinson with baseline gait apraxia on Sinemet 25/100 twice daily, symmetrel 100 po BID and mysoline 100 sensitivity.             Pasquale Foley MD  PGY-2, Internal medicine resident/Neurologu service   [address] [place] [address]

## 2020-08-04 ENCOUNTER — APPOINTMENT (OUTPATIENT)
Dept: GENERAL RADIOLOGY | Age: 85
DRG: 957 | End: 2020-08-04
Payer: COMMERCIAL

## 2020-08-04 ENCOUNTER — APPOINTMENT (OUTPATIENT)
Dept: CT IMAGING | Age: 85
DRG: 957 | End: 2020-08-04
Payer: COMMERCIAL

## 2020-08-04 LAB
ABSOLUTE EOS #: <0.03 K/UL (ref 0–0.44)
ABSOLUTE IMMATURE GRANULOCYTE: 0.03 K/UL (ref 0–0.3)
ABSOLUTE LYMPH #: 0.89 K/UL (ref 1.1–3.7)
ABSOLUTE MONO #: 1.21 K/UL (ref 0.1–1.2)
ANION GAP SERPL CALCULATED.3IONS-SCNC: 8 MMOL/L (ref 9–17)
BASOPHILS # BLD: 0 % (ref 0–2)
BASOPHILS ABSOLUTE: <0.03 K/UL (ref 0–0.2)
BUN BLDV-MCNC: 18 MG/DL (ref 8–23)
BUN/CREAT BLD: ABNORMAL (ref 9–20)
CALCIUM SERPL-MCNC: 8.3 MG/DL (ref 8.6–10.4)
CHLORIDE BLD-SCNC: 97 MMOL/L (ref 98–107)
CO2: 23 MMOL/L (ref 20–31)
CREAT SERPL-MCNC: 0.48 MG/DL (ref 0.7–1.2)
DIFFERENTIAL TYPE: ABNORMAL
EOSINOPHILS RELATIVE PERCENT: 0 % (ref 1–4)
GFR AFRICAN AMERICAN: >60 ML/MIN
GFR NON-AFRICAN AMERICAN: >60 ML/MIN
GFR SERPL CREATININE-BSD FRML MDRD: ABNORMAL ML/MIN/{1.73_M2}
GFR SERPL CREATININE-BSD FRML MDRD: ABNORMAL ML/MIN/{1.73_M2}
GLUCOSE BLD-MCNC: 111 MG/DL (ref 70–99)
GLUCOSE BLD-MCNC: 94 MG/DL (ref 75–110)
HCT VFR BLD CALC: 33.1 % (ref 40.7–50.3)
HEMOGLOBIN: 10.7 G/DL (ref 13–17)
IMMATURE GRANULOCYTES: 0 %
LYMPHOCYTES # BLD: 11 % (ref 24–43)
MCH RBC QN AUTO: 32 PG (ref 25.2–33.5)
MCHC RBC AUTO-ENTMCNC: 32.3 G/DL (ref 28.4–34.8)
MCV RBC AUTO: 99.1 FL (ref 82.6–102.9)
MONOCYTES # BLD: 15 % (ref 3–12)
NRBC AUTOMATED: 0 PER 100 WBC
PDW BLD-RTO: 14.3 % (ref 11.8–14.4)
PLATELET # BLD: 137 K/UL (ref 138–453)
PLATELET ESTIMATE: ABNORMAL
PMV BLD AUTO: 11.7 FL (ref 8.1–13.5)
POTASSIUM SERPL-SCNC: 4.4 MMOL/L (ref 3.7–5.3)
RBC # BLD: 3.34 M/UL (ref 4.21–5.77)
RBC # BLD: ABNORMAL 10*6/UL
SEG NEUTROPHILS: 72 % (ref 36–65)
SEGMENTED NEUTROPHILS ABSOLUTE COUNT: 5.7 K/UL (ref 1.5–8.1)
SODIUM BLD-SCNC: 128 MMOL/L (ref 135–144)
SODIUM BLD-SCNC: 131 MMOL/L (ref 135–144)
WBC # BLD: 7.9 K/UL (ref 3.5–11.3)
WBC # BLD: ABNORMAL 10*3/UL

## 2020-08-04 PROCEDURE — 6370000000 HC RX 637 (ALT 250 FOR IP): Performed by: PHYSICIAN ASSISTANT

## 2020-08-04 PROCEDURE — 97129 THER IVNTJ 1ST 15 MIN: CPT

## 2020-08-04 PROCEDURE — 6370000000 HC RX 637 (ALT 250 FOR IP): Performed by: INTERNAL MEDICINE

## 2020-08-04 PROCEDURE — 70450 CT HEAD/BRAIN W/O DYE: CPT

## 2020-08-04 PROCEDURE — 6370000000 HC RX 637 (ALT 250 FOR IP): Performed by: STUDENT IN AN ORGANIZED HEALTH CARE EDUCATION/TRAINING PROGRAM

## 2020-08-04 PROCEDURE — 72040 X-RAY EXAM NECK SPINE 2-3 VW: CPT

## 2020-08-04 PROCEDURE — 80048 BASIC METABOLIC PNL TOTAL CA: CPT

## 2020-08-04 PROCEDURE — 6360000002 HC RX W HCPCS: Performed by: STUDENT IN AN ORGANIZED HEALTH CARE EDUCATION/TRAINING PROGRAM

## 2020-08-04 PROCEDURE — 85025 COMPLETE CBC W/AUTO DIFF WBC: CPT

## 2020-08-04 PROCEDURE — 2580000003 HC RX 258: Performed by: STUDENT IN AN ORGANIZED HEALTH CARE EDUCATION/TRAINING PROGRAM

## 2020-08-04 PROCEDURE — 2060000000 HC ICU INTERMEDIATE R&B

## 2020-08-04 PROCEDURE — 99232 SBSQ HOSP IP/OBS MODERATE 35: CPT | Performed by: PSYCHIATRY & NEUROLOGY

## 2020-08-04 PROCEDURE — 2580000003 HC RX 258: Performed by: PHYSICIAN ASSISTANT

## 2020-08-04 PROCEDURE — 84295 ASSAY OF SERUM SODIUM: CPT

## 2020-08-04 PROCEDURE — 82947 ASSAY GLUCOSE BLOOD QUANT: CPT

## 2020-08-04 PROCEDURE — 36415 COLL VENOUS BLD VENIPUNCTURE: CPT

## 2020-08-04 PROCEDURE — APPSS30 APP SPLIT SHARED TIME 16-30 MINUTES: Performed by: PHYSICIAN ASSISTANT

## 2020-08-04 RX ORDER — AMANTADINE HYDROCHLORIDE 100 MG/1
100 CAPSULE, GELATIN COATED ORAL DAILY
Status: DISCONTINUED | OUTPATIENT
Start: 2020-08-04 | End: 2020-08-04

## 2020-08-04 RX ORDER — SODIUM CHLORIDE 9 MG/ML
INJECTION, SOLUTION INTRAVENOUS CONTINUOUS
Status: DISCONTINUED | OUTPATIENT
Start: 2020-08-04 | End: 2020-08-04

## 2020-08-04 RX ORDER — SODIUM CHLORIDE 1000 MG
1 TABLET, SOLUBLE MISCELLANEOUS
Qty: 6 TABLET | Refills: 0 | Status: SHIPPED | OUTPATIENT
Start: 2020-08-04 | End: 2020-08-05

## 2020-08-04 RX ORDER — SODIUM CHLORIDE 1000 MG
2 TABLET, SOLUBLE MISCELLANEOUS
Status: DISCONTINUED | OUTPATIENT
Start: 2020-08-04 | End: 2020-08-04

## 2020-08-04 RX ORDER — SODIUM CHLORIDE 1000 MG
1 TABLET, SOLUBLE MISCELLANEOUS
Status: DISCONTINUED | OUTPATIENT
Start: 2020-08-04 | End: 2020-08-06 | Stop reason: HOSPADM

## 2020-08-04 RX ORDER — CIPROFLOXACIN 500 MG/1
500 TABLET, FILM COATED ORAL EVERY 12 HOURS SCHEDULED
Qty: 10 TABLET | Refills: 0 | Status: SHIPPED | OUTPATIENT
Start: 2020-08-04 | End: 2020-08-05

## 2020-08-04 RX ORDER — METHOCARBAMOL 500 MG/1
500 TABLET, FILM COATED ORAL 4 TIMES DAILY
Qty: 40 TABLET | Refills: 0 | Status: SHIPPED | OUTPATIENT
Start: 2020-08-04 | End: 2020-08-05

## 2020-08-04 RX ORDER — OXYCODONE HYDROCHLORIDE 5 MG/1
5 TABLET ORAL EVERY 4 HOURS PRN
Qty: 4 TABLET | Refills: 0 | Status: SHIPPED | OUTPATIENT
Start: 2020-08-04 | End: 2020-08-07

## 2020-08-04 RX ORDER — CIPROFLOXACIN 500 MG/1
500 TABLET, FILM COATED ORAL EVERY 12 HOURS SCHEDULED
Status: DISCONTINUED | OUTPATIENT
Start: 2020-08-04 | End: 2020-08-06 | Stop reason: HOSPADM

## 2020-08-04 RX ORDER — ATORVASTATIN CALCIUM 20 MG/1
20 TABLET, FILM COATED ORAL NIGHTLY
Qty: 30 TABLET | Refills: 3 | Status: SHIPPED | OUTPATIENT
Start: 2020-08-04

## 2020-08-04 RX ORDER — METHOCARBAMOL 500 MG/1
500 TABLET, FILM COATED ORAL 4 TIMES DAILY
Status: DISCONTINUED | OUTPATIENT
Start: 2020-08-04 | End: 2020-08-06 | Stop reason: HOSPADM

## 2020-08-04 RX ADMIN — METHOCARBAMOL TABLETS 500 MG: 750 TABLET, COATED ORAL at 20:13

## 2020-08-04 RX ADMIN — SODIUM CHLORIDE TAB 1 GM 1 G: 1 TAB at 18:11

## 2020-08-04 RX ADMIN — PRIMIDONE 50 MG: 50 TABLET ORAL at 20:13

## 2020-08-04 RX ADMIN — FINASTERIDE 5 MG: 5 TABLET, FILM COATED ORAL at 11:04

## 2020-08-04 RX ADMIN — ATORVASTATIN CALCIUM 20 MG: 20 TABLET, FILM COATED ORAL at 20:12

## 2020-08-04 RX ADMIN — OXYCODONE HYDROCHLORIDE 10 MG: 5 TABLET ORAL at 11:05

## 2020-08-04 RX ADMIN — CITALOPRAM 20 MG: 20 TABLET, FILM COATED ORAL at 11:04

## 2020-08-04 RX ADMIN — CIPROFLOXACIN 500 MG: 500 TABLET ORAL at 20:13

## 2020-08-04 RX ADMIN — AMANTADINE HYDROCHLORIDE 100 MG: 100 CAPSULE ORAL at 11:05

## 2020-08-04 RX ADMIN — METHOCARBAMOL TABLETS 500 MG: 750 TABLET, COATED ORAL at 18:11

## 2020-08-04 RX ADMIN — Medication 10 ML: at 20:13

## 2020-08-04 RX ADMIN — CARBIDOPA AND LEVODOPA 1 TABLET: 25; 100 TABLET, EXTENDED RELEASE ORAL at 11:11

## 2020-08-04 RX ADMIN — CARBIDOPA AND LEVODOPA 1 TABLET: 25; 100 TABLET, EXTENDED RELEASE ORAL at 13:56

## 2020-08-04 RX ADMIN — SODIUM CHLORIDE TAB 1 GM 1 G: 1 TAB at 11:06

## 2020-08-04 RX ADMIN — ACETAMINOPHEN 1000 MG: 500 TABLET ORAL at 06:04

## 2020-08-04 RX ADMIN — PRIMIDONE 50 MG: 50 TABLET ORAL at 11:03

## 2020-08-04 RX ADMIN — OXYCODONE HYDROCHLORIDE 10 MG: 5 TABLET ORAL at 06:03

## 2020-08-04 RX ADMIN — CARBIDOPA AND LEVODOPA 3 TABLET: 25; 100 TABLET ORAL at 11:12

## 2020-08-04 RX ADMIN — METHOCARBAMOL TABLETS 500 MG: 750 TABLET, COATED ORAL at 13:56

## 2020-08-04 RX ADMIN — CARBIDOPA AND LEVODOPA 2 TABLET: 25; 100 TABLET ORAL at 18:11

## 2020-08-04 RX ADMIN — DOCUSATE SODIUM 100 MG: 100 CAPSULE, LIQUID FILLED ORAL at 11:04

## 2020-08-04 RX ADMIN — OXYCODONE HYDROCHLORIDE 10 MG: 5 TABLET ORAL at 20:12

## 2020-08-04 RX ADMIN — METHOCARBAMOL TABLETS 500 MG: 750 TABLET, COATED ORAL at 11:03

## 2020-08-04 RX ADMIN — PROPRANOLOL HYDROCHLORIDE 60 MG: 60 CAPSULE, EXTENDED RELEASE ORAL at 20:13

## 2020-08-04 RX ADMIN — AMANTADINE HYDROCHLORIDE 100 MG: 100 CAPSULE ORAL at 20:13

## 2020-08-04 RX ADMIN — Medication 10 ML: at 11:02

## 2020-08-04 RX ADMIN — CARBIDOPA AND LEVODOPA 1 TABLET: 25; 100 TABLET, EXTENDED RELEASE ORAL at 05:58

## 2020-08-04 RX ADMIN — STANDARDIZED SENNA CONCENTRATE AND DOCUSATE SODIUM 1 TABLET: 8.6; 5 TABLET ORAL at 11:03

## 2020-08-04 RX ADMIN — VANCOMYCIN HYDROCHLORIDE 1250 MG: 10 INJECTION, POWDER, LYOPHILIZED, FOR SOLUTION INTRAVENOUS at 05:58

## 2020-08-04 RX ADMIN — SENNOSIDES 8.6 MG: 8.6 TABLET, FILM COATED ORAL at 11:03

## 2020-08-04 RX ADMIN — CIPROFLOXACIN 500 MG: 500 TABLET ORAL at 11:02

## 2020-08-04 RX ADMIN — ACETAMINOPHEN 1000 MG: 500 TABLET ORAL at 20:12

## 2020-08-04 RX ADMIN — CARBIDOPA AND LEVODOPA 2 TABLET: 25; 100 TABLET ORAL at 13:54

## 2020-08-04 RX ADMIN — ACETAMINOPHEN 1000 MG: 500 TABLET ORAL at 13:54

## 2020-08-04 RX ADMIN — POLYETHYLENE GLYCOL 3350 17 G: 17 POWDER, FOR SOLUTION ORAL at 11:02

## 2020-08-04 RX ADMIN — STANDARDIZED SENNA CONCENTRATE AND DOCUSATE SODIUM 1 TABLET: 8.6; 5 TABLET ORAL at 20:12

## 2020-08-04 RX ADMIN — DOXAZOSIN 2 MG: 2 TABLET ORAL at 20:13

## 2020-08-04 RX ADMIN — CARBIDOPA AND LEVODOPA 3 TABLET: 25; 100 TABLET ORAL at 05:58

## 2020-08-04 RX ADMIN — CARBIDOPA AND LEVODOPA 1 TABLET: 25; 100 TABLET, EXTENDED RELEASE ORAL at 18:11

## 2020-08-04 ASSESSMENT — PAIN DESCRIPTION - FREQUENCY: FREQUENCY: CONTINUOUS

## 2020-08-04 ASSESSMENT — PAIN DESCRIPTION - ONSET: ONSET: ON-GOING

## 2020-08-04 ASSESSMENT — PAIN SCALES - GENERAL
PAINLEVEL_OUTOF10: 7
PAINLEVEL_OUTOF10: 6
PAINLEVEL_OUTOF10: 8
PAINLEVEL_OUTOF10: 10
PAINLEVEL_OUTOF10: 3

## 2020-08-04 ASSESSMENT — PAIN DESCRIPTION - DESCRIPTORS: DESCRIPTORS: DISCOMFORT

## 2020-08-04 ASSESSMENT — PAIN DESCRIPTION - LOCATION: LOCATION: NECK

## 2020-08-04 ASSESSMENT — PAIN DESCRIPTION - PAIN TYPE: TYPE: ACUTE PAIN;SURGICAL PAIN

## 2020-08-04 NOTE — CARE COORDINATION
Left voicemail with Seema Villalobos from [facility] at [address] her that updated PT OT notes are in and to call me CM when there start precert. [phone] received call from Seema Villalobos, they have precert good thru today. If not discharged today they will need to start a new precert. [address] Cynthia with [facility],  They have given the bed away for today as they had not heard about the discharge. She informs me that they will be sending updated clinicals in the AM to update insurance for precert.

## 2020-08-04 NOTE — PROGRESS NOTES
Neurology Resident Progress Note      SUBJECTIVE:  This is a 81 y.o.  male admitted 7/29/2020 for [facility] (subarachnoid hemorrhage) ([facility] [I60.9]  This is a follow-up neurology progress note. The patient was seen and examined and the chart was reviewed. There were no acute events overnight. Patient seen and examined. No acute issues overnight. Hemodynamically and vitally stable. Afebrile. Labs reviewed. Underwent C1-C2 laminectomy, C4-C5 laminectomy along with fusion and segmental fixation. Urine growing MSSA. Changed vancomycin to cipro 500 mg BID for 5 days. ROS  Constitutional: no fever, chills, fatigue  HENT: No change in vision or hearing   Respiratory: No cough, SOB, wheezing. Cardiovascular:  No chest pain, palpitations, leg swelling. Gastrointestinal: No nausea, vomiting, diarrhea. Genitourinary: No increased frequency, urgency. Musculoskeletal: No myalgia or arthralgia. Skin: No rashes or scarring or bruises. Neurological: No headache, paresthesia, or focal weakness. Endo/Heme/Allergies: Negative for itchy eyes or runny nose. Psychiatric/Behavioral: No anxiety or depressed mood.      HPI  See H&P     methocarbamol  500 mg Oral 4x Daily    ciprofloxacin  500 mg Oral 2 times per day    IV syringe builder   Intravenous Once    sodium chloride flush  10 mL Intravenous 2 times per day    sennosides-docusate sodium  1 tablet Oral BID    sodium chloride  1 g Oral TID WC    carbidopa-levodopa  1 tablet Oral 4x Daily    carbidopa-levodopa  3 tablet Oral BID    carbidopa-levodopa  2 tablet Oral BID    doxazosin  2 mg Oral Nightly    propranolol  60 mg Oral BID    propranolol  40 mg Oral BID    docusate sodium  100 mg Oral Daily    polyethylene glycol  17 g Oral Daily    finasteride  5 mg Oral Daily    citalopram  20 mg Oral Daily    primidone  50 mg Oral BID    amantadine  100 mg Oral BID    atorvastatin  20 mg Oral Nightly    sodium chloride flush  10 mL Intravenous 2 times per day    acetaminophen  1,000 mg Oral 3 times per day    senna  1 tablet Oral Daily       Past Medical History:   Diagnosis Date    BPH (benign prostatic hyperplasia)     Cancer (HCC)     basal cell carcinoma    Cervical spondylolysis     Depression     Hyperlipidemia     Hypertension     Neck pain     Neuropathy     Orofacial dyskinesia     Osteoarthritis     Parkinson's disease ([address]        Past Surgical History:   Procedure Laterality Date    CERVICAL FUSION  08/02/2020    C4-5    CERVICAL FUSION N/A 8/2/2020    C1-C2 LAMINECTOMY, C4-5 LAMINECTOMY WITH C5 LEFT FORAMINOTOMY, INSTRUMENTED FUSION C4-5 performed by Cisco Salvador DO at [address]  08/02/2020    C1-2/C 4-5    JOINT REPLACEMENT         PHYSICAL EXAM:      Blood pressure (!) 138/58, pulse 72, temperature 97.5 °F (36.4 °C), temperature source Oral, resp. rate 21, height 5' 6\" (1.676 m), weight 164 lb 10.9 oz (74.7 kg), SpO2 97 %. General Examination    General Resting comfortably in bed   Head Normocephalic, without obvious abnormality   Neck Supple, symmetrical. Good ROM. No midline or paraspinal tenderness. Lungs Respirations unlabored, no wheezing   Chest Wall No deformity   Heart RRR, no murmur   Abdomen Soft. Non-tender, non-distended   Extremities No cyanosis or edema or warmth. Pulses 2+ and symmetric   Skin: Skin  turgor normal, no rashes or lesions     Mental status  Speech Alert. Oriented to person, place, and time. Speech is fluent without paraphasic errors  Good repetition and naming  Can do 1 step, 2 step, and cross-body commands  Can spell world backwards. Language appropriate. No hallucinations or delusions. No SI/HI. Cranial nerves   II - VFF, visual threat intact  III, IV, VI - extra-ocular muscles full. No nystagmus. Pupils symmetric and responsive.    V - sensation symmetric         VII -mild decreased left NLF  VIII - intact hearing to conversational tone IX, X - symmetrical palate elevation   XI - 5/5 strength symmetric  XII - tongue midline   Motor function  Strength: Left arm and leg 1/5 strength  Right arm and leg 5 out of 5 strength  -Rigidity bilateral upper and lower extremities  Bulk: grossly normal no atrophy  Tone: symmetric b/l arms and legs  Abnormal movements: No abnormal movements or tremor   Sensory function Symmetric to touch in all extremities bilaterally   Cerebellar No dysmetria or dysdiadochocinesia    Reflex function DTR: Reflexes are brisk on left side  Babinski b/l plantar downgoing   Gait                  Not assessed       Investigations:      Laboratory Testing:  Recent Results (from the past 24 hour(s))   VITAMIN B12    Collection Time: 08/03/20  9:01 PM   Result Value Ref Range    Vitamin B-12 416 232 - 1245 pg/mL   CBC WITH AUTO DIFFERENTIAL    Collection Time: 08/04/20  4:43 AM   Result Value Ref Range    WBC 7.9 3.5 - 11.3 k/uL    RBC 3.34 (L) 4.21 - 5.77 m/uL    Hemoglobin 10.7 (L) 13.0 - 17.0 g/dL    Hematocrit 33.1 (L) 40.7 - 50.3 %    MCV 99.1 82.6 - 102.9 fL    MCH 32.0 25.2 - 33.5 pg    MCHC 32.3 28.4 - 34.8 g/dL    RDW 14.3 11.8 - 14.4 %    Platelets 229 (L) 896 - 453 k/uL    MPV 11.7 8.1 - 13.5 fL    NRBC Automated 0.0 0.0 per 100 WBC    Differential Type NOT REPORTED     WBC Morphology NOT REPORTED     RBC Morphology NOT REPORTED     Platelet Estimate NOT REPORTED     Seg Neutrophils 72 (H) 36 - 65 %    Lymphocytes 11 (L) 24 - 43 %    Monocytes 15 (H) 3 - 12 %    Eosinophils % 0 (L) 1 - 4 %    Basophils 0 0 - 2 %    Immature Granulocytes 0 0 %    Segs Absolute 5.70 1.50 - 8.10 k/uL    Absolute Lymph # 0.89 (L) 1.10 - 3.70 k/uL    Absolute Mono # 1.21 (H) 0.10 - 1.20 k/uL    Absolute Eos # <0.03 0.00 - 0.44 k/uL    Basophils Absolute <0.03 0.00 - 0.20 k/uL    Absolute Immature Granulocyte 0.03 0.00 - 0.30 k/uL   Basic Metabolic Panel w/ Reflex to MG    Collection Time: 08/04/20  4:43 AM   Result Value Ref Range    Glucose 111 (H) 70 - 99 mg/dL    BUN 18 8 - 23 mg/dL    CREATININE 0.48 (L) 0.70 - 1.20 mg/dL    Bun/Cre Ratio NOT REPORTED 9 - 20    Calcium 8.3 (L) 8.6 - 10.4 mg/dL    Sodium 128 (L) 135 - 144 mmol/L    Potassium 4.4 3.7 - 5.3 mmol/L    Chloride 97 (L) 98 - 107 mmol/L    CO2 23 20 - 31 mmol/L    Anion Gap 8 (L) 9 - 17 mmol/L    GFR Non-African American >60 >60 mL/min    GFR African American >60 >60 mL/min    GFR Comment          GFR Staging NOT REPORTED        Imaging/Diagnostics:  Xr Hand Right (min 3 Views)    Result Date: 7/30/2020  EXAMINATION: THREE XRAY VIEWS OF THE RIGHT HAND 7/30/2020 11:44 am COMPARISON: None. HISTORY: ORDERING SYSTEM PROVIDED HISTORY: hand pain TECHNOLOGIST PROVIDED HISTORY: hand pain Reason for Exam: hand pain Acuity: Unknown FINDINGS: Paralyzed osseous structure triscaphe and thumb CMC degenerative change. Degenerative changes of the 1st MCP and IP joint. Degenerative changes of the 3rd PIP joint. hook osteophytes of the 2nd 3rd metacarpals. Degenerative changes of the hand. Xr Foot Left (min 3 Views)    Result Date: 7/30/2020  EXAMINATION: THREE XRAY VIEWS OF THE LEFT FOOT 7/30/2020 11:44 am COMPARISON: None HISTORY: ORDERING SYSTEM PROVIDED HISTORY: foot pain TECHNOLOGIST PROVIDED HISTORY: foot pain Reason for Exam: foot pain Acuity: Unknown Acute left foot pain. Initial encounter. FINDINGS: Moderate 1st MTP degenerative changes. Moderate midfoot degenerative changes. Lisfranc alignment is maintained. Hammertoe deformity of the 2nd digit. 1. No acute radiographic finding to account for patient's left foot pain. 2. Moderate 1st MTP degenerative changes. 3. Moderate midfoot degenerative changes. 4. Hammertoe deformity of the 2nd digit.      Ct Head Wo Contrast    Result Date: 7/30/2020  EXAMINATION: CT OF THE HEAD WITHOUT CONTRAST,  7/29/2020 1:40 pm TECHNIQUE: CT of the head was performed without the administration of intravenous contrast. Dose modulation, iterative reconstruction, and/or weight based adjustment of the mA/kV was utilized to reduce the radiation dose to as low as reasonably achievable. COMPARISON: None HISTORY: ORDERING SYSTEM PROVIDED HISTORY: Stroke, L sided weakness. LKW 1210 pm TECHNOLOGIST PROVIDED HISTORY: Stroke, L sided weakness. LKW 1210 pm Reason for Exam: Stroke, L sided weakness. LKW 1210 pm Acuity: Unknown Type of Exam: Unknown Initial evaluation FINDINGS: BRAIN/VENTRICLES: There is mild cerebral atrophy. There is atherosclerotic calcification of the cavernous carotid arteries. There are areas of hypoattenuation in the periventricular white matter and centrum semiovale that are likely related to chronic small vessel ischemic disease. There is a focal area of hypoattenuation in the left frontal lobe that likely represents an infarct of indeterminate age. There is no midline shift or mass effect. There is a subtle questionable area of hyperdensity in one of the sulci over the high right parietal region. While this may be artifact, very subtle subarachnoid hemorrhage cannot be excluded. ORBITS: The visualized portion of the orbits demonstrate no acute abnormality. SINUSES:  The visualized paranasal sinuses and mastoid air cells are clear. SOFT TISSUES/SKULL:  No acute abnormality of the visualized skull or soft tissues. Cerebral atrophy. Chronic small vessel ischemic changes. Area of hypoattenuation in the left frontal periventricular white matter which likely represents an infarct of indeterminate age. Questionable subtle hyperattenuation over the right frontal convexity which is suspicious for a tiny subarachnoid hemorrhage. Report was called and discussed with Dr. Nadja Camacho, 07/29/2020 at 2:16 p.m.      Ct Cervical Spine Wo Contrast    Result Date: 7/29/2020  EXAMINATION: CT OF THE CERVICAL SPINE WITHOUT CONTRAST 7/29/2020 7:05 pm TECHNIQUE: CT of the cervical spine was performed without the administration of intravenous contrast. Multiplanar reformatted images are provided for review. Dose modulation, iterative reconstruction, and/or weight based adjustment of the mA/kV was utilized to reduce the radiation dose to as low as reasonably achievable. COMPARISON: None HISTORY: ORDERING SYSTEM PROVIDED HISTORY: trauma TECHNOLOGIST PROVIDED HISTORY: trauma Reason for Exam: fall Acuity: Acute Type of Exam: Initial FINDINGS: No acute fracture. No subluxation. No prevertebral soft tissue swelling. Severe multilevel degenerative changes. Abnormal C1-C2 positioning with widening on the left. Abnormal C1-C2 relationship which may be related to positioning. If there is concern for ligamentous injury, MRI is recommended. No acute fracture. Severe multilevel degenerative changes. Ct Thoracic Spine Wo Contrast    Result Date: 7/29/2020  EXAMINATION: CT OF THE THORACIC SPINE WITHOUT CONTRAST; CT OF THE LUMBAR SPINE WITHOUT CONTRAST 7/29/2020 TECHNIQUE: CT of the thoracic spine was performed without the administration of intravenous contrast. Multiplanar reformatted images are provided for review. Dose modulation, iterative reconstruction, and/or weight based adjustment of the mA/kV was utilized to reduce the radiation dose to as low as reasonably achievable.; CT of the lumbar spine was performed without the administration of intravenous contrast. Multiplanar reformatted images are provided for review. Dose modulation, iterative reconstruction, and/or weight based adjustment of the mA/kV was utilized to reduce the radiation dose to as low as reasonably achievable.  COMPARISON: CT chest abdomen pelvis 07/29/2020 HISTORY: ORDERING SYSTEM PROVIDED HISTORY: trauma TECHNOLOGIST PROVIDED HISTORY: trauma Reason for Exam: fall Acuity: Acute Type of Exam: Initial; ORDERING SYSTEM PROVIDED HISTORY: TRAUMA TECHNOLOGIST PROVIDED HISTORY: trauma Reason for Exam: fall Acuity: Acute Type of Exam: Initial FINDINGS: BONES/ALIGNMENT: There is no acute fracture or traumatic is no significant disc herniation. There is mild spinal canal narrowing, moderate left foraminal narrowing. There is mild bilateral facet arthrosis with fusion of the bilateral facet joints. C3-C4: There is no significant disc herniation. There is mild spinal canal narrowing, moderate bilateral foraminal narrowing. There is moderate left facet arthrosis with fusion of the facet joint. C4-C5: There is anterolisthesis, disc bulge, severe left and mild right facet arthrosis, with mild-to-moderate spinal canal narrowing, mild spinal cord compression, moderate to severe bilateral foraminal narrowing. C5-C6: There is disc bulge, severe left and mild right facet arthrosis, with minimal spinal canal narrowing, moderate to severe bilateral foraminal narrowing. C6-C7: There is disc bulge, moderate left and mild right facet arthrosis, with mild spinal canal narrowing, and moderate bilateral foraminal narrowing. C7-T1: There is no significant disc protrusion, spinal canal stenosis or neural foraminal narrowing. Increased T2 signal in the cervical spinal cord at C1-2 level, likely related to spinal cord edema. Increased T2 signal in the asymmetrically widened right lateral atlanto-dens interval, nonspecific. Injury of the alar ligaments cannot be excluded. Degenerative disc disease as described above, exacerbating congenitally narrow cervical spinal canal. Spinal canal narrowing, moderate at C1-2 with mild spinal cord compression, mild to moderate at C4-5 with mild spinal cord compression, mild at C2-3, C3-4 and C6-7, minimal at C5-6. Foraminal narrowing, moderate to severe at bilateral C4-5 and bilateral C5-6, moderate at left C2-3, bilateral C3-4 and bilateral C6-7. 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      Xr Shoulder Left (min 2 Views)    Result Date: 7/30/2020  EXAMINATION: TWO XRAY VIEWS OF THE LEFT SHOULDER 7/30/2020 11:44 am COMPARISON: None HISTORY: ORDERING SYSTEM PROVIDED HISTORY: shoulder pain TECHNOLOGIST PROVIDED HISTORY: shoulder pain Reason for Exam: shoulder pain Acuity: Unknown Acute left shoulder pain. FINDINGS: The humeral head aligns normally with the glenoid fossae. Coracoclavicular and acromioclavicular interval are within normal range. No visible fracture. No dislocation. There is a calcified granuloma in the left upper lobe. 1. No acute radiographic finding to account for patient's left shoulder pain. Xr Chest Portable    Result Date: 7/29/2020  EXAMINATION: ONE XRAY VIEW OF THE CHEST 7/29/2020 3:56 pm COMPARISON: None. HISTORY: ORDERING SYSTEM PROVIDED HISTORY: pCO2 high, no SOB TECHNOLOGIST PROVIDED HISTORY: pCO2 high, no SOB FINDINGS: No focal consolidation. Mild cardiomegaly. No pulmonary edema. Calcified granuloma. No acute findings. Cta Head Neck W Contrast    Result Date: 7/30/2020  EXAMINATION: CTA OF THE HEAD AND NECK WITH CONTRAST 7/29/2020 1:40 pm: TECHNIQUE: CTA of the head and neck was performed with the administration of intravenous contrast. Multiplanar reformatted images are provided for review. MIP images are provided for review. Stenosis of the internal carotid arteries measured using NASCET criteria. Dose modulation, iterative reconstruction, and/or weight based adjustment of the mA/kV was utilized to reduce the radiation dose to as low as reasonably achievable. COMPARISON: None. HISTORY: ORDERING SYSTEM PROVIDED HISTORY: stroke symptoms, L sided weakness, LKW 1210pm TECHNOLOGIST PROVIDED HISTORY: stroke symptoms, L sided weakness, LKW 1210pm Reason for Exam: stroke, L sided weakness. LKW 1210pm Acuity: Unknown Type of Exam: Unknown Initial evaluation. FINDINGS: CTA NECK: AORTIC ARCH/ARCH VESSELS: No significant abnormality of the aortic arch is identified.   Incidental note is made of the left vertebral artery originating directly from the aortic arch which is a normal anatomic directly from the aortic arch which is a normal anatomic variant. No significant abnormality of the intracranial circulation. Incidental note is made of fetal origin of the right posterior cerebral artery which is a normal anatomic variant. Ct Chest Abdomen Pelvis W Contrast    Result Date: 7/30/2020  EXAMINATION: CT OF THE CHEST, ABDOMEN, AND PELVIS WITH CONTRAST 7/29/2020 7:05 pm TECHNIQUE: CT of the chest, abdomen and pelvis was performed with the administration of intravenous contrast. Multiplanar reformatted images are provided for review. Dose modulation, iterative reconstruction, and/or weight based adjustment of the mA/kV was utilized to reduce the radiation dose to as low as reasonably achievable. COMPARISON: None. HISTORY: ORDERING SYSTEM PROVIDED HISTORY: trauma TECHNOLOGIST PROVIDED HISTORY: trauma Reason for Exam: fall Acuity: Acute Type of Exam: Initial Patient fell hitting head when opening front door. FINDINGS: Chest: Mediastinum: No mediastinal adenopathy or acute aortic abnormality. Calcification aortic arch is noted. Mild cardiomegaly. Coronary artery calcification. No pericardial effusion. Lungs/pleura: Mild emphysematous changes. No effusion, focal consolidation, or extrapleural air. Tracheobronchial tree is patent. Left upper lobe granuloma. Soft Tissues/Bones: Non-acute appearing fracture C7 spinous process versus soft tissue ligamentous calcification. No acute appearing osseous abnormality. No axillary adenopathy or acute soft tissue abnormality. Abdomen/Pelvis: Organs: Liver, gallbladder, pancreas, spleen, and adrenals are unremarkable. Kidneys contain multiple bilateral cysts largest in the right kidney of 5 cm and in the left kidney of 3.5 cm. No urinary obstruction. GI/Bowel: No free fluid, free air, bowel obstruction or bowel wall thickening. Increased colonic stool load. Pelvis: Bladder is intact. No abnormal fluid collections, pelvic or inguinal adenopathy.   Bilateral fat containing inguinal hernias are noted without strangulation. Prostate is mildly enlarged. Peritoneum/Retroperitoneum:   No acute aortic abnormality; no aneurysm. No retroperitoneal or mesenteric adenopathy. Bones/Soft Tissues: Degenerative changes are present in the hips and lower lumbar facets. Multilevel degenerative and degenerative disc changes are noted. CT chest: Emphysematous changes. No extrapleural air, effusion or acute pulmonary findings. Atherosclerotic disease. CT abdomen and pelvis: No solid organ injury. No acute abdominopelvic process. Osseous findings as above. Mri Limited Brain    Result Date: 7/30/2020  EXAMINATION: MRI OF THE BRAIN WITHOUT CONTRAST  7/29/2020 3:00 pm TECHNIQUE: Multiplanar multisequence MRI of the brain was performed without the administration of intravenous contrast. COMPARISON: CT head 01/29/2020 HISTORY: ORDERING SYSTEM PROVIDED HISTORY: add GRE. Evalaute for stroke TECHNOLOGIST PROVIDED HISTORY: add GRE. Evalaute for stroke Initial evaluation FINDINGS: INTRACRANIAL STRUCTURES/VENTRICLES: There is a punctate area of restricted diffusion measuring 3 mm medially in the posterior left frontal lobe along the falx that is suggestive of an acute infarct or subacute infarct. There is high signal in some of the sulci over the high right parietal lobe with corresponding low signal on the gradient echo images suggestive of subarachnoid hemorrhage. There is subarachnoid hemorrhage in the pre frontal sulcus. There is diffuse cerebral atrophy. There are chronic small vessel ischemic changes. There are several punctate areas of low signal scattered throughout the brain on the gradient echo images suggestive of previous areas of microhemorrhage. The findings were discussed with Dr. Alona Felton, 07/29/2020 at 3:18 p.m. Punctate 3 mm acute to subacute infarct in the posterior right frontal lobe adjacent to the falx. No other areas of restricted diffusion.  There is subarachnoid hemorrhage over the high right parietal lobe and posterior right frontal lobe. Cerebral atrophy. Chronic small vessel ischemic changes. Assessment & Differential Dx:      Primary Problem  <principal problem not specified>    Active Hospital Problems    Diagnosis Date Noted    Cerebrovascular accident (CVA) ([address] [I63.9]     Acute cerebral infarction ([address] [I63.9] 07/30/2020    Left hemiparesis ([address] [G81.94] 07/30/2020    Parkinson disease ([address] Jessenia Madi 07/30/2020    Subarachnoid hemorrhage ([address] [I60.9] 07/29/2020       Case of 52-year-old WM with a fall story along with left arm and left leg weakness, Hx of Parkinson with baseline gait apraxia on Sinemet 25/100 twice daily, symmetrel 100 po BID and mysoline 100 mg BID, reporting arthritic right knee to have upcoming knee replacement,  -He hit his head to the wall, no LOC,  -Head CT with right parietal frontal cortical subarachnoid hemorrhage  With possible small acute right parietal infarction with bilateral chronic periventricular small vessel ischemia  -Head cervical spine: Abnormal C1-2 positioning  -CT thoracic spine:  Moderate degenerative changes  -CT lumbar spine: Severe degenerative changes  -CTA head and neck: Left ICA 50-60% stenosis, right ICA 20% stenosis  -MRI head: Right parietal frontal cortical subarachnoid hemorrhage with acute right high parietal infarction with bilateral chronic periventricular small vessel ischemia      Impression:  -Fall with a posttraumatic subarachnoid hemorrhage  -right cerebral infarction left hemiparesis  -Parkinson disease  -unstable cervical spine  -possible underlying dementia    Plan:     -MRI brain W0: Punctuate 3 mm acute to subacute infarct in the posterior right frontal lobe adjacent to the falx, there is subarachnoid hemorrhage over the high right parietal lobe and posterior right frontal lobe, cerebral atrophy  -No TPA due to traumatic subarachnoid hemorrhage  -Trauma surgery and neurosurgery consulted: No neurosurgery intervention needed,  -MRI cervical spine W 0 contrast: Increased T2 signal in the cervical spine cord at C1-2 level likely 2 spinal cord edema, increased 3 to spinal in the soft symmetry widened right lateral atlanto dens, degenerative disc disease, spinal canal narrowing, moderate at C1-2 with mild spinal cord compression-underwent laminectomy and fusion  -TTE with bubble study: Negative   -Folic acid 1 mg twice daily  -Aspirin 81 mg  -continue atorvastatin 20 mg nightly  -Hold all antiplatelets and anticoagulants due SAH  -Cervical collar- as per trauma/nerosurgery   -PT/OT/speech  -Urine growing staph aureus.  MSSA, started on Cipro for 5 days.       DC to rehab     Anisa Black MD  PGY-2, Internal medicine resident/Neurologu service   [facility] [address] [place], [place]

## 2020-08-04 NOTE — PROGRESS NOTES
Occupational Therapy    Occupational Therapy Not Seen Note    DATE: 2020  Name: Adal Morales  : 1935  MRN: [id number]    Patient not available for Occupational Therapy due to: Other: RRT Called.      Next Scheduled Treatment: 20    Electronically signed by YUN Dorsey on 2020 at 2:01 PM

## 2020-08-04 NOTE — PROGRESS NOTES
Neurosurgery ARYA/Resident    Daily Progress Note   Chief Complaint   Patient presents with    Altered Mental Status     Last known well 1210     8/4/2020  11:57 AM    Chart reviewed. No acute events overnight. No new complaints. Vitals:    08/03/20 2047 08/04/20 0000 08/04/20 0400 08/04/20 0847   BP: (!) 128/56 (!) 122/51 (!) 138/58 (!) 141/57   Pulse: 72 68 72 64   Resp:  21 21 16   Temp:  98 °F (36.7 °C) 97.5 °F (36.4 °C) 98.2 °F (36.8 °C)   TempSrc:  Oral Oral Oral   SpO2:       Weight:       Height:           PE:   AOx3   CNII-XII intact   PERRL, EOMI   Motor   L deltoid 1/5; R deltoid 5/5  L biceps 3/5; R biceps 5/5  L triceps 3/5; R triceps 5/5  L HG 3/5, R HG 5/5     L iliopsoas 2/5 , R iliopsoas 5/5  L quadriceps 2/5; R quadriceps 5/5  L Dorsiflexion 4/5; R dorsiflexion 5/5  L Plantarflexion 4/5; R plantarflexion 5/5  L EHL 4/5; R EHL 5/5    Sensation intact     Drain output 80mL/12hrs  Incision dressing intact, clean and dry      Lab Results   Component Value Date    WBC 7.9 08/04/2020    HGB 10.7 (L) 08/04/2020    HCT 33.1 (L) 08/04/2020     (L) 08/04/2020    CHOL 146 07/30/2020    TRIG 48 07/30/2020    HDL 65 07/30/2020     (L) 08/04/2020    K 4.4 08/04/2020    CL 97 (L) 08/04/2020    CREATININE 0.48 (L) 08/04/2020    BUN 18 08/04/2020    CO2 23 08/04/2020    TSH 1.44 07/29/2020    INR 1.0 08/03/2020    LABA1C 5.2 07/30/2020       Radiology     AP/LAT cervical XR    8/4/2020 9:21am  Postsurgical changes status post posterior cervical fusion at C4-5. Grade 1 anterolisthesis of C4 relative to C5 and C5 relative to C6, not   significantly changed. Severe multilevel degenerative facet arthropathy.          A/P  81 y.o. male who presents with acute spinal cord injury, cervical stenosis C4-5 spondylolisthesis  POD 2 s/p C1-2, C4-5 laminectomy with C5 left foraminotomy, intrumented fusion C4-5                    - Post op cervical CT and cervical AP/LAT standing xrs obtained              - Discontinue drain today due to low output              - Aspen collar to be worn at all times other than eating  - Ok for dvt ppx  - Neuro checks per floor protocol  - Patient to be up in bedside chair every shift  - PT/OT eval and treat  - Patient approved for [address] [place] [address] rehab facility today, will work towards discharge   - Fluid restrict to 1,500mL daily for SIADH    Please contact neurosurgery with any changes in patients neurologic status.          SHANIKA Dong   11:57 AM EDT

## 2020-08-04 NOTE — FLOWSHEET NOTE
SPIRITUAL CARE DEPARTMENT - [address] [address] [address]  PROGRESS NOTE    Shift date: 08/04/2020  Shift day: Tuesday   Shift # 2    Room # 2445/6910-95   Name: Vero Szymanski            Age: 81 y.o. Gender: male          Anabaptism: Jacki [address] 34 of Taoism:     Referral: Rapid Response    Admit Date & Time: 7/29/2020  1:36 PM    PATIENT/EVENT DESCRIPTION:  Vero Szymanski is a 81 y.o. male and an RRT was called. SPIRITUAL ASSESSMENT/INTERVENTION:   responded to RRT via perfect serve/overhead page.  met with pt after RRT was complete. Pt had a hard time speaking to  as he was in neck brace. Pt did say he was feeling better and thanked  for visiting. No spirutal needs at this time. SPIRITUAL CARE FOLLOW-UP PLAN:  Chaplains will remain available to offer spiritual and emotional support as needed. Electronically signed by Cheyenne Palacio Resident, on 8/4/2020 at 3:39 PM.  [facility]  [phone]       08/04/20 1400   Encounter Summary   Services provided to: Patient   Referral/Consult From: Multi-disciplinary team   Support System Children   Continue Visiting   (08/04/2020)   Complexity of Encounter High   Length of Encounter 1 hour   Spiritual Assessment Completed Yes   Crisis   Type Rapid response   Assessment Passive; Approachable; Unable to respond   Intervention Active listening;Sustaining presence/ Ministry of presence   Outcome Expressed gratitude

## 2020-08-04 NOTE — PROGRESS NOTES
Speech Language Pathology  Speech Language Pathology  [facility]    Speech Language Pathology  [facility]    Cognitive Treatment Note    Date: 8/4/2020  Patients Name: Silvana Casey  MRN: [id number]  Diagnosis:   Patient Active Problem List   Diagnosis Code    Acute ischemic stroke (HCC) I63.9    Subarachnoid hemorrhage (HCC) I60.9    Acute cerebral infarction ([facility] I63.9    Left hemiparesis ([facility] G81.94    Parkinson disease ([facility] [address]    Cerebrovascular accident (CVA) ([facility] I63.9       Pain: Endorses pain    Cognitive Treatment    Treatment time: 8810-9850      Subjective: [] Alert [] Cooperative     [] Confused     [] Agitated    [x] Lethargic      Objective/Assessment:  Attention: Pt required frequent verbal prompting to attend to the activity. Recall: Delayed recall with 4 units (chaining word list): 5-Minute Delay: 0/4 independently, increased to 1/4 with moderate verbal prompting    Category Members [place]: 5/15 independently, increased to 9/15 with moderate verbal prompting, increased to 12/15 with maximal verbal prompting    Other: Session ended because pt states he is in pain and is sleepy. Plan:  [x] Continue ST services    [] Discharge from ST:      Discharge recommendations: [] Inpatient Rehab   [] [place]   [] Outpatient Therapy  [] Follow up at trauma clinic   [x] Other: Further therapy recommended at discharge. Treatment completed by: Completed by: Reji Trimble,  Clinician    Cosigned By: Jerrica SCHWARTZ CCC/SLP

## 2020-08-04 NOTE — PROGRESS NOTES
Rapid respond called due to patient stating that he feels bad and noted slight left facial droop as well as left sided weakness. Patient's mentation is more lethargic and slow to respond. Vitals within normal limits as well as blood sugar. Patient to stay on [facility] and a head CT was ordered.

## 2020-08-04 NOTE — PROGRESS NOTES
Physical Therapy  DATE: 2020    NAME: Supriya Begum  MRN: [id number]   : 1935    Patient not seen this date for Physical Therapy due to:  [] Blood transfusion in progress  [] Hemodialysis  []  Patient Declined  [] Spine Precautions   [] Strict Bedrest  [] Surgery/ Procedure  [] Testing      [x] Other Rapid response called @ 1:40pm. Will defer PT at this time. Will check back        [] PT being discontinued at this time. Patient independent. No further needs. [] PT being discontinued at this time as the patient has been transferred to palliative care. No further needs.     Karina Boo, PTA

## 2020-08-04 NOTE — PLAN OF CARE
Problem: Pain:  Goal: Pain level will decrease  Description: Pain level will decrease  8/4/2020 0731 by Ken Abdi RN  Outcome: Ongoing   Medicating per PRN orders

## 2020-08-04 NOTE — PROGRESS NOTES
Writer was informed by day shift that they discontinued the patient's levy but it was never charted. Writer discontinued levy in Epic.

## 2020-08-05 LAB
ABSOLUTE EOS #: 0.03 K/UL (ref 0–0.44)
ABSOLUTE IMMATURE GRANULOCYTE: 0.03 K/UL (ref 0–0.3)
ABSOLUTE LYMPH #: 0.75 K/UL (ref 1.1–3.7)
ABSOLUTE MONO #: 0.89 K/UL (ref 0.1–1.2)
ANION GAP SERPL CALCULATED.3IONS-SCNC: 12 MMOL/L (ref 9–17)
BASOPHILS # BLD: 0 % (ref 0–2)
BASOPHILS ABSOLUTE: <0.03 K/UL (ref 0–0.2)
BUN BLDV-MCNC: 13 MG/DL (ref 8–23)
BUN/CREAT BLD: ABNORMAL (ref 9–20)
CALCIUM IONIZED: 1.17 MMOL/L (ref 1.13–1.33)
CALCIUM SERPL-MCNC: 8.4 MG/DL (ref 8.6–10.4)
CHLORIDE BLD-SCNC: 95 MMOL/L (ref 98–107)
CO2: 22 MMOL/L (ref 20–31)
CREAT SERPL-MCNC: 0.42 MG/DL (ref 0.7–1.2)
DIFFERENTIAL TYPE: ABNORMAL
EOSINOPHILS RELATIVE PERCENT: 0 % (ref 1–4)
GFR AFRICAN AMERICAN: >60 ML/MIN
GFR NON-AFRICAN AMERICAN: >60 ML/MIN
GFR SERPL CREATININE-BSD FRML MDRD: ABNORMAL ML/MIN/{1.73_M2}
GFR SERPL CREATININE-BSD FRML MDRD: ABNORMAL ML/MIN/{1.73_M2}
GLUCOSE BLD-MCNC: 104 MG/DL (ref 70–99)
HCT VFR BLD CALC: 34.5 % (ref 40.7–50.3)
HEMOGLOBIN: 10.8 G/DL (ref 13–17)
IMMATURE GRANULOCYTES: 0 %
LYMPHOCYTES # BLD: 11 % (ref 24–43)
MCH RBC QN AUTO: 32.2 PG (ref 25.2–33.5)
MCHC RBC AUTO-ENTMCNC: 31.3 G/DL (ref 28.4–34.8)
MCV RBC AUTO: 103 FL (ref 82.6–102.9)
MONOCYTES # BLD: 13 % (ref 3–12)
NRBC AUTOMATED: 0 PER 100 WBC
OSMOLALITY URINE: 431 MOSM/KG (ref 80–1300)
PDW BLD-RTO: 13.7 % (ref 11.8–14.4)
PLATELET # BLD: 146 K/UL (ref 138–453)
PLATELET ESTIMATE: ABNORMAL
PMV BLD AUTO: 11.7 FL (ref 8.1–13.5)
POTASSIUM SERPL-SCNC: 4.3 MMOL/L (ref 3.7–5.3)
RBC # BLD: 3.35 M/UL (ref 4.21–5.77)
RBC # BLD: ABNORMAL 10*6/UL
SEG NEUTROPHILS: 76 % (ref 36–65)
SEGMENTED NEUTROPHILS ABSOLUTE COUNT: 5.08 K/UL (ref 1.5–8.1)
SERUM OSMOLALITY: 275 MOSM/KG (ref 275–295)
SODIUM BLD-SCNC: 129 MMOL/L (ref 135–144)
SODIUM BLD-SCNC: 130 MMOL/L (ref 135–144)
SODIUM,UR: 68 MMOL/L
WBC # BLD: 6.8 K/UL (ref 3.5–11.3)
WBC # BLD: ABNORMAL 10*3/UL

## 2020-08-05 PROCEDURE — 6360000002 HC RX W HCPCS: Performed by: STUDENT IN AN ORGANIZED HEALTH CARE EDUCATION/TRAINING PROGRAM

## 2020-08-05 PROCEDURE — 2060000000 HC ICU INTERMEDIATE R&B

## 2020-08-05 PROCEDURE — 6370000000 HC RX 637 (ALT 250 FOR IP): Performed by: PHYSICIAN ASSISTANT

## 2020-08-05 PROCEDURE — 82330 ASSAY OF CALCIUM: CPT

## 2020-08-05 PROCEDURE — 2580000003 HC RX 258: Performed by: INTERNAL MEDICINE

## 2020-08-05 PROCEDURE — 97129 THER IVNTJ 1ST 15 MIN: CPT

## 2020-08-05 PROCEDURE — 36415 COLL VENOUS BLD VENIPUNCTURE: CPT

## 2020-08-05 PROCEDURE — 83935 ASSAY OF URINE OSMOLALITY: CPT

## 2020-08-05 PROCEDURE — 84295 ASSAY OF SERUM SODIUM: CPT

## 2020-08-05 PROCEDURE — 99232 SBSQ HOSP IP/OBS MODERATE 35: CPT | Performed by: PSYCHIATRY & NEUROLOGY

## 2020-08-05 PROCEDURE — 83930 ASSAY OF BLOOD OSMOLALITY: CPT

## 2020-08-05 PROCEDURE — 2580000003 HC RX 258: Performed by: PHYSICIAN ASSISTANT

## 2020-08-05 PROCEDURE — 97530 THERAPEUTIC ACTIVITIES: CPT

## 2020-08-05 PROCEDURE — 84300 ASSAY OF URINE SODIUM: CPT

## 2020-08-05 PROCEDURE — 6370000000 HC RX 637 (ALT 250 FOR IP): Performed by: INTERNAL MEDICINE

## 2020-08-05 PROCEDURE — APPSS15 APP SPLIT SHARED TIME 0-15 MINUTES: Performed by: NURSE PRACTITIONER

## 2020-08-05 PROCEDURE — 6370000000 HC RX 637 (ALT 250 FOR IP): Performed by: STUDENT IN AN ORGANIZED HEALTH CARE EDUCATION/TRAINING PROGRAM

## 2020-08-05 PROCEDURE — 97110 THERAPEUTIC EXERCISES: CPT

## 2020-08-05 PROCEDURE — 85025 COMPLETE CBC W/AUTO DIFF WBC: CPT

## 2020-08-05 PROCEDURE — 97535 SELF CARE MNGMENT TRAINING: CPT

## 2020-08-05 PROCEDURE — 80048 BASIC METABOLIC PNL TOTAL CA: CPT

## 2020-08-05 RX ORDER — CIPROFLOXACIN 500 MG/1
500 TABLET, FILM COATED ORAL EVERY 12 HOURS SCHEDULED
Qty: 10 TABLET | Refills: 0 | Status: SHIPPED | OUTPATIENT
Start: 2020-08-05 | End: 2020-08-10

## 2020-08-05 RX ORDER — AMLODIPINE BESYLATE 10 MG/1
10 TABLET ORAL DAILY
Qty: 30 TABLET | Refills: 5 | Status: SHIPPED | OUTPATIENT
Start: 2020-08-05

## 2020-08-05 RX ORDER — SODIUM CHLORIDE 9 MG/ML
INJECTION, SOLUTION INTRAVENOUS CONTINUOUS
Status: DISCONTINUED | OUTPATIENT
Start: 2020-08-05 | End: 2020-08-06 | Stop reason: HOSPADM

## 2020-08-05 RX ORDER — AMLODIPINE BESYLATE 5 MG/1
5 TABLET ORAL DAILY
Status: DISCONTINUED | OUTPATIENT
Start: 2020-08-05 | End: 2020-08-06

## 2020-08-05 RX ORDER — SODIUM CHLORIDE 1000 MG
1 TABLET, SOLUBLE MISCELLANEOUS
Qty: 6 TABLET | Refills: 0 | Status: SHIPPED | OUTPATIENT
Start: 2020-08-05 | End: 2020-09-15

## 2020-08-05 RX ORDER — METHOCARBAMOL 500 MG/1
500 TABLET, FILM COATED ORAL 4 TIMES DAILY
Qty: 40 TABLET | Refills: 0 | Status: SHIPPED | OUTPATIENT
Start: 2020-08-05 | End: 2020-08-15

## 2020-08-05 RX ADMIN — CARBIDOPA AND LEVODOPA 1 TABLET: 25; 100 TABLET, EXTENDED RELEASE ORAL at 12:35

## 2020-08-05 RX ADMIN — STANDARDIZED SENNA CONCENTRATE AND DOCUSATE SODIUM 1 TABLET: 8.6; 5 TABLET ORAL at 09:04

## 2020-08-05 RX ADMIN — CARBIDOPA AND LEVODOPA 2 TABLET: 25; 100 TABLET ORAL at 17:58

## 2020-08-05 RX ADMIN — ACETAMINOPHEN 1000 MG: 500 TABLET ORAL at 14:41

## 2020-08-05 RX ADMIN — CIPROFLOXACIN 500 MG: 500 TABLET ORAL at 21:05

## 2020-08-05 RX ADMIN — AMANTADINE HYDROCHLORIDE 100 MG: 100 CAPSULE ORAL at 09:06

## 2020-08-05 RX ADMIN — METHOCARBAMOL TABLETS 500 MG: 750 TABLET, COATED ORAL at 09:04

## 2020-08-05 RX ADMIN — SODIUM CHLORIDE TAB 1 GM 1 G: 1 TAB at 09:04

## 2020-08-05 RX ADMIN — PRIMIDONE 50 MG: 50 TABLET ORAL at 21:05

## 2020-08-05 RX ADMIN — SENNOSIDES 8.6 MG: 8.6 TABLET, FILM COATED ORAL at 09:08

## 2020-08-05 RX ADMIN — CARBIDOPA AND LEVODOPA 1 TABLET: 25; 100 TABLET, EXTENDED RELEASE ORAL at 17:58

## 2020-08-05 RX ADMIN — METHOCARBAMOL TABLETS 500 MG: 750 TABLET, COATED ORAL at 12:36

## 2020-08-05 RX ADMIN — ATORVASTATIN CALCIUM 20 MG: 20 TABLET, FILM COATED ORAL at 21:05

## 2020-08-05 RX ADMIN — PROPRANOLOL HYDROCHLORIDE 60 MG: 60 CAPSULE, EXTENDED RELEASE ORAL at 21:04

## 2020-08-05 RX ADMIN — DOCUSATE SODIUM 100 MG: 100 CAPSULE, LIQUID FILLED ORAL at 09:04

## 2020-08-05 RX ADMIN — CARBIDOPA AND LEVODOPA 2 TABLET: 25; 100 TABLET ORAL at 14:41

## 2020-08-05 RX ADMIN — SODIUM CHLORIDE TAB 1 GM 1 G: 1 TAB at 16:35

## 2020-08-05 RX ADMIN — FINASTERIDE 5 MG: 5 TABLET, FILM COATED ORAL at 09:04

## 2020-08-05 RX ADMIN — CARBIDOPA AND LEVODOPA 1 TABLET: 25; 100 TABLET, EXTENDED RELEASE ORAL at 14:41

## 2020-08-05 RX ADMIN — CARBIDOPA AND LEVODOPA 1 TABLET: 25; 100 TABLET, EXTENDED RELEASE ORAL at 09:07

## 2020-08-05 RX ADMIN — METHOCARBAMOL TABLETS 500 MG: 750 TABLET, COATED ORAL at 16:35

## 2020-08-05 RX ADMIN — METHOCARBAMOL TABLETS 500 MG: 750 TABLET, COATED ORAL at 21:04

## 2020-08-05 RX ADMIN — ACETAMINOPHEN 1000 MG: 500 TABLET ORAL at 21:10

## 2020-08-05 RX ADMIN — OXYCODONE HYDROCHLORIDE 10 MG: 5 TABLET ORAL at 02:07

## 2020-08-05 RX ADMIN — AMANTADINE HYDROCHLORIDE 100 MG: 100 CAPSULE ORAL at 21:03

## 2020-08-05 RX ADMIN — Medication 10 ML: at 09:05

## 2020-08-05 RX ADMIN — CITALOPRAM 20 MG: 20 TABLET, FILM COATED ORAL at 09:04

## 2020-08-05 RX ADMIN — PRIMIDONE 50 MG: 50 TABLET ORAL at 09:04

## 2020-08-05 RX ADMIN — CARBIDOPA AND LEVODOPA 3 TABLET: 25; 100 TABLET ORAL at 12:35

## 2020-08-05 RX ADMIN — SODIUM CHLORIDE: 9 INJECTION, SOLUTION INTRAVENOUS at 09:13

## 2020-08-05 RX ADMIN — AMLODIPINE BESYLATE 5 MG: 5 TABLET ORAL at 10:08

## 2020-08-05 RX ADMIN — STANDARDIZED SENNA CONCENTRATE AND DOCUSATE SODIUM 1 TABLET: 8.6; 5 TABLET ORAL at 21:04

## 2020-08-05 RX ADMIN — CIPROFLOXACIN 500 MG: 500 TABLET ORAL at 09:04

## 2020-08-05 RX ADMIN — ACETAMINOPHEN 1000 MG: 500 TABLET ORAL at 09:13

## 2020-08-05 RX ADMIN — POLYETHYLENE GLYCOL 3350 17 G: 17 POWDER, FOR SOLUTION ORAL at 09:13

## 2020-08-05 RX ADMIN — DOXAZOSIN 2 MG: 2 TABLET ORAL at 21:04

## 2020-08-05 RX ADMIN — PROPRANOLOL HYDROCHLORIDE 60 MG: 60 CAPSULE, EXTENDED RELEASE ORAL at 09:06

## 2020-08-05 RX ADMIN — ENOXAPARIN SODIUM 40 MG: 40 INJECTION SUBCUTANEOUS at 09:14

## 2020-08-05 RX ADMIN — SODIUM CHLORIDE TAB 1 GM 1 G: 1 TAB at 12:36

## 2020-08-05 RX ADMIN — CARBIDOPA AND LEVODOPA 3 TABLET: 25; 100 TABLET ORAL at 09:05

## 2020-08-05 ASSESSMENT — PAIN SCALES - GENERAL
PAINLEVEL_OUTOF10: 10
PAINLEVEL_OUTOF10: 6
PAINLEVEL_OUTOF10: 4
PAINLEVEL_OUTOF10: 5

## 2020-08-05 NOTE — PROGRESS NOTES
Speech Language Pathology    Speech Language Pathology  [facility]    Cognitive Treatment Note    Date: 8/5/2020  Patients Name: Nolia Boas  MRN: [id number]  Diagnosis:   Patient Active Problem List   Diagnosis Code    Acute ischemic stroke ([facility] I63.9    Subarachnoid hemorrhage (HCC) I60.9    Acute cerebral infarction ([facility] I63.9    Left hemiparesis ([facility] G81.94    Parkinson disease ([facility] [address]    Cerebrovascular accident (CVA) ([facility] I63.9       Pain: Denies    Cognitive Treatment    Treatment time: 0917-0930      Subjective: [] Alert [x] Cooperative     [] Confused     [] Agitated    [x] Lethargic      Objective/Assessment:  Attention: Pt required frequent verbal prompting to attend to the session. Recall: Delayed recall of 3 units from Associated Word List:  8-Minute Delay: 0/3 independently, increased to 2/3 with moderate verbal prompting  13-Minute Delay: 0/3 independently, increased to 1/3 with moderate verbal prompting    Category Members [place]: 5/18 independently, increased to 17/18 with mod-max cuing    Other: Pt partially completed O/M exercises for dysarthria, as he was unable to follow the directions for 3/6 of the exercises. Significant facial weakness noted. RN at bedside during tx session. Plan:  [x] Continue ST services    [] Discharge from ST:      Discharge recommendations: [] Inpatient Rehab   [] [place]   [] Outpatient Therapy  [] Follow up at trauma clinic   [x] Other: Further therapy recommended at discharge. Treatment completed by: Completed by: Elana Najera,  Clinician    Cosigned By: Jerrilyn Moritz. S.CCC/SLP

## 2020-08-05 NOTE — PROGRESS NOTES
Occupational Therapy  Facility/Department: [facility] NEURO  Daily Treatment Note  NAME: Nida Anderson  : 1935  MRN: [id number]    Date of Service: 2020    Discharge Recommendations:  Patient would benefit from continued therapy after discharge . Assessment   Performance deficits / Impairments: Decreased functional mobility ; Decreased safe awareness;Decreased balance;Decreased coordination;Decreased ADL status; Decreased cognition;Decreased posture;Decreased high-level IADLs;Decreased endurance;Decreased ROM; Decreased strength;Decreased fine motor control  Prognosis: Fair  OT Education: OT Role  Patient Education: purpose of activity; ROM  Barriers to Learning: pt demo P carry over  REQUIRES OT FOLLOW UP: Yes  Activity Tolerance  Activity Tolerance: Treatment limited secondary to decreased cognition;Patient limited by fatigue  Safety Devices  Safety Devices in place: Yes  Type of devices: Patient at risk for falls; Left in bed;Call light within reach; Bed alarm in place         Patient Diagnosis(es): The primary encounter diagnosis was Cerebrovascular accident (CVA), unspecified mechanism ([facility] A diagnosis of Traumatic injury of head, initial encounter was also pertinent to this visit. has a past medical history of BPH (benign prostatic hyperplasia), Cancer ([address] Cervical spondylolysis, Depression, Hyperlipidemia, Hypertension, Neck pain, Neuropathy, Orofacial dyskinesia, Osteoarthritis, and Parkinson's disease ([address] has a past surgical history that includes joint replacement; cervical fusion (2020); cervical laminectomy (2020); and cervical fusion (N/A, 2020).     Restrictions  Restrictions/Precautions  Restrictions/Precautions: Fall Risk, General Precautions  Required Braces or Orthoses?: Yes  Required Braces or Orthoses  Cervical: c-collar(worn at all times)  Position Activity Restriction  Other position/activity restrictions: up with assist  Subjective   General  Chart Reviewed:

## 2020-08-05 NOTE — PLAN OF CARE
Nutrition Problem #1: Inadequate oral intake  Intervention: Food and/or Nutrient Delivery: Continue Current Diet, Start Oral Nutrition Supplement  Nutritional Goals: PO intake to meet greater than 50% of estimated nutrient needs.

## 2020-08-05 NOTE — PROGRESS NOTES
cervical collar at all times other than eating and bathing   - Post op cervical xray post surgical fusion  - Plan to be discharged to [address] [place], [place] [address]ab tomorrow 8/6  - on lovenox for DVT ppx  - Patient can follow up with neurosurgery in 2 weeks for post op staple removal    Please contact neurosurgery with any changes in patients neurologic status.        Xena Arechiga CNP  8/5/20  2:36 PM

## 2020-08-05 NOTE — PROGRESS NOTES
Comprehensive Nutrition Assessment    Type and Reason for Visit:  Initial(Length of stay)    Nutrition Recommendations/Plan: Send Ensure Enlive with meals. Encourage PO intake as tolerated. Nutrition Assessment:  Spoke with RN who reports pt needs assistance with meals, but only taking bites. Noted planned d/c tomorrow. Estimated Daily Nutrient Needs:  Energy (kcal):  1400 kcals/day; Weight Used for Energy Requirements:  Current     Protein (g):  1.3-1.5 gm/kg =  gm/day; Weight Used for Protein Requirements:  Current            Wounds:  (Redness on buttocks noted)       Current Nutrition Therapies:    DIET DENTAL SOFT;    Anthropometric Measures:  · Height: 5' 6\" (167.6 cm)  · Current Body Weight: 164 lb 10.9 oz (74.7 kg)   · Admission Body Weight: 162 lb 0.6 oz (73.5 kg)    · Ideal Body Weight: 142 lbs; % Ideal Body Weight 116 %   · BMI: 26.6  · BMI Categories: Overweight (BMI 25.0-29. 9)       Nutrition Diagnosis:   · Inadequate oral intake related to cognitive or neurological impairment(Appetite) as evidenced by intake 0-25%      Nutrition Interventions:   Food and/or Nutrient Delivery:  Continue Current Diet, Start Oral Nutrition Supplement  Nutrition Education/Counseling:  Education not indicated   Coordination of Nutrition Care:  Continued Inpatient Monitoring    Goals:  PO intake to meet greater than 50% of estimated nutrient needs.        Nutrition Monitoring and Evaluation:   Behavioral-Environmental Outcomes:  (N/A)   Food/Nutrient Intake Outcomes:  Food and Nutrient Intake, Supplement Intake  Physical Signs/Symptoms Outcomes:  Weight, Biochemical Data, Nutrition Focused Physical Findings     Discharge Planning:    Continue current diet, Continue Oral Nutrition Supplement     Electronically signed by Katelin Pope MS, RD, LD on 8/5/20 at 3:27 PM EDT    Contact: [phone]

## 2020-08-05 NOTE — PLAN OF CARE
Neuro assessment completed, fall precautions in place, aspirations precautions in place, assess for barriers in communication and mobility, interventions to assist in communication and mobility in place, encouraged to call for assistance, adaptive devices used as needed, assess emotional state and support offered, encouraged patient to communicate by available means, and support systems included in patient care. Pt assessed as a fall risk this shift. Remains free from falls and accidental injury. Fall precautions in place, including falling star sign and fall risk band on pt. Floor free from obstacles, and bed is locked and in lowest position. Adequate lighting provided. Pt encouraged to call before getting out of bed for any need. Bed alarm activated. Will continue to monitor needs during hourly rounding, and reinforce education on use of call light.

## 2020-08-05 NOTE — PROGRESS NOTES
Physical Therapy  Facility/Department: [facility] NEURO  Daily Treatment Note  NAME: Steven Og  : 1935  MRN: [id number]    Date of Service: 2020    Discharge Recommendations:  Patient would benefit from continued therapy after discharge   PT Equipment Recommendations  Other: CTA    Assessment   Body structures, Functions, Activity limitations: Decreased functional mobility ; Decreased endurance;Decreased balance;Decreased strength;Decreased posture;Decreased ADL status; Increased pain  Assessment: Pt required mod/maxAx2 to perform bed mobility, Bret to sit EOB once established. Pt demonstrates significant L side weakness throughout evaluation limiting tolerance to functional mobility. Pt is a high fal risk and would be unsafe to perfom functional mobility without skilled assistance. Pt would benefit from continued therapy after d/c  Prognosis: Fair  PT Education: General Safety; Functional Mobility Training  REQUIRES PT FOLLOW UP: Yes  Activity Tolerance  Activity Tolerance: Patient limited by fatigue;Patient limited by cognitive status     Patient Diagnosis(es): The primary encounter diagnosis was Cerebrovascular accident (CVA), unspecified mechanism ([address] A diagnosis of Traumatic injury of head, initial encounter was also pertinent to this visit. has a past medical history of BPH (benign prostatic hyperplasia), Cancer ([address] Cervical spondylolysis, Depression, Hyperlipidemia, Hypertension, Neck pain, Neuropathy, Orofacial dyskinesia, Osteoarthritis, and Parkinson's disease ([address] has a past surgical history that includes joint replacement; cervical fusion (2020); cervical laminectomy (2020); and cervical fusion (N/A, 2020).     Restrictions  Restrictions/Precautions  Restrictions/Precautions: Fall Risk, General Precautions  Required Braces or Orthoses?: Yes  Required Braces or Orthoses  Cervical: c-collar  Position Activity Restriction  Other position/activity restrictions: up with assist; act as tolerated; up w/assist  Subjective   General  Chart Reviewed: Yes  Response To Previous Treatment: Not applicable  Family / Caregiver Present: No  Subjective  Subjective: RN and pt in agreement for PT; pt alert in bed upon arrival  General Comment  Comments: pt returned to bed with bed alarm activated  Pain Screening  Patient Currently in Pain: No  Vital Signs  Patient Currently in Pain: No       Orientation  Orientation  Overall Orientation Status: Impaired  Cognition      Objective   Bed mobility  Rolling to Right: Maximum assistance;2 Person assistance  Supine to Sit: Moderate assistance;2 Person assistance  Sit to Supine: Maximum assistance;2 Person assistance  Comment: HOB elevated  Transfers  Comment: unable to safely attempt due to pt's poor sitting tolerance  Ambulation  Ambulation?: No     Balance  Posture: Poor  Sitting - Static: Poor;+  Sitting - Dynamic: Poor;-  Comments: Pt sat EOB for ~10 minutes Min A of 1. Pt displayed significant posterior lean with seated LE exercises. Exercises  Sitting LAQ uads with RLE x10  Sitting  AA/PROM LLE all planes x10  LUE AAROM for punches x10/ PROM remainder of exercises x10   Seated HS stretch  15 seconds BLE  Goals  Short term goals  Time Frame for Short term goals: 15  Short term goal 1: Pt to perform bed mobility modA  Short term goal 2: Demonstrate functional transfers modA  Short term goal 3: Pt to demonstrate static sitting balance of fair- to decrease fall risk and maximize independence  Short term goal 4: Tolerate 30 minutes of therapy to address endurance deficits and return pt to prior level of independence  Patient Goals   Patient goals :  To feel better    Plan    Plan  Times per week: 5-6x/week  Current Treatment Recommendations: Strengthening, Transfer Training, Endurance Training, Patient/Caregiver Education & Training, ROM, Balance Training, Gait Training, Home Exercise Program, Functional Mobility Training, Stair training, Safety

## 2020-08-05 NOTE — CARE COORDINATION
Spoke to Ziggy okeefe at [facility]. They are working on the precert. She notifies me that CM should set up transportation for 62:54 on 8/6, if precert is not obtained then CM can push it pack. Transportation set up for 11AM on 8/6 per Toribio at [facility]    Packet has been faxed, and placed in chart slot

## 2020-08-05 NOTE — PLAN OF CARE
Problem: Pain:  Goal: Pain level will decrease  Description: Pain level will decrease  8/5/2020 1116 by Tee Caba RN  Outcome: Ongoing  8/5/2020 0346 by Cynthia Mack RN  Outcome: Ongoing   Will continue to assess pain and medicate per orders.

## 2020-08-05 NOTE — PROGRESS NOTES
Neurology Resident Progress Note      SUBJECTIVE:  This is a 81 y.o.  male admitted 7/29/2020 for [facility] ([facility]ach[facility] hemorrhage) ([facility] [I60.9]  This is a follow-up neurology progress note. The patient was seen and examined and the chart was reviewed. There were no acute events overnight. Patient seen and examined. No acute issues overnight. Afebrile. Labs reviewed. Underwent C1-C2 laminectomy, C4-C5 laminectomy along with fusion and segmental fixation. Hypertensive        ROS  Constitutional: no fever, chills, fatigue  HENT: No change in vision or hearing   Respiratory: No cough, SOB, wheezing. Cardiovascular:  No chest pain, palpitations, leg swelling. Gastrointestinal: No nausea, vomiting, diarrhea. Genitourinary: No increased frequency, urgency. Musculoskeletal: No myalgia or arthralgia. Skin: No rashes or scarring or bruises. Neurological: No headache, paresthesia, or focal weakness. Endo/Heme/Allergies: Negative for itchy eyes or runny nose. Psychiatric/Behavioral: No anxiety or depressed mood.      HPI  See H&P     enoxaparin  40 mg Subcutaneous Daily    amLODIPine  5 mg Oral Daily    methocarbamol  500 mg Oral 4x Daily    ciprofloxacin  500 mg Oral 2 times per day    sodium chloride  1 g Oral TID     IV syringe builder   Intravenous Once    sodium chloride flush  10 mL Intravenous 2 times per day    sennosides-docusate sodium  1 tablet Oral BID    carbidopa-levodopa  1 tablet Oral 4x Daily    carbidopa-levodopa  3 tablet Oral BID    carbidopa-levodopa  2 tablet Oral BID    doxazosin  2 mg Oral Nightly    propranolol  60 mg Oral BID    docusate sodium  100 mg Oral Daily    polyethylene glycol  17 g Oral Daily    finasteride  5 mg Oral Daily    citalopram  20 mg Oral Daily    primidone  50 mg Oral BID    amantadine  100 mg Oral BID    atorvastatin  20 mg Oral Nightly    acetaminophen  1,000 mg Oral 3 times per day    senna  1 tablet Oral Daily       Past Medical History:   Diagnosis Date    BPH (benign prostatic hyperplasia)     Cancer ([facility]     basal cell carcinoma    Cervical spondylolysis     Depression     Hyperlipidemia     Hypertension     Neck pain     Neuropathy     Orofacial dyskinesia     Osteoarthritis     Parkinson's disease ([facility]        Past Surgical History:   Procedure Laterality Date    CERVICAL FUSION  08/02/2020    C4-5    CERVICAL FUSION N/A 8/2/2020    C1-C2 LAMINECTOMY, C4-5 LAMINECTOMY WITH C5 LEFT FORAMINOTOMY, INSTRUMENTED FUSION C4-5 performed by Samaria Rice DO at [address]  08/02/2020    C1-2/C 4-5    JOINT REPLACEMENT         PHYSICAL EXAM:      Blood pressure (!) 185/79, pulse 75, temperature 99.1 °F (37.3 °C), temperature source Oral, resp. rate 16, height 5' 6\" (1.676 m), weight 164 lb 10.9 oz (74.7 kg), SpO2 97 %. General Examination    General Resting comfortably in bed   Head Normocephalic, without obvious abnormality   Neck Supple, symmetrical. Good ROM. No midline or paraspinal tenderness. Lungs Respirations unlabored, no wheezing   Chest Wall No deformity   Heart RRR, no murmur   Abdomen Soft. Non-tender, non-distended   Extremities No cyanosis or edema or warmth. Pulses 2+ and symmetric   Skin: Skin  turgor normal, no rashes or lesions     Mental status  Speech Alert. Oriented to person, place, and time. Speech is fluent without paraphasic errors  Good repetition and naming  Can do 1 step, 2 step, and cross-body commands  Can spell world backwards. Language appropriate. No hallucinations or delusions. No SI/HI. Cranial nerves   II - VFF, visual threat intact  III, IV, VI - extra-ocular muscles full. No nystagmus. Pupils symmetric and responsive.    V - sensation symmetric         VII -mild decreased left NLF  VIII - intact hearing to conversational tone          IX, X - symmetrical palate elevation   XI - 5/5 strength symmetric  XII - tongue midline   Motor function  Strength: (H) 70 - 99 mg/dL    BUN 13 8 - 23 mg/dL    CREATININE 0.42 (L) 0.70 - 1.20 mg/dL    Bun/Cre Ratio NOT REPORTED 9 - 20    Calcium 8.4 (L) 8.6 - 10.4 mg/dL    Sodium 129 (L) 135 - 144 mmol/L    Potassium 4.3 3.7 - 5.3 mmol/L    Chloride 95 (L) 98 - 107 mmol/L    CO2 22 20 - 31 mmol/L    Anion Gap 12 9 - 17 mmol/L    GFR Non-African American >60 >60 mL/min    GFR African American >60 >60 mL/min    GFR Comment          GFR Staging NOT REPORTED    CALCIUM, IONIZED    Collection Time: 08/05/20  9:03 AM   Result Value Ref Range    Calcium, Ion 1.17 1.13 - 1.33 mmol/L       Imaging/Diagnostics:  Xr Hand Right (min 3 Views)    Result Date: 7/30/2020  EXAMINATION: THREE XRAY VIEWS OF THE RIGHT HAND 7/30/2020 11:44 am COMPARISON: None. HISTORY: ORDERING SYSTEM PROVIDED HISTORY: hand pain TECHNOLOGIST PROVIDED HISTORY: hand pain Reason for Exam: hand pain Acuity: Unknown FINDINGS: Paralyzed osseous structure triscaphe and thumb CMC degenerative change. Degenerative changes of the 1st MCP and IP joint. Degenerative changes of the 3rd PIP joint. hook osteophytes of the 2nd 3rd metacarpals. Degenerative changes of the hand. Xr Foot Left (min 3 Views)    Result Date: 7/30/2020  EXAMINATION: THREE XRAY VIEWS OF THE LEFT FOOT 7/30/2020 11:44 am COMPARISON: None HISTORY: ORDERING SYSTEM PROVIDED HISTORY: foot pain TECHNOLOGIST PROVIDED HISTORY: foot pain Reason for Exam: foot pain Acuity: Unknown Acute left foot pain. Initial encounter. FINDINGS: Moderate 1st MTP degenerative changes. Moderate midfoot degenerative changes. Lisfranc alignment is maintained. Hammertoe deformity of the 2nd digit. 1. No acute radiographic finding to account for patient's left foot pain. 2. Moderate 1st MTP degenerative changes. 3. Moderate midfoot degenerative changes. 4. Hammertoe deformity of the 2nd digit.      Ct Head Wo Contrast    Result Date: 7/30/2020  EXAMINATION: CT OF THE HEAD WITHOUT CONTRAST,  7/29/2020 1:40 pm TECHNIQUE: CT of the cervical spine was performed without the administration of intravenous contrast. Multiplanar reformatted images are provided for review. Dose modulation, iterative reconstruction, and/or weight based adjustment of the mA/kV was utilized to reduce the radiation dose to as low as reasonably achievable. COMPARISON: None HISTORY: ORDERING SYSTEM PROVIDED HISTORY: trauma TECHNOLOGIST PROVIDED HISTORY: trauma Reason for Exam: fall Acuity: Acute Type of Exam: Initial FINDINGS: No acute fracture. No subluxation. No prevertebral soft tissue swelling. Severe multilevel degenerative changes. Abnormal C1-C2 positioning with widening on the left. Abnormal C1-C2 relationship which may be related to positioning. If there is concern for ligamentous injury, MRI is recommended. No acute fracture. Severe multilevel degenerative changes. Ct Thoracic Spine Wo Contrast    Result Date: 7/29/2020  EXAMINATION: CT OF THE THORACIC SPINE WITHOUT CONTRAST; CT OF THE LUMBAR SPINE WITHOUT CONTRAST 7/29/2020 TECHNIQUE: CT of the thoracic spine was performed without the administration of intravenous contrast. Multiplanar reformatted images are provided for review. Dose modulation, iterative reconstruction, and/or weight based adjustment of the mA/kV was utilized to reduce the radiation dose to as low as reasonably achievable.; CT of the lumbar spine was performed without the administration of intravenous contrast. Multiplanar reformatted images are provided for review. Dose modulation, iterative reconstruction, and/or weight based adjustment of the mA/kV was utilized to reduce the radiation dose to as low as reasonably achievable.  COMPARISON: CT chest abdomen pelvis 07/29/2020 HISTORY: ORDERING SYSTEM PROVIDED HISTORY: trauma TECHNOLOGIST PROVIDED HISTORY: trauma Reason for Exam: fall Acuity: Acute Type of Exam: Initial; ORDERING SYSTEM PROVIDED HISTORY: TRAUMA TECHNOLOGIST PROVIDED HISTORY: trauma Reason for Exam: fall Acuity: Acute Type of Exam: Initial FINDINGS: BONES/ALIGNMENT: There is no acute fracture or traumatic malalignment. Central plate depression N7-X0 appears chronic. DEGENERATIVE CHANGES: Severe multilevel degenerate changes. Multilevel ankylosis identified. Slight widening of the disc spaces identified at T5-T6 and T12-L1 likely degenerative severe multilevel disc space disease involving the lumbar spine with multilevel vacuum disc phenomena. SOFT TISSUES: No paraspinal mass is seen. Moderate severe multilevel degenerate changes of the thoracic spine and severe multilevel degenerate changes of the lumbar spine. No convincing evidence acute fracture traumatic malalignment     Ct Lumbar Spine Wo Contrast    Result Date: 7/29/2020  EXAMINATION: CT OF THE THORACIC SPINE WITHOUT CONTRAST; CT OF THE LUMBAR SPINE WITHOUT CONTRAST 7/29/2020 TECHNIQUE: CT of the thoracic spine was performed without the administration of intravenous contrast. Multiplanar reformatted images are provided for review. Dose modulation, iterative reconstruction, and/or weight based adjustment of the mA/kV was utilized to reduce the radiation dose to as low as reasonably achievable.; CT of the lumbar spine was performed without the administration of intravenous contrast. Multiplanar reformatted images are provided for review. Dose modulation, iterative reconstruction, and/or weight based adjustment of the mA/kV was utilized to reduce the radiation dose to as low as reasonably achievable. COMPARISON: CT chest abdomen pelvis 07/29/2020 HISTORY: ORDERING SYSTEM PROVIDED HISTORY: trauma TECHNOLOGIST PROVIDED HISTORY: trauma Reason for Exam: fall Acuity: Acute Type of Exam: Initial; ORDERING SYSTEM PROVIDED HISTORY: TRAUMA TECHNOLOGIST PROVIDED HISTORY: trauma Reason for Exam: fall Acuity: Acute Type of Exam: Initial FINDINGS: BONES/ALIGNMENT: There is no acute fracture or traumatic malalignment. Central plate depression C8-G0 appears chronic.  DEGENERATIVE CHANGES: Severe multilevel degenerate changes. Multilevel ankylosis identified. Slight widening of the disc spaces identified at T5-T6 and T12-L1 likely degenerative severe multilevel disc space disease involving the lumbar spine with multilevel vacuum disc phenomena. SOFT TISSUES: No paraspinal mass is seen. Moderate severe multilevel degenerate changes of the thoracic spine and severe multilevel degenerate changes of the lumbar spine. No convincing evidence acute fracture traumatic malalignment     Mri Cervical Spine Wo Contrast    Result Date: 7/30/2020  EXAMINATION: MRI OF THE CERVICAL SPINE WITHOUT CONTRAST 7/30/2020 6:12 pm TECHNIQUE: Multiplanar multisequence MRI of the cervical spine was performed without the administration of intravenous contrast. COMPARISON: CT cervical spine July 29, 2020 HISTORY: ORDERING SYSTEM PROVIDED HISTORY: possible unstable spine TECHNOLOGIST PROVIDED HISTORY: possible unstable spine Reason for Exam: possible unstable spine FINDINGS: BONES/ALIGNMENT: There is straightening of normal cervical lordosis. There is partial osseous fusion at the posterior aspects of C2-3 and C3-4. There are mild chronic compression deformities at the superior endplates of T1 and T2. The cervical vertebral body heights are grossly maintained, without acute fracture or destructive osseous lesion. There is 2 mm C4 on C5 anterolisthesis, 1-2 mm C6 on C7 anterolisthesis. There is degenerative disc disease with disc desiccation, disc space narrowing and endplate changes. 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 SPINAL CORD: There is increased T2 signal in the cervical spinal cord at C1-2 level, likely related to spinal cord edema. 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 SOFT TISSUES: There is increased T2 signal in the asymmetrically widened right lateral atlanto-dens interval, nonspecific. Injury of the alar ligaments cannot be excluded.  C1-2: There is moderate narrowing of the spinal canal with mild spinal cord compression. C2-C3: There is no significant disc herniation. There is mild spinal canal narrowing, moderate left foraminal narrowing. There is mild bilateral facet arthrosis with fusion of the bilateral facet joints. C3-C4: There is no significant disc herniation. There is mild spinal canal narrowing, moderate bilateral foraminal narrowing. There is moderate left facet arthrosis with fusion of the facet joint. C4-C5: There is anterolisthesis, disc bulge, severe left and mild right facet arthrosis, with mild-to-moderate spinal canal narrowing, mild spinal cord compression, moderate to severe bilateral foraminal narrowing. C5-C6: There is disc bulge, severe left and mild right facet arthrosis, with minimal spinal canal narrowing, moderate to severe bilateral foraminal narrowing. C6-C7: There is disc bulge, moderate left and mild right facet arthrosis, with mild spinal canal narrowing, and moderate bilateral foraminal narrowing. C7-T1: There is no significant disc protrusion, spinal canal stenosis or neural foraminal narrowing. Increased T2 signal in the cervical spinal cord at C1-2 level, likely related to spinal cord edema. Increased T2 signal in the asymmetrically widened right lateral atlanto-dens interval, nonspecific. Injury of the alar ligaments cannot be excluded. Degenerative disc disease as described above, exacerbating congenitally narrow cervical spinal canal. Spinal canal narrowing, moderate at C1-2 with mild spinal cord compression, mild to moderate at C4-5 with mild spinal cord compression, mild at C2-3, C3-4 and C6-7, minimal at C5-6. Foraminal narrowing, moderate to severe at bilateral C4-5 and bilateral C5-6, moderate at left C2-3, bilateral C3-4 and bilateral C6-7. 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      Xr Shoulder Left (min 2 Views)    Result Date: 7/30/2020  EXAMINATION: TWO XRAY VIEWS OF THE LEFT SHOULDER 7/30/2020 11:44 am COMPARISON: None HISTORY: ORDERING SYSTEM PROVIDED HISTORY: shoulder pain TECHNOLOGIST PROVIDED HISTORY: shoulder pain Reason for Exam: shoulder pain Acuity: Unknown Acute left shoulder pain. FINDINGS: The humeral head aligns normally with the glenoid fossae. Coracoclavicular and acromioclavicular interval are within normal range. No visible fracture. No dislocation. There is a calcified granuloma in the left upper lobe. 1. No acute radiographic finding to account for patient's left shoulder pain. Xr Chest Portable    Result Date: 7/29/2020  EXAMINATION: ONE XRAY VIEW OF THE CHEST 7/29/2020 3:56 pm COMPARISON: None. HISTORY: ORDERING SYSTEM PROVIDED HISTORY: pCO2 high, no SOB TECHNOLOGIST PROVIDED HISTORY: pCO2 high, no SOB FINDINGS: No focal consolidation. Mild cardiomegaly. No pulmonary edema. Calcified granuloma. No acute findings. Cta Head Neck W Contrast    Result Date: 7/30/2020  EXAMINATION: CTA OF THE HEAD AND NECK WITH CONTRAST 7/29/2020 1:40 pm: TECHNIQUE: CTA of the head and neck was performed with the administration of intravenous contrast. Multiplanar reformatted images are provided for review. MIP images are provided for review. Stenosis of the internal carotid arteries measured using NASCET criteria. Dose modulation, iterative reconstruction, and/or weight based adjustment of the mA/kV was utilized to reduce the radiation dose to as low as reasonably achievable. COMPARISON: None. HISTORY: ORDERING SYSTEM PROVIDED HISTORY: stroke symptoms, L sided weakness, LKW 1210pm TECHNOLOGIST PROVIDED HISTORY: stroke symptoms, L sided weakness, LKW 1210pm Reason for Exam: stroke, L sided weakness. LKW 1210pm Acuity: Unknown Type of Exam: Unknown Initial evaluation. FINDINGS: CTA NECK: AORTIC ARCH/ARCH VESSELS: No significant abnormality of the aortic arch is identified.   Incidental note is made of the left approximately 20% stenosis in the region of the bulb. The left vertebral artery originates directly from the aortic arch which is a normal anatomic variant. No significant abnormality of the intracranial circulation. Incidental note is made of fetal origin of the right posterior cerebral artery which is a normal anatomic variant. Ct Chest Abdomen Pelvis W Contrast    Result Date: 7/30/2020  EXAMINATION: CT OF THE CHEST, ABDOMEN, AND PELVIS WITH CONTRAST 7/29/2020 7:05 pm TECHNIQUE: CT of the chest, abdomen and pelvis was performed with the administration of intravenous contrast. Multiplanar reformatted images are provided for review. Dose modulation, iterative reconstruction, and/or weight based adjustment of the mA/kV was utilized to reduce the radiation dose to as low as reasonably achievable. COMPARISON: None. HISTORY: ORDERING SYSTEM PROVIDED HISTORY: trauma TECHNOLOGIST PROVIDED HISTORY: trauma Reason for Exam: fall Acuity: Acute Type of Exam: Initial Patient fell hitting head when opening front door. FINDINGS: Chest: Mediastinum: No mediastinal adenopathy or acute aortic abnormality. Calcification aortic arch is noted. Mild cardiomegaly. Coronary artery calcification. No pericardial effusion. Lungs/pleura: Mild emphysematous changes. No effusion, focal consolidation, or extrapleural air. Tracheobronchial tree is patent. Left upper lobe granuloma. Soft Tissues/Bones: Non-acute appearing fracture C7 spinous process versus soft tissue ligamentous calcification. No acute appearing osseous abnormality. No axillary adenopathy or acute soft tissue abnormality. Abdomen/Pelvis: Organs: Liver, gallbladder, pancreas, spleen, and adrenals are unremarkable. Kidneys contain multiple bilateral cysts largest in the right kidney of 5 cm and in the left kidney of 3.5 cm. No urinary obstruction. GI/Bowel: No free fluid, free air, bowel obstruction or bowel wall thickening. Increased colonic stool load.  Pelvis: lobe, cerebral atrophy  -No TPA due to traumatic subarachnoid hemorrhage  -Trauma surgery and neurosurgery consulted: No neurosurgery intervention needed,  -MRI cervical spine W 0 contrast: Increased T2 signal in the cervical spine cord at C1-2 level likely 2 spinal cord edema, increased 3 to spinal in the soft symmetry widened right lateral atlanto dens, degenerative disc disease, spinal canal narrowing, moderate at C1-2 with mild spinal cord compression-underwent laminectomy and fusion  -TTE with bubble study: Negative   -Folic acid 1 mg twice daily  -Aspirin 81 mg  -continue atorvastatin 20 mg nightly  -Started on [facility] for DVT prophylaxis   -Cervical collar- as per trauma/nerosurgery   -PT/OT/speech  -Urine growing staph aureus.  MSSA, started on Cipro for 5 days.   -Propranolol for tremors  -started on Norvasc 5 mg for hypertension   -Gentle hydration/ follow up on         DC to rehab     Jono Hernandez MD  PGY-2, Internal medicine resident/Neurologu service   [facility] - [place], [place]

## 2020-08-06 VITALS
RESPIRATION RATE: 17 BRPM | HEART RATE: 69 BPM | TEMPERATURE: 98.8 F | OXYGEN SATURATION: 99 % | DIASTOLIC BLOOD PRESSURE: 69 MMHG | SYSTOLIC BLOOD PRESSURE: 161 MMHG | HEIGHT: 66 IN | BODY MASS INDEX: 26.47 KG/M2 | WEIGHT: 164.68 LBS

## 2020-08-06 LAB
ABSOLUTE EOS #: 0.05 K/UL (ref 0–0.44)
ABSOLUTE IMMATURE GRANULOCYTE: 0.03 K/UL (ref 0–0.3)
ABSOLUTE LYMPH #: 0.71 K/UL (ref 1.1–3.7)
ABSOLUTE MONO #: 0.98 K/UL (ref 0.1–1.2)
ANION GAP SERPL CALCULATED.3IONS-SCNC: 11 MMOL/L (ref 9–17)
BASOPHILS # BLD: 0 % (ref 0–2)
BASOPHILS ABSOLUTE: <0.03 K/UL (ref 0–0.2)
BUN BLDV-MCNC: 12 MG/DL (ref 8–23)
BUN/CREAT BLD: ABNORMAL (ref 9–20)
CALCIUM SERPL-MCNC: 8.4 MG/DL (ref 8.6–10.4)
CHLORIDE BLD-SCNC: 97 MMOL/L (ref 98–107)
CO2: 24 MMOL/L (ref 20–31)
CREAT SERPL-MCNC: 0.41 MG/DL (ref 0.7–1.2)
DIFFERENTIAL TYPE: ABNORMAL
EOSINOPHILS RELATIVE PERCENT: 1 % (ref 1–4)
GFR AFRICAN AMERICAN: >60 ML/MIN
GFR NON-AFRICAN AMERICAN: >60 ML/MIN
GFR SERPL CREATININE-BSD FRML MDRD: ABNORMAL ML/MIN/{1.73_M2}
GFR SERPL CREATININE-BSD FRML MDRD: ABNORMAL ML/MIN/{1.73_M2}
GLUCOSE BLD-MCNC: 107 MG/DL (ref 70–99)
HCT VFR BLD CALC: 31.1 % (ref 40.7–50.3)
HEMOGLOBIN: 10.4 G/DL (ref 13–17)
IMMATURE GRANULOCYTES: 1 %
LYMPHOCYTES # BLD: 12 % (ref 24–43)
MCH RBC QN AUTO: 32 PG (ref 25.2–33.5)
MCHC RBC AUTO-ENTMCNC: 33.4 G/DL (ref 28.4–34.8)
MCV RBC AUTO: 95.7 FL (ref 82.6–102.9)
MONOCYTES # BLD: 16 % (ref 3–12)
NRBC AUTOMATED: 0 PER 100 WBC
PDW BLD-RTO: 13.4 % (ref 11.8–14.4)
PLATELET # BLD: 164 K/UL (ref 138–453)
PLATELET ESTIMATE: ABNORMAL
PMV BLD AUTO: 11.5 FL (ref 8.1–13.5)
POTASSIUM SERPL-SCNC: 4.3 MMOL/L (ref 3.7–5.3)
RBC # BLD: 3.25 M/UL (ref 4.21–5.77)
RBC # BLD: ABNORMAL 10*6/UL
SEG NEUTROPHILS: 70 % (ref 36–65)
SEGMENTED NEUTROPHILS ABSOLUTE COUNT: 4.19 K/UL (ref 1.5–8.1)
SODIUM BLD-SCNC: 132 MMOL/L (ref 135–144)
WBC # BLD: 6 K/UL (ref 3.5–11.3)
WBC # BLD: ABNORMAL 10*3/UL

## 2020-08-06 PROCEDURE — 80048 BASIC METABOLIC PNL TOTAL CA: CPT

## 2020-08-06 PROCEDURE — 2580000003 HC RX 258: Performed by: PHYSICIAN ASSISTANT

## 2020-08-06 PROCEDURE — 36415 COLL VENOUS BLD VENIPUNCTURE: CPT

## 2020-08-06 PROCEDURE — 92507 TX SP LANG VOICE COMM INDIV: CPT

## 2020-08-06 PROCEDURE — 6370000000 HC RX 637 (ALT 250 FOR IP): Performed by: PHYSICIAN ASSISTANT

## 2020-08-06 PROCEDURE — 6370000000 HC RX 637 (ALT 250 FOR IP): Performed by: INTERNAL MEDICINE

## 2020-08-06 PROCEDURE — 6360000002 HC RX W HCPCS: Performed by: STUDENT IN AN ORGANIZED HEALTH CARE EDUCATION/TRAINING PROGRAM

## 2020-08-06 PROCEDURE — 2580000003 HC RX 258: Performed by: INTERNAL MEDICINE

## 2020-08-06 PROCEDURE — 99238 HOSP IP/OBS DSCHRG MGMT 30/<: CPT | Performed by: PSYCHIATRY & NEUROLOGY

## 2020-08-06 PROCEDURE — 6370000000 HC RX 637 (ALT 250 FOR IP): Performed by: STUDENT IN AN ORGANIZED HEALTH CARE EDUCATION/TRAINING PROGRAM

## 2020-08-06 PROCEDURE — 85025 COMPLETE CBC W/AUTO DIFF WBC: CPT

## 2020-08-06 RX ORDER — AMLODIPINE BESYLATE 10 MG/1
10 TABLET ORAL DAILY
Status: DISCONTINUED | OUTPATIENT
Start: 2020-08-06 | End: 2020-08-06 | Stop reason: HOSPADM

## 2020-08-06 RX ADMIN — CARBIDOPA AND LEVODOPA 1 TABLET: 25; 100 TABLET, EXTENDED RELEASE ORAL at 10:56

## 2020-08-06 RX ADMIN — SODIUM CHLORIDE TAB 1 GM 1 G: 1 TAB at 08:19

## 2020-08-06 RX ADMIN — ENOXAPARIN SODIUM 40 MG: 40 INJECTION SUBCUTANEOUS at 09:00

## 2020-08-06 RX ADMIN — CITALOPRAM 20 MG: 20 TABLET, FILM COATED ORAL at 10:56

## 2020-08-06 RX ADMIN — METHOCARBAMOL TABLETS 500 MG: 750 TABLET, COATED ORAL at 09:00

## 2020-08-06 RX ADMIN — CARBIDOPA AND LEVODOPA 1 TABLET: 25; 100 TABLET, EXTENDED RELEASE ORAL at 08:19

## 2020-08-06 RX ADMIN — SENNOSIDES 8.6 MG: 8.6 TABLET, FILM COATED ORAL at 09:01

## 2020-08-06 RX ADMIN — Medication 10 ML: at 09:00

## 2020-08-06 RX ADMIN — PROPRANOLOL HYDROCHLORIDE 60 MG: 60 CAPSULE, EXTENDED RELEASE ORAL at 09:00

## 2020-08-06 RX ADMIN — ACETAMINOPHEN 1000 MG: 500 TABLET ORAL at 06:11

## 2020-08-06 RX ADMIN — PRIMIDONE 50 MG: 50 TABLET ORAL at 09:00

## 2020-08-06 RX ADMIN — CIPROFLOXACIN 500 MG: 500 TABLET ORAL at 09:00

## 2020-08-06 RX ADMIN — STANDARDIZED SENNA CONCENTRATE AND DOCUSATE SODIUM 1 TABLET: 8.6; 5 TABLET ORAL at 09:00

## 2020-08-06 RX ADMIN — AMLODIPINE BESYLATE 10 MG: 10 TABLET ORAL at 10:56

## 2020-08-06 RX ADMIN — CARBIDOPA AND LEVODOPA 3 TABLET: 25; 100 TABLET ORAL at 08:18

## 2020-08-06 RX ADMIN — FINASTERIDE 5 MG: 5 TABLET, FILM COATED ORAL at 09:00

## 2020-08-06 RX ADMIN — DOCUSATE SODIUM 100 MG: 100 CAPSULE, LIQUID FILLED ORAL at 09:02

## 2020-08-06 RX ADMIN — SODIUM CHLORIDE: 9 INJECTION, SOLUTION INTRAVENOUS at 04:58

## 2020-08-06 RX ADMIN — CARBIDOPA AND LEVODOPA 3 TABLET: 25; 100 TABLET ORAL at 10:56

## 2020-08-06 RX ADMIN — POLYETHYLENE GLYCOL 3350 17 G: 17 POWDER, FOR SOLUTION ORAL at 09:02

## 2020-08-06 RX ADMIN — AMANTADINE HYDROCHLORIDE 100 MG: 100 CAPSULE ORAL at 09:00

## 2020-08-06 ASSESSMENT — PAIN SCALES - GENERAL
PAINLEVEL_OUTOF10: 6
PAINLEVEL_OUTOF10: 0
PAINLEVEL_OUTOF10: 0

## 2020-08-06 NOTE — CARE COORDINATION
Call into [address] [place] [address] Rehab to verify precert complete, message left for Tiffanie also, spoke with Dallas Barragan in admissions and precert is complete and patient is approved to go.      Patients nurse notified patient will be leaving at [address] [facility] [address] for [address] [place] [address] rehab, attempt to call patients dwain Velázquez, message left on answering machine regarding her fathers transfer at 11am. Call to patients son Ronna Landeros, unable to leave message

## 2020-08-06 NOTE — PROGRESS NOTES
Speech Language Pathology    Speech Language Pathology  [facility]    Cognitive Treatment Note    Date: 8/6/2020  Patients Name: Nolia Boas  MRN: [id number]  Diagnosis:   Patient Active Problem List   Diagnosis Code    Acute ischemic stroke (HCC) I63.9    Subarachnoid hemorrhage (HCC) I60.9    Acute cerebral infarction ([facility] I63.9    Left hemiparesis ([facility] G81.94    Parkinson disease ([facility] [address]    Cerebrovascular accident (CVA) ([facility] I63.9       Pain: Denies    Cognitive Treatment    Treatment time: 1821-0417      Subjective: [] Alert [x] Cooperative     [] Confused     [] Agitated    [x] Lethargic      Objective/Assessment:  Attention: Pt required frequent verbal prompting to attend to the activities. Recall: Pt will recall 3 units given pictures  5-Minute Delay: 1/3 independently increased to 3/3 with moderate verbal cuing    Multiple Uses for Objects: 3/3 independently    Categories: 1/7 independently, increased to 5/7 with maximal verbal prompting    Speech: Pt completed 3 reps of 3/7 exercises with maximal verbal prompting    Other: Pt lethargic during session and required frequent verbal prompting. Plan:  [x] Continue  services    [] Discharge from :      Discharge recommendations: [] Inpatient Rehab   [] [place]   [] Outpatient Therapy  [] Follow up at trauma clinic   [x] Other:  Further therapy recommended at discharge. Treatment completed by: Completed by: Elana Najera,  Clinician    Cosigned By: Ana SCHWARTZ CCC/SLP

## 2020-08-06 NOTE — PROGRESS NOTES
Patient left for snf with transport. No c/o pain or distress at this time. Patient belongings went with  and facility called with report. Daughter phoned and she was informed of his transport.

## 2020-08-06 NOTE — PROGRESS NOTES
Diagnosis Date    BPH (benign prostatic hyperplasia)     Cancer (HCC)     basal cell carcinoma    Cervical spondylolysis     Depression     Hyperlipidemia     Hypertension     Neck pain     Neuropathy     Orofacial dyskinesia     Osteoarthritis     Parkinson's disease ([facility] [address]        Past Surgical History:   Procedure Laterality Date    CERVICAL FUSION  08/02/2020    C4-5    CERVICAL FUSION N/A 8/2/2020    C1-C2 LAMINECTOMY, C4-5 LAMINECTOMY WITH C5 LEFT FORAMINOTOMY, INSTRUMENTED FUSION C4-5 performed by Serina Vela, DO at [address]  08/02/2020    C1-2/C 4-5    JOINT REPLACEMENT         PHYSICAL EXAM:      Blood pressure (!) 161/69, pulse 69, temperature 98.8 °F (37.1 °C), temperature source Oral, resp. rate 17, height 5' 6\" (1.676 m), weight 164 lb 10.9 oz (74.7 kg), SpO2 99 %. General Examination    General Resting comfortably in bed   Head Normocephalic, without obvious abnormality   Neck Supple, symmetrical. Good ROM. No midline or paraspinal tenderness. Lungs Respirations unlabored, no wheezing   Chest Wall No deformity   Heart RRR, no murmur   Abdomen Soft. Non-tender, non-distended   Extremities No cyanosis or edema or warmth. Pulses 2+ and symmetric   Skin: Skin  turgor normal, no rashes or lesions     Mental status  Speech Alert. Oriented to person, place, and time. Speech is fluent without paraphasic errors  Good repetition and naming  Can do 1 step, 2 step, and cross-body commands  Can spell world backwards. Language appropriate. No hallucinations or delusions. No SI/HI. Cranial nerves   II - VFF, visual threat intact  III, IV, VI - extra-ocular muscles full. No nystagmus. Pupils symmetric and responsive.    V - sensation symmetric         VII -mild decreased left NLF  VIII - intact hearing to conversational tone          IX, X - symmetrical palate elevation   XI - 5/5 strength symmetric  XII - tongue midline   Motor function  Strength: Left arm and leg MG    Collection Time: 08/06/20  5:00 AM   Result Value Ref Range    Glucose 107 (H) 70 - 99 mg/dL    BUN 12 8 - 23 mg/dL    CREATININE 0.41 (L) 0.70 - 1.20 mg/dL    Bun/Cre Ratio NOT REPORTED 9 - 20    Calcium 8.4 (L) 8.6 - 10.4 mg/dL    Sodium 132 (L) 135 - 144 mmol/L    Potassium 4.3 3.7 - 5.3 mmol/L    Chloride 97 (L) 98 - 107 mmol/L    CO2 24 20 - 31 mmol/L    Anion Gap 11 9 - 17 mmol/L    GFR Non-African American >60 >60 mL/min    GFR African American >60 >60 mL/min    GFR Comment          GFR Staging NOT REPORTED        Imaging/Diagnostics:  Xr Hand Right (min 3 Views)    Result Date: 7/30/2020  EXAMINATION: THREE XRAY VIEWS OF THE RIGHT HAND 7/30/2020 11:44 am COMPARISON: None. HISTORY: ORDERING SYSTEM PROVIDED HISTORY: hand pain TECHNOLOGIST PROVIDED HISTORY: hand pain Reason for Exam: hand pain Acuity: Unknown FINDINGS: Paralyzed osseous structure triscaphe and thumb CMC degenerative change. Degenerative changes of the 1st MCP and IP joint. Degenerative changes of the 3rd PIP joint. hook osteophytes of the 2nd 3rd metacarpals. Degenerative changes of the hand. Xr Foot Left (min 3 Views)    Result Date: 7/30/2020  EXAMINATION: THREE XRAY VIEWS OF THE LEFT FOOT 7/30/2020 11:44 am COMPARISON: None HISTORY: ORDERING SYSTEM PROVIDED HISTORY: foot pain TECHNOLOGIST PROVIDED HISTORY: foot pain Reason for Exam: foot pain Acuity: Unknown Acute left foot pain. Initial encounter. FINDINGS: Moderate 1st MTP degenerative changes. Moderate midfoot degenerative changes. Lisfranc alignment is maintained. Hammertoe deformity of the 2nd digit. 1. No acute radiographic finding to account for patient's left foot pain. 2. Moderate 1st MTP degenerative changes. 3. Moderate midfoot degenerative changes. 4. Hammertoe deformity of the 2nd digit.      Ct Head Wo Contrast    Result Date: 7/30/2020  EXAMINATION: CT OF THE HEAD WITHOUT CONTRAST,  7/29/2020 1:40 pm TECHNIQUE: CT of the head was performed without the administration of intravenous contrast. Dose modulation, iterative reconstruction, and/or weight based adjustment of the mA/kV was utilized to reduce the radiation dose to as low as reasonably achievable. COMPARISON: None HISTORY: ORDERING SYSTEM PROVIDED HISTORY: Stroke, L sided weakness. LKW 1210 pm TECHNOLOGIST PROVIDED HISTORY: Stroke, L sided weakness. LKW 1210 pm Reason for Exam: Stroke, L sided weakness. LKW 1210 pm Acuity: Unknown Type of Exam: Unknown Initial evaluation FINDINGS: BRAIN/VENTRICLES: There is mild cerebral atrophy. There is atherosclerotic calcification of the cavernous carotid arteries. There are areas of hypoattenuation in the periventricular white matter and centrum semiovale that are likely related to chronic small vessel ischemic disease. There is a focal area of hypoattenuation in the left frontal lobe that likely represents an infarct of indeterminate age. There is no midline shift or mass effect. There is a subtle questionable area of hyperdensity in one of the sulci over the high right parietal region. While this may be artifact, very subtle subarachnoid hemorrhage cannot be excluded. ORBITS: The visualized portion of the orbits demonstrate no acute abnormality. SINUSES:  The visualized paranasal sinuses and mastoid air cells are clear. SOFT TISSUES/SKULL:  No acute abnormality of the visualized skull or soft tissues. Cerebral atrophy. Chronic small vessel ischemic changes. Area of hypoattenuation in the left frontal periventricular white matter which likely represents an infarct of indeterminate age. Questionable subtle hyperattenuation over the right frontal convexity which is suspicious for a tiny subarachnoid hemorrhage. Report was called and discussed with Dr. Morgan Pedroza, 07/29/2020 at 2:16 p.m.      Ct Cervical Spine Wo Contrast    Result Date: 7/29/2020  EXAMINATION: CT OF THE CERVICAL SPINE WITHOUT CONTRAST 7/29/2020 7:05 pm TECHNIQUE: CT of the cervical spine was performed without the administration of intravenous contrast. Multiplanar reformatted images are provided for review. Dose modulation, iterative reconstruction, and/or weight based adjustment of the mA/kV was utilized to reduce the radiation dose to as low as reasonably achievable. COMPARISON: None HISTORY: ORDERING SYSTEM PROVIDED HISTORY: trauma TECHNOLOGIST PROVIDED HISTORY: trauma Reason for Exam: fall Acuity: Acute Type of Exam: Initial FINDINGS: No acute fracture. No subluxation. No prevertebral soft tissue swelling. Severe multilevel degenerative changes. Abnormal C1-C2 positioning with widening on the left. Abnormal C1-C2 relationship which may be related to positioning. If there is concern for ligamentous injury, MRI is recommended. No acute fracture. Severe multilevel degenerative changes. Ct Thoracic Spine Wo Contrast    Result Date: 7/29/2020  EXAMINATION: CT OF THE THORACIC SPINE WITHOUT CONTRAST; CT OF THE LUMBAR SPINE WITHOUT CONTRAST 7/29/2020 TECHNIQUE: CT of the thoracic spine was performed without the administration of intravenous contrast. Multiplanar reformatted images are provided for review. Dose modulation, iterative reconstruction, and/or weight based adjustment of the mA/kV was utilized to reduce the radiation dose to as low as reasonably achievable.; CT of the lumbar spine was performed without the administration of intravenous contrast. Multiplanar reformatted images are provided for review. Dose modulation, iterative reconstruction, and/or weight based adjustment of the mA/kV was utilized to reduce the radiation dose to as low as reasonably achievable.  COMPARISON: CT chest abdomen pelvis 07/29/2020 HISTORY: ORDERING SYSTEM PROVIDED HISTORY: trauma TECHNOLOGIST PROVIDED HISTORY: trauma Reason for Exam: fall Acuity: Acute Type of Exam: Initial; ORDERING SYSTEM PROVIDED HISTORY: TRAUMA TECHNOLOGIST PROVIDED HISTORY: trauma Reason for Exam: fall Acuity: Acute Type of Exam: Initial FINDINGS: BONES/ALIGNMENT: There is no acute fracture or traumatic malalignment. Central plate depression P3-C5 appears chronic. DEGENERATIVE CHANGES: Severe multilevel degenerate changes. Multilevel ankylosis identified. Slight widening of the disc spaces identified at T5-T6 and T12-L1 likely degenerative severe multilevel disc space disease involving the lumbar spine with multilevel vacuum disc phenomena. SOFT TISSUES: No paraspinal mass is seen. Moderate severe multilevel degenerate changes of the thoracic spine and severe multilevel degenerate changes of the lumbar spine. No convincing evidence acute fracture traumatic malalignment     Ct Lumbar Spine Wo Contrast    Result Date: 7/29/2020  EXAMINATION: CT OF THE THORACIC SPINE WITHOUT CONTRAST; CT OF THE LUMBAR SPINE WITHOUT CONTRAST 7/29/2020 TECHNIQUE: CT of the thoracic spine was performed without the administration of intravenous contrast. Multiplanar reformatted images are provided for review. Dose modulation, iterative reconstruction, and/or weight based adjustment of the mA/kV was utilized to reduce the radiation dose to as low as reasonably achievable.; CT of the lumbar spine was performed without the administration of intravenous contrast. Multiplanar reformatted images are provided for review. Dose modulation, iterative reconstruction, and/or weight based adjustment of the mA/kV was utilized to reduce the radiation dose to as low as reasonably achievable. COMPARISON: CT chest abdomen pelvis 07/29/2020 HISTORY: ORDERING SYSTEM PROVIDED HISTORY: trauma TECHNOLOGIST PROVIDED HISTORY: trauma Reason for Exam: fall Acuity: Acute Type of Exam: Initial; ORDERING SYSTEM PROVIDED HISTORY: TRAUMA TECHNOLOGIST PROVIDED HISTORY: trauma Reason for Exam: fall Acuity: Acute Type of Exam: Initial FINDINGS: BONES/ALIGNMENT: There is no acute fracture or traumatic malalignment. Central plate depression P6-S5 appears chronic.  DEGENERATIVE CHANGES: Severe multilevel degenerate changes. Multilevel ankylosis identified. Slight widening of the disc spaces identified at T5-T6 and T12-L1 likely degenerative severe multilevel disc space disease involving the lumbar spine with multilevel vacuum disc phenomena. SOFT TISSUES: No paraspinal mass is seen. Moderate severe multilevel degenerate changes of the thoracic spine and severe multilevel degenerate changes of the lumbar spine. No convincing evidence acute fracture traumatic malalignment     Mri Cervical Spine Wo Contrast    Result Date: 7/30/2020  EXAMINATION: MRI OF THE CERVICAL SPINE WITHOUT CONTRAST 7/30/2020 6:12 pm TECHNIQUE: Multiplanar multisequence MRI of the cervical spine was performed without the administration of intravenous contrast. COMPARISON: CT cervical spine July 29, 2020 HISTORY: ORDERING SYSTEM PROVIDED HISTORY: possible unstable spine TECHNOLOGIST PROVIDED HISTORY: possible unstable spine Reason for Exam: possible unstable spine FINDINGS: BONES/ALIGNMENT: There is straightening of normal cervical lordosis. There is partial osseous fusion at the posterior aspects of C2-3 and C3-4. There are mild chronic compression deformities at the superior endplates of T1 and T2. The cervical vertebral body heights are grossly maintained, without acute fracture or destructive osseous lesion. There is 2 mm C4 on C5 anterolisthesis, 1-2 mm C6 on C7 anterolisthesis. There is degenerative disc disease with disc desiccation, disc space narrowing and endplate changes. 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 SPINAL CORD: There is increased T2 signal in the cervical spinal cord at C1-2 level, likely related to spinal cord edema. 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 SOFT TISSUES: There is increased T2 signal in the asymmetrically widened right lateral atlanto-dens interval, nonspecific. Injury of the alar ligaments cannot be excluded.  C1-2: There is moderate narrowing of the spinal canal with mild spinal cord compression. C2-C3: There is no significant disc herniation. There is mild spinal canal narrowing, moderate left foraminal narrowing. There is mild bilateral facet arthrosis with fusion of the bilateral facet joints. C3-C4: There is no significant disc herniation. There is mild spinal canal narrowing, moderate bilateral foraminal narrowing. There is moderate left facet arthrosis with fusion of the facet joint. C4-C5: There is anterolisthesis, disc bulge, severe left and mild right facet arthrosis, with mild-to-moderate spinal canal narrowing, mild spinal cord compression, moderate to severe bilateral foraminal narrowing. C5-C6: There is disc bulge, severe left and mild right facet arthrosis, with minimal spinal canal narrowing, moderate to severe bilateral foraminal narrowing. C6-C7: There is disc bulge, moderate left and mild right facet arthrosis, with mild spinal canal narrowing, and moderate bilateral foraminal narrowing. C7-T1: There is no significant disc protrusion, spinal canal stenosis or neural foraminal narrowing. Increased T2 signal in the cervical spinal cord at C1-2 level, likely related to spinal cord edema. Increased T2 signal in the asymmetrically widened right lateral atlanto-dens interval, nonspecific. Injury of the alar ligaments cannot be excluded. Degenerative disc disease as described above, exacerbating congenitally narrow cervical spinal canal. Spinal canal narrowing, moderate at C1-2 with mild spinal cord compression, mild to moderate at C4-5 with mild spinal cord compression, mild at C2-3, C3-4 and C6-7, minimal at C5-6. Foraminal narrowing, moderate to severe at bilateral C4-5 and bilateral C5-6, moderate at left C2-3, bilateral C3-4 and bilateral C6-7. 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      Xr Shoulder Left (min 2 Views)    Result Date: 7/30/2020  EXAMINATION: TWO XRAY VIEWS OF THE LEFT SHOULDER 7/30/2020 11:44 am COMPARISON: None HISTORY: ORDERING SYSTEM PROVIDED HISTORY: shoulder pain TECHNOLOGIST PROVIDED HISTORY: shoulder pain Reason for Exam: shoulder pain Acuity: Unknown Acute left shoulder pain. FINDINGS: The humeral head aligns normally with the glenoid fossae. Coracoclavicular and acromioclavicular interval are within normal range. No visible fracture. No dislocation. There is a calcified granuloma in the left upper lobe. 1. No acute radiographic finding to account for patient's left shoulder pain. Xr Chest Portable    Result Date: 7/29/2020  EXAMINATION: ONE XRAY VIEW OF THE CHEST 7/29/2020 3:56 pm COMPARISON: None. HISTORY: ORDERING SYSTEM PROVIDED HISTORY: pCO2 high, no SOB TECHNOLOGIST PROVIDED HISTORY: pCO2 high, no SOB FINDINGS: No focal consolidation. Mild cardiomegaly. No pulmonary edema. Calcified granuloma. No acute findings. Cta Head Neck W Contrast    Result Date: 7/30/2020  EXAMINATION: CTA OF THE HEAD AND NECK WITH CONTRAST 7/29/2020 1:40 pm: TECHNIQUE: CTA of the head and neck was performed with the administration of intravenous contrast. Multiplanar reformatted images are provided for review. MIP images are provided for review. Stenosis of the internal carotid arteries measured using NASCET criteria. Dose modulation, iterative reconstruction, and/or weight based adjustment of the mA/kV was utilized to reduce the radiation dose to as low as reasonably achievable. COMPARISON: None. HISTORY: ORDERING SYSTEM PROVIDED HISTORY: stroke symptoms, L sided weakness, LKW 1210pm TECHNOLOGIST PROVIDED HISTORY: stroke symptoms, L sided weakness, LKW 1210pm Reason for Exam: stroke, L sided weakness. LKW 1210pm Acuity: Unknown Type of Exam: Unknown Initial evaluation. FINDINGS: CTA NECK: AORTIC ARCH/ARCH VESSELS: No significant abnormality of the aortic arch is identified.   Incidental note is made of the left vertebral artery originating directly the bulb. The left vertebral artery originates directly from the aortic arch which is a normal anatomic variant. No significant abnormality of the intracranial circulation. Incidental note is made of fetal origin of the right posterior cerebral artery which is a normal anatomic variant. Ct Chest Abdomen Pelvis W Contrast    Result Date: 7/30/2020  EXAMINATION: CT OF THE CHEST, ABDOMEN, AND PELVIS WITH CONTRAST 7/29/2020 7:05 pm TECHNIQUE: CT of the chest, abdomen and pelvis was performed with the administration of intravenous contrast. Multiplanar reformatted images are provided for review. Dose modulation, iterative reconstruction, and/or weight based adjustment of the mA/kV was utilized to reduce the radiation dose to as low as reasonably achievable. COMPARISON: None. HISTORY: ORDERING SYSTEM PROVIDED HISTORY: trauma TECHNOLOGIST PROVIDED HISTORY: trauma Reason for Exam: fall Acuity: Acute Type of Exam: Initial Patient fell hitting head when opening front door. FINDINGS: Chest: Mediastinum: No mediastinal adenopathy or acute aortic abnormality. Calcification aortic arch is noted. Mild cardiomegaly. Coronary artery calcification. No pericardial effusion. Lungs/pleura: Mild emphysematous changes. No effusion, focal consolidation, or extrapleural air. Tracheobronchial tree is patent. Left upper lobe granuloma. Soft Tissues/Bones: Non-acute appearing fracture C7 spinous process versus soft tissue ligamentous calcification. No acute appearing osseous abnormality. No axillary adenopathy or acute soft tissue abnormality. Abdomen/Pelvis: Organs: Liver, gallbladder, pancreas, spleen, and adrenals are unremarkable. Kidneys contain multiple bilateral cysts largest in the right kidney of 5 cm and in the left kidney of 3.5 cm. No urinary obstruction. GI/Bowel: No free fluid, free air, bowel obstruction or bowel wall thickening. Increased colonic stool load. Pelvis: Bladder is intact.   No abnormal fluid collections, pelvic or inguinal adenopathy. Bilateral fat containing inguinal hernias are noted without strangulation. Prostate is mildly enlarged. Peritoneum/Retroperitoneum:   No acute aortic abnormality; no aneurysm. No retroperitoneal or mesenteric adenopathy. Bones/Soft Tissues: Degenerative changes are present in the hips and lower lumbar facets. Multilevel degenerative and degenerative disc changes are noted. CT chest: Emphysematous changes. No extrapleural air, effusion or acute pulmonary findings. Atherosclerotic disease. CT abdomen and pelvis: No solid organ injury. No acute abdominopelvic process. Osseous findings as above. Mri Limited Brain    Result Date: 7/30/2020  EXAMINATION: MRI OF THE BRAIN WITHOUT CONTRAST  7/29/2020 3:00 pm TECHNIQUE: Multiplanar multisequence MRI of the brain was performed without the administration of intravenous contrast. COMPARISON: CT head 01/29/2020 HISTORY: ORDERING SYSTEM PROVIDED HISTORY: add MARYELLEN Evboom for stroke TECHNOLOGIST PROVIDED HISTORY: add SHAMIKA. [employer] for stroke Initial evaluation FINDINGS: INTRACRANIAL STRUCTURES/VENTRICLES: There is a punctate area of restricted diffusion measuring 3 mm medially in the posterior left frontal lobe along the falx that is suggestive of an acute infarct or subacute infarct. There is high signal in some of the sulci over the high right parietal lobe with corresponding low signal on the gradient echo images suggestive of subarachnoid hemorrhage. There is subarachnoid hemorrhage in the pre frontal sulcus. There is diffuse cerebral atrophy. There are chronic small vessel ischemic changes. There are several punctate areas of low signal scattered throughout the brain on the gradient echo images suggestive of previous areas of microhemorrhage. The findings were discussed with Dr. Tiffany Calixto, 07/29/2020 at 3:18 p.m. Punctate 3 mm acute to subacute infarct in the posterior right frontal lobe adjacent to the falx.   No other areas of restricted diffusion. There is subarachnoid hemorrhage over the high right parietal lobe and posterior right frontal lobe. Cerebral atrophy. Chronic small vessel ischemic changes. Assessment & Differential Dx:      Primary Problem  <principal problem not specified>    Active Hospital Problems    Diagnosis Date Noted    Cerebrovascular accident (CVA) ([address] [I63.9]     Acute cerebral infarction ([address] [I63.9] 07/30/2020    Left hemiparesis ([address] [G81.94] 07/30/2020    Parkinson disease ([address] Jessenia Lars 07/30/2020    Subarachnoid hemorrhage ([address] [I60.9] 07/29/2020       Case of 81-year-old WM with a fall story along with left arm and left leg weakness, Hx of Parkinson with baseline gait apraxia on Sinemet 25/100 twice daily, symmetrel 100 po BID and mysoline 100 mg BID, reporting arthritic right knee to have upcoming knee replacement,  -He hit his head to the wall, no LOC,  -Head CT with right parietal frontal cortical subarachnoid hemorrhage  With possible small acute right parietal infarction with bilateral chronic periventricular small vessel ischemia  -Head cervical spine: Abnormal C1-2 positioning  -CT thoracic spine:  Moderate degenerative changes  -CT lumbar spine: Severe degenerative changes  -CTA head and neck: Left ICA 50-60% stenosis, right ICA 20% stenosis  -MRI head: Right parietal frontal cortical subarachnoid hemorrhage with acute right high parietal infarction with bilateral chronic periventricular small vessel ischemia      Impression:  -Fall with a posttraumatic subarachnoid hemorrhage  -right cerebral infarction left hemiparesis  -Parkinson disease  -unstable cervical spine  -possible underlying dementia  -Hyponatremia     Plan:     -MRI brain W0: Punctuate 3 mm acute to subacute infarct in the posterior right frontal lobe adjacent to the falx, there is subarachnoid hemorrhage over the high right parietal lobe and posterior right frontal lobe, cerebral atrophy  -No TPA due to traumatic subarachnoid hemorrhage  -Trauma surgery and neurosurgery consulted: No neurosurgery intervention needed,  -MRI cervical spine W 0 contrast: Increased T2 signal in the cervical spine cord at C1-2 level likely 2 spinal cord edema, increased 3 to spinal in the soft symmetry widened right lateral atlanto dens, degenerative disc disease, spinal canal narrowing, moderate at C1-2 with mild spinal cord compression-underwent laminectomy and fusion  -TTE with bubble study: Negative   -Folic acid 1 mg twice daily  -Aspirin 81 mg  -continue atorvastatin 20 mg nightly  -Started on [facility] for DVT prophylaxis   -Cervical collar- as per trauma/nerosurgery   -PT/OT/speech  -Urine growing staph aureus.  MSSA, started on Cipro for 5 days.   -Propranolol for tremors  -started on Norvasc 10 mg for hypertension   -Gentle hydration/ follow up on         DC to rehab     Elihu Gottron, MD  PGY-2, Internal medicine resident/Neurologu service   [address] - [place], [place]

## 2020-08-06 NOTE — DISCHARGE SUMMARY
Neurology Discharge Summary     Patient ID: Shivam Reed  :  1935   MRN: [id number]     ACCOUNT:  [de-identified]   Patient's PCP: Alex Mello MD  Admit Date: 2020   Discharge Date: 2020     Length of Stay: 8  Code Status:  Prior  Admitting Physician: Luisa Lira MD  Discharge Physician: Krzysztof Gerard MD     Active Discharge Diagnoses:       Primary Problem  <principal problem not specified>      [place] Problems    Diagnosis Date Noted    Cerebrovascular accident (CVA) [facility]) [I63.9]     Acute cerebral infarction ([facility] [I63.9] 2020    Left hemiparesis ([facility] [G81.94] 2020    Parkinson disease ([facility] [address] [facility] 2020    Subarachnoid hemorrhage ([facility] [I60.9] 2020       Condition on the discharge: good     Hospital Stay:       Hospital Course:    81 y.o. male that presented to the Emergency Department following fall from standing height. Patient states he went to go get a delivery at home today, when he fell and struck his head on the wall. His daughter reports that the wall was dented. Patient states he did not lose consciousness denies anticoagulation. He was found to have left facial droop, left upper extremity weakness and due to concern for stroke he had CT head without contrast.  Neurology plan to give him TPA and so he underwent emergent brain MRI which showed a small subarachnoid hemorrhage over the high right parietal lobe and posterior right frontal lobe. MRI cervical spine showed foraminal narrowing, neurosurgery consulted s/p C1-2 laminectomy, C4-5 Laminectomy with C5 left foraminotomy, C4-5 fusion    Significant therapeutic interventions: Laminectomy     Significant Diagnostic Studies: MRI      Radiology:    Xr Cervical Spine (2-3 Views)    Result Date: 2020  EXAMINATION: 2 XRAY VIEWS OF THE CERVICAL SPINE 2020 9:21 am COMPARISON: CT cervical spine 2020.  HISTORY: ORDERING SYSTEM PROVIDED HISTORY: post op TECHNOLOGIST PROVIDED HISTORY: Obtain upright ap and lat post op FINDINGS: Postsurgical changes status post posterior cervical fusion at C4-5 are noted. There is no disruption of the orthopedic hardware identified. No lucency is identified surrounding the orthopedic hardware. There is grade 1 anterolisthesis of C4 relative to C5 measuring 3 mm. There is grade 1 anterolisthesis of C5 relative to C6 measuring 2 mm. Alignment is not significantly changed from the previous CT scan. There is severe multilevel degenerative facet arthropathy. 1. Postsurgical changes status post posterior cervical fusion at C4-5. 2. Grade 1 anterolisthesis of C4 relative to C5 and C5 relative to C6, not significantly changed. 3. Severe multilevel degenerative facet arthropathy. Xr Cervical Spine (2-3 Views)    Result Date: 8/2/2020  EXAMINATION: 2 XRAY VIEWS OF THE CERVICAL SPINE 8/2/2020 2:25 pm COMPARISON: None. HISTORY: ORDERING SYSTEM PROVIDED HISTORY: intra op TECHNOLOGIST PROVIDED HISTORY: intra op FINDINGS: 2 images of the cervical spine were obtained intraoperatively. No radiologist was present at time of procedure. 5.8 seconds fluoroscopic time was utilized. 5.8 seconds fluoroscopic time was utilized intraoperatively. Xr Hand Right (min 3 Views)    Result Date: 7/30/2020  EXAMINATION: THREE XRAY VIEWS OF THE RIGHT HAND 7/30/2020 11:44 am COMPARISON: None. HISTORY: ORDERING SYSTEM PROVIDED HISTORY: hand pain TECHNOLOGIST PROVIDED HISTORY: hand pain Reason for Exam: hand pain Acuity: Unknown FINDINGS: Paralyzed osseous structure triscaphe and thumb CMC degenerative change. Degenerative changes of the 1st MCP and IP joint. Degenerative changes of the 3rd PIP joint. hook osteophytes of the 2nd 3rd metacarpals. Degenerative changes of the hand.      Xr Foot Left (min 3 Views)    Result Date: 7/30/2020  EXAMINATION: THREE XRAY VIEWS OF THE LEFT FOOT 7/30/2020 11:44 am COMPARISON: None HISTORY: ORDERING SYSTEM PROVIDED HISTORY: foot pain TECHNOLOGIST PROVIDED HISTORY: foot pain Reason for Exam: foot pain Acuity: Unknown Acute left foot pain. Initial encounter. FINDINGS: Moderate 1st MTP degenerative changes. Moderate midfoot degenerative changes. Lisfranc alignment is maintained. Hammertoe deformity of the 2nd digit. 1. No acute radiographic finding to account for patient's left foot pain. 2. Moderate 1st MTP degenerative changes. 3. Moderate midfoot degenerative changes. 4. Hammertoe deformity of the 2nd digit. Ct Head Wo Contrast    Result Date: 8/4/2020  EXAMINATION: CT OF THE HEAD WITHOUT CONTRAST  8/4/2020 3:30 pm TECHNIQUE: CT of the head was performed without the administration of intravenous contrast. Dose modulation, iterative reconstruction, and/or weight based adjustment of the mA/kV was utilized to reduce the radiation dose to as low as reasonably achievable. COMPARISON: 08/01/2020 and MR brain 07/29/2020 HISTORY: ORDERING SYSTEM PROVIDED HISTORY: more left sided weakness, knonw SAH and right sided stroke TECHNOLOGIST PROVIDED HISTORY: more left sided weakness, knonw SAH and right sided stroke FINDINGS: BRAIN/VENTRICLES: Patient's known cortical infarct within the medial right frontal lobe is much less conspicuous compared with the previous MRI, due to differences in technique. Subarachnoid hemorrhage has resolved. There is no new infarct or acute intracranial hemorrhage. There is no mass effect or midline shift. There is diffuse cerebral volume loss. Periventricular hypoattenuation is present. There is no abnormal extra-axial fluid collection. ORBITS: Limited evaluation of the orbits is unremarkable. SINUSES: The paranasal sinuses and mastoid air cells are clear. SOFT TISSUES/SKULL:  No lytic or blastic osseous lesions are identified. 1. Interval resolution of subarachnoid hemorrhage. 2. No new hemorrhage or new infarct evident.  3. Cortical based infarct within the medial right frontal lobe, less conspicuous compared with the previous MRI, due to differences in technique. Ct Head Wo Contrast    Result Date: 8/1/2020  EXAMINATION: CT OF THE HEAD WITHOUT CONTRAST  8/1/2020 11:43 am TECHNIQUE: CT of the head was performed without the administration of intravenous contrast. Dose modulation, iterative reconstruction, and/or weight based adjustment of the mA/kV was utilized to reduce the radiation dose to as low as reasonably achievable. COMPARISON: 29 July 2020 HISTORY: ORDERING SYSTEM PROVIDED HISTORY: follow up on subarachnoid bleed noted on earlier CT and new right frontal infarction TECHNOLOGIST PROVIDED HISTORY: follow up on subarachnoid bleed noted on earlier CT and new right frontal infarction Reason for Exam: follow up on subarachnoid bleed noted on earlier CT and new right frontal infarction Acuity: Acute Type of Exam: Initial FINDINGS: BRAIN/VENTRICLES: Small area of hyperattenuation in 1 of the sulci over the high right posterior parietal region is redemonstrated, less conspicuous than on prior study none the less suspicious for small subarachnoid hemorrhage. Positioning is suboptimal.  The patient's head is tilted. No hydrocephalus or midline shift is noted. Cavernous carotid arteries are calcified. Scattered hypodensity is present in the white matter consistent with chronic microvascular change. There is no change in a remote appearing left frontal infarct. No new areas of hemorrhage are noted. Chronic microvascular change is demonstrated. ORBITS: The visualized portion of the orbits demonstrate no acute abnormality. SINUSES: The visualized paranasal sinuses and mastoid air cells demonstrate no acute abnormality. SOFT TISSUES/SKULL:  No acute abnormality of the visualized skull or soft tissues. 1.  No significant change from prior study. Small area of hyperattenuation in 1 of the high right parietal sulci which is suspicious for focal subarachnoid hemorrhage.  2.  Senescent changes including chronic microvascular change and remote appearing left frontal infarct. Ct Head Wo Contrast    Result Date: 7/30/2020  EXAMINATION: CT OF THE HEAD WITHOUT CONTRAST,  7/29/2020 1:40 pm TECHNIQUE: CT of the head was performed without the administration of intravenous contrast. Dose modulation, iterative reconstruction, and/or weight based adjustment of the mA/kV was utilized to reduce the radiation dose to as low as reasonably achievable. COMPARISON: None HISTORY: ORDERING SYSTEM PROVIDED HISTORY: Stroke, L sided weakness. LKW 1210 pm TECHNOLOGIST PROVIDED HISTORY: Stroke, L sided weakness. LKW 1210 pm Reason for Exam: Stroke, L sided weakness. LKW 1210 pm Acuity: Unknown Type of Exam: Unknown Initial evaluation FINDINGS: BRAIN/VENTRICLES: There is mild cerebral atrophy. There is atherosclerotic calcification of the cavernous carotid arteries. There are areas of hypoattenuation in the periventricular white matter and centrum semiovale that are likely related to chronic small vessel ischemic disease. There is a focal area of hypoattenuation in the left frontal lobe that likely represents an infarct of indeterminate age. There is no midline shift or mass effect. There is a subtle questionable area of hyperdensity in one of the sulci over the high right parietal region. While this may be artifact, very subtle subarachnoid hemorrhage cannot be excluded. ORBITS: The visualized portion of the orbits demonstrate no acute abnormality. SINUSES:  The visualized paranasal sinuses and mastoid air cells are clear. SOFT TISSUES/SKULL:  No acute abnormality of the visualized skull or soft tissues. Cerebral atrophy. Chronic small vessel ischemic changes. Area of hypoattenuation in the left frontal periventricular white matter which likely represents an infarct of indeterminate age.  Questionable subtle hyperattenuation over the right frontal convexity which is suspicious for a tiny subarachnoid deformity of the dens with relative superior subluxation of the anterior arch of C1 in relation to the dens. No aggressive bony destructive lesion is seen. Small well-circumscribed lucencies are seen in the right side of the C6 vertebral body, probably cyst formation related to degenerative disease. There are well-circumscribed bony densities adjacent to the spinous processes of C7 and T1, consistent with old trauma. There is moderate neural foraminal stenosis on the left at C2-3. Severe left and moderate right C3-4 and severe bilateral C4-5 and C5-6 neural foraminal stenosis. Moderate to severe bilateral neural foraminal stenosis at C6-7. The spinal canal is difficult to evaluate on an unenhanced CT scan, especially at the C3-4 and C4-5 levels which is severely degraded by artifact from the hardware. MRI would be better to evaluate for significant central canal stenosis. SOFT TISSUES: There is no prevertebral soft tissue swelling. 1. No evidence of acute fracture or bony destructive lesion. 2. Postsurgical changes including recent posterior decompression at C2. Status post posterior fusion at C4-5. 3. Advanced degenerative change. Slight grade 1 anterolisthesis at C4-5 multi-level neural foraminal stenosis as described above. 4. Severe arthritic changes about C2 that could be related to degenerative or erosive arthritis. Slight superior subluxation of the anterior ring of the C1, felt to be chronic. Ct Cervical Spine Wo Contrast    Result Date: 7/29/2020  EXAMINATION: CT OF THE CERVICAL SPINE WITHOUT CONTRAST 7/29/2020 7:05 pm TECHNIQUE: CT of the cervical spine was performed without the administration of intravenous contrast. Multiplanar reformatted images are provided for review. Dose modulation, iterative reconstruction, and/or weight based adjustment of the mA/kV was utilized to reduce the radiation dose to as low as reasonably achievable.  COMPARISON: None HISTORY: ORDERING SYSTEM PROVIDED space disease involving the lumbar spine with multilevel vacuum disc phenomena. SOFT TISSUES: No paraspinal mass is seen. Moderate severe multilevel degenerate changes of the thoracic spine and severe multilevel degenerate changes of the lumbar spine. No convincing evidence acute fracture traumatic malalignment     Ct Lumbar Spine Wo Contrast    Result Date: 7/29/2020  EXAMINATION: CT OF THE THORACIC SPINE WITHOUT CONTRAST; CT OF THE LUMBAR SPINE WITHOUT CONTRAST 7/29/2020 TECHNIQUE: CT of the thoracic spine was performed without the administration of intravenous contrast. Multiplanar reformatted images are provided for review. Dose modulation, iterative reconstruction, and/or weight based adjustment of the mA/kV was utilized to reduce the radiation dose to as low as reasonably achievable.; CT of the lumbar spine was performed without the administration of intravenous contrast. Multiplanar reformatted images are provided for review. Dose modulation, iterative reconstruction, and/or weight based adjustment of the mA/kV was utilized to reduce the radiation dose to as low as reasonably achievable. COMPARISON: CT chest abdomen pelvis 07/29/2020 HISTORY: ORDERING SYSTEM PROVIDED HISTORY: trauma TECHNOLOGIST PROVIDED HISTORY: trauma Reason for Exam: fall Acuity: Acute Type of Exam: Initial; ORDERING SYSTEM PROVIDED HISTORY: TRAUMA TECHNOLOGIST PROVIDED HISTORY: trauma Reason for Exam: fall Acuity: Acute Type of Exam: Initial FINDINGS: BONES/ALIGNMENT: There is no acute fracture or traumatic malalignment. Central plate depression K7-D4 appears chronic. DEGENERATIVE CHANGES: Severe multilevel degenerate changes. Multilevel ankylosis identified. Slight widening of the disc spaces identified at T5-T6 and T12-L1 likely degenerative severe multilevel disc space disease involving the lumbar spine with multilevel vacuum disc phenomena. SOFT TISSUES: No paraspinal mass is seen.      Moderate severe multilevel degenerate changes of the thoracic spine and severe multilevel degenerate changes of the lumbar spine. No convincing evidence acute fracture traumatic malalignment     Mri Cervical Spine Wo Contrast    Result Date: 7/30/2020  EXAMINATION: MRI OF THE CERVICAL SPINE WITHOUT CONTRAST 7/30/2020 6:12 pm TECHNIQUE: Multiplanar multisequence MRI of the cervical spine was performed without the administration of intravenous contrast. COMPARISON: CT cervical spine July 29, 2020 HISTORY: ORDERING SYSTEM PROVIDED HISTORY: possible unstable spine TECHNOLOGIST PROVIDED HISTORY: possible unstable spine Reason for Exam: possible unstable spine FINDINGS: BONES/ALIGNMENT: There is straightening of normal cervical lordosis. There is partial osseous fusion at the posterior aspects of C2-3 and C3-4. There are mild chronic compression deformities at the superior endplates of T1 and T2. The cervical vertebral body heights are grossly maintained, without acute fracture or destructive osseous lesion. There is 2 mm C4 on C5 anterolisthesis, 1-2 mm C6 on C7 anterolisthesis. There is degenerative disc disease with disc desiccation, disc space narrowing and endplate changes. 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 SPINAL CORD: There is increased T2 signal in the cervical spinal cord at C1-2 level, likely related to spinal cord edema. 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 SOFT TISSUES: There is increased T2 signal in the asymmetrically widened right lateral atlanto-dens interval, nonspecific. Injury of the alar ligaments cannot be excluded. C1-2: There is moderate narrowing of the spinal canal with mild spinal cord compression. C2-C3: There is no significant disc herniation. There is mild spinal canal narrowing, moderate left foraminal narrowing. There is mild bilateral facet arthrosis with fusion of the bilateral facet joints. C3-C4: There is no significant disc herniation.   There is mild spinal canal narrowing, moderate bilateral foraminal narrowing. There is moderate left facet arthrosis with fusion of the facet joint. C4-C5: There is anterolisthesis, disc bulge, severe left and mild right facet arthrosis, with mild-to-moderate spinal canal narrowing, mild spinal cord compression, moderate to severe bilateral foraminal narrowing. C5-C6: There is disc bulge, severe left and mild right facet arthrosis, with minimal spinal canal narrowing, moderate to severe bilateral foraminal narrowing. C6-C7: There is disc bulge, moderate left and mild right facet arthrosis, with mild spinal canal narrowing, and moderate bilateral foraminal narrowing. C7-T1: There is no significant disc protrusion, spinal canal stenosis or neural foraminal narrowing. Increased T2 signal in the cervical spinal cord at C1-2 level, likely related to spinal cord edema. Increased T2 signal in the asymmetrically widened right lateral atlanto-dens interval, nonspecific. Injury of the alar ligaments cannot be excluded. Degenerative disc disease as described above, exacerbating congenitally narrow cervical spinal canal. Spinal canal narrowing, moderate at C1-2 with mild spinal cord compression, mild to moderate at C4-5 with mild spinal cord compression, mild at C2-3, C3-4 and C6-7, minimal at C5-6. Foraminal narrowing, moderate to severe at bilateral C4-5 and bilateral C5-6, moderate at left C2-3, bilateral C3-4 and bilateral C6-7. 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 Xr Shoulder Left (min 2 Views)    Result Date: 7/30/2020  EXAMINATION: TWO XRAY VIEWS OF THE LEFT SHOULDER 7/30/2020 11:44 am COMPARISON: None HISTORY: ORDERING SYSTEM PROVIDED HISTORY: shoulder pain TECHNOLOGIST PROVIDED HISTORY: shoulder pain Reason for Exam: shoulder pain Acuity: Unknown Acute left shoulder pain. FINDINGS: The humeral head aligns normally with the glenoid fossae. 50-60% narrowing of the left internal carotid artery just distal to the bulb. There is atherosclerotic disease in the proximal bulb with approximately 30% narrowing of the vessel. The left internal carotid artery is then patent through the skull base. There is atherosclerotic calcification in the region of the right internal carotid artery bulb with approximately 20% stenosis of the vessel. The right internal carotid artery is then patent through the skull base. VERTEBRAL ARTERIES: The right vertebral artery is dominant and patent in its visualized course. The left vertebral artery originates directly from the aortic arch and is patent in its visualized course. SOFT TISSUES: There is no acute abnormality of the visualized soft tissues. BONES: There are mild degenerative changes of the cervical spine. CTA HEAD: ANTERIOR CIRCULATION:  No abnormality of the internal carotid arteries, middle cerebral arteries, or anterior cerebral arteries are identified. POSTERIOR CIRCULATION:  No abnormality of the distal vertebral arteries, basilar artery, or posterior cerebral arteries are identified. Incidental note is made of fetal origin of the right posterior cerebral artery which is a normal anatomic variant. No obvious aneurysms are identified. OTHER: No dural venous sinus thrombosis on this non-dedicated study. Approximately 30% stenosis of the left internal carotid artery in the proximal left carotid bulb and approximately 50-60% stenosis of the left internal carotid artery just distal to the bulb. Atherosclerotic calcification of the right internal carotid artery with approximately 20% stenosis in the region of the bulb. The left vertebral artery originates directly from the aortic arch which is a normal anatomic variant. No significant abnormality of the intracranial circulation. Incidental note is made of fetal origin of the right posterior cerebral artery which is a normal anatomic variant.      Ct Chest Abdomen Pelvis W Contrast    Result Date: 7/30/2020  EXAMINATION: CT OF THE CHEST, ABDOMEN, AND PELVIS WITH CONTRAST 7/29/2020 7:05 pm TECHNIQUE: CT of the chest, abdomen and pelvis was performed with the administration of intravenous contrast. Multiplanar reformatted images are provided for review. Dose modulation, iterative reconstruction, and/or weight based adjustment of the mA/kV was utilized to reduce the radiation dose to as low as reasonably achievable. COMPARISON: None. HISTORY: ORDERING SYSTEM PROVIDED HISTORY: trauma TECHNOLOGIST PROVIDED HISTORY: trauma Reason for Exam: fall Acuity: Acute Type of Exam: Initial Patient fell hitting head when opening front door. FINDINGS: Chest: Mediastinum: No mediastinal adenopathy or acute aortic abnormality. Calcification aortic arch is noted. Mild cardiomegaly. Coronary artery calcification. No pericardial effusion. Lungs/pleura: Mild emphysematous changes. No effusion, focal consolidation, or extrapleural air. Tracheobronchial tree is patent. Left upper lobe granuloma. Soft Tissues/Bones: Non-acute appearing fracture C7 spinous process versus soft tissue ligamentous calcification. No acute appearing osseous abnormality. No axillary adenopathy or acute soft tissue abnormality. Abdomen/Pelvis: Organs: Liver, gallbladder, pancreas, spleen, and adrenals are unremarkable. Kidneys contain multiple bilateral cysts largest in the right kidney of 5 cm and in the left kidney of 3.5 cm. No urinary obstruction. GI/Bowel: No free fluid, free air, bowel obstruction or bowel wall thickening. Increased colonic stool load. Pelvis: Bladder is intact. No abnormal fluid collections, pelvic or inguinal adenopathy. Bilateral fat containing inguinal hernias are noted without strangulation. Prostate is mildly enlarged. Peritoneum/Retroperitoneum:   No acute aortic abnormality; no aneurysm. No retroperitoneal or mesenteric adenopathy.  Bones/Soft Tissues: Degenerative changes are present in the hips and lower lumbar facets. Multilevel degenerative and degenerative disc changes are noted. CT chest: Emphysematous changes. No extrapleural air, effusion or acute pulmonary findings. Atherosclerotic disease. CT abdomen and pelvis: No solid organ injury. No acute abdominopelvic process. Osseous findings as above. Mri Limited Brain    Result Date: 7/30/2020  EXAMINATION: MRI OF THE BRAIN WITHOUT CONTRAST  7/29/2020 3:00 pm TECHNIQUE: Multiplanar multisequence MRI of the brain was performed without the administration of intravenous contrast. COMPARISON: CT head 01/29/2020 HISTORY: ORDERING SYSTEM PROVIDED HISTORY: add GRE. Evalaute for stroke TECHNOLOGIST PROVIDED HISTORY: add GRE. Evalaute for stroke Initial evaluation FINDINGS: INTRACRANIAL STRUCTURES/VENTRICLES: There is a punctate area of restricted diffusion measuring 3 mm medially in the posterior left frontal lobe along the falx that is suggestive of an acute infarct or subacute infarct. There is high signal in some of the sulci over the high right parietal lobe with corresponding low signal on the gradient echo images suggestive of subarachnoid hemorrhage. There is subarachnoid hemorrhage in the pre frontal sulcus. There is diffuse cerebral atrophy. There are chronic small vessel ischemic changes. There are several punctate areas of low signal scattered throughout the brain on the gradient echo images suggestive of previous areas of microhemorrhage. The findings were discussed with Dr. Aisha Crawley, 07/29/2020 at 3:18 p.m. Punctate 3 mm acute to subacute infarct in the posterior right frontal lobe adjacent to the falx. No other areas of restricted diffusion. There is subarachnoid hemorrhage over the high right parietal lobe and posterior right frontal lobe. Cerebral atrophy. Chronic small vessel ischemic changes.          Consultations:    Consults:     Final Specialist Recommendations/Findings:   IP CONSULT TO TRAUMA SURGERY  IP CONSULT TO NEUROSURGERY  IP CONSULT TO PHYSICAL MEDICINE REHAB  IP CONSULT TO NEUROSURGERY  IP CONSULT TO CARDIOLOGY  PHARMACY TO DOSE VANCOMYCIN      The patient was seen and examined on day of discharge and this discharge summary is in conjunction with any daily progress note from day of discharge. Discharge plan:       Disposition: To a non-[facility] facility    Physician Follow Up:     Yeni Aly MD  [id number]  [phone]    Schedule an appointment as soon as possible for a visit  Follow up with PCP re: hospital visit     Sabrina Morenoufmann, Alexandra[address] [facility]  [phone]    Schedule an appointment as soon as possible for a visit  Follow up with Neurosurgery     [facility]  [address]  [address] [facility] [address] [place] [address]  [phone]  Call  Follow up with Trauma Surgery as needed    [facility]  [facility] [place]  [phone]    Follow up with Cardiology     Sabrina Bermeo, [address] [facility]  [phone]    In 2 weeks  Please follow up in 2 weeks for suture removal.        Requiring Further Evaluation/Follow Up POST HOSPITALIZATION/Incidental Findings:     Diet: regular diet    Activity: As tolerated    Instructions to Patient:       Discharge Medications:      Medication List      START taking these medications    amLODIPine 10 MG tablet  Commonly known as:  NORVASC  Take 1 tablet by mouth daily     ciprofloxacin 500 MG tablet  Commonly known as:  CIPRO  Take 1 tablet by mouth every 12 hours for 5 days     methocarbamol 500 MG tablet  Commonly known as:  ROBAXIN  Take 1 tablet by mouth 4 times daily for 10 days     oxyCODONE 5 MG immediate release tablet  Commonly known as:  ROXICODONE  Take 1 tablet by mouth every 4 hours as needed for Pain for up to 3 days.      sodium chloride 1 g tablet  Take 1 tablet by mouth 3 times daily (with meals) for 2 days        CHANGE how you take these medications    atorvastatin 20 MG tablet  Commonly known as:  LIPITOR  Take 1 tablet by mouth nightly  What changed:    · medication strength  · how much to take  · when to take this     propranolol 60 MG extended release capsule  Commonly known as:  INDERAL LA  What changed:  Another medication with the same name was removed. Continue taking this medication, and follow the directions you see here. CONTINUE taking these medications    amantadine 100 MG capsule  Commonly known as:  SYMMETREL     carbidopa 25 MG Tabs tablet  Commonly known as:  LODOSYN     * carbidopa-levodopa  MG per extended release tablet  Commonly known as:  SINEMET CR     * carbidopa-levodopa  MG per tablet  Commonly known as:  SINEMET     citalopram 20 MG tablet  Commonly known as:  CELEXA     docusate sodium 100 MG capsule  Commonly known as:  COLACE     doxazosin 2 MG tablet  Commonly known as:  CARDURA     finasteride 5 MG tablet  Commonly known as:  PROSCAR     ibuprofen 600 MG tablet  Commonly known as:  ADVIL;MOTRIN     primidone 50 MG tablet  Commonly known as: MYSOLINE         * This list has 2 medication(s) that are the same as other medications prescribed for you. Read the directions carefully, and ask your doctor or other care provider to review them with you.             STOP taking these medications    UNABLE TO FIND           Where to Get Your Medications      These medications were sent to [facility] [address] [facility], [address] [place] [address], [place] [id number]    Phone:  [phone]   · amLODIPine 10 MG tablet  · ciprofloxacin 500 MG tablet  · methocarbamol 500 MG tablet  · sodium chloride 1 g tablet     You can get these medications from any pharmacy    Bring a paper prescription for each of these medications  · atorvastatin 20 MG tablet  · oxyCODONE 5 MG immediate release tablet         Time Spent on discharge

## 2020-09-15 ENCOUNTER — OFFICE VISIT (OUTPATIENT)
Dept: NEUROSURGERY | Age: 85
End: 2020-09-15

## 2020-09-15 VITALS
WEIGHT: 165 LBS | OXYGEN SATURATION: 98 % | TEMPERATURE: 97.3 F | HEIGHT: 67 IN | SYSTOLIC BLOOD PRESSURE: 89 MMHG | BODY MASS INDEX: 25.9 KG/M2 | HEART RATE: 66 BPM | DIASTOLIC BLOOD PRESSURE: 53 MMHG

## 2020-09-15 PROCEDURE — 99024 POSTOP FOLLOW-UP VISIT: CPT | Performed by: NURSE PRACTITIONER

## 2020-09-15 RX ORDER — LISINOPRIL 10 MG/1
1 TABLET ORAL DAILY
COMMUNITY
Start: 2020-08-31

## 2020-09-15 RX ORDER — SENNA PLUS 8.6 MG/1
2 TABLET ORAL DAILY PRN
COMMUNITY

## 2020-09-15 RX ORDER — HYDROCODONE BITARTRATE AND ACETAMINOPHEN 5; 325 MG/1; MG/1
1 TABLET ORAL EVERY 12 HOURS PRN
Qty: 14 TABLET | Refills: 0 | Status: SHIPPED | OUTPATIENT
Start: 2020-09-15 | End: 2020-09-22

## 2020-09-15 RX ORDER — METHOCARBAMOL 500 MG/1
1 TABLET, FILM COATED ORAL 4 TIMES DAILY
COMMUNITY
Start: 2020-08-31

## 2020-09-15 NOTE — PROGRESS NOTES
Valentino Alvarado  [facility] [address] [facility] [address] [facility] [address] [address]  Dept: [phone]    Patient:  Anu Guadalupe  YOB: 1935  Date: 9/15/20    The patient is a 81 y.o. male who presents today for consult of the following problems:     Chief Complaint   Patient presents with    Post-Op Check    Results     CT Head XR Cervical 08/04/20 CT Cervical 08/03/20         HPI:     Anu Guadalupe is a 81 y.o. male who presents to the office for post-op evaluation s/p C1-2 laminectomy, C4-5 laminectomy, C5 left foraminotomy, C4-5 fusion following acute spinal cord injury. This is patient's first follow-up appointment since hospital discharge, was discharged to a rehab facility and subsequent nursing facility. Does have persistent left sided weakness. Was also transitioned to soft collar approximately 2 to 3 weeks ago per patient's daughter, pain has been managed at the nursing facility with 600 mg ibuprofen. Patient's head is positioned to the left side, unable to straighten neck, endorses a lot of axial pain with movement. Incision well-healed  Sensation intact  Motor   L deltoid 1/5; R deltoid 5/5  L biceps 2+/5; R biceps 5/5  L triceps 2+/5; R triceps 5/5  L wrist extension 2-/5; R wrist extension 5/5  L HG 2+/5 R HG 5/5     L iliopsoas 1/5 , R iliopsoas 5/5  L quadriceps 1/5; R quadriceps 5/5  L Dorsiflexion 3-/5; R dorsiflexion 5/5  L Plantarflexion 3-/5; R plantarflexion 5/5  L EHL 1/5; R EHL 5/5    Date of surgery: 8/2/2020    Assessment and Plan:      1. Injury of cervical spinal cord, subsequent encounter (RUST 75.)    2. S/P cervical spinal fusion          Plan: Attempted to obtain upright x-rays, insurance does not cover imaging here. Patient will need to have a complete at an outside facility. Daughter to arrange.   Advised to stop taking NSAID as the patient is status post fusion, patient continues to have significant axial pain, will treat with Norco

## 2020-09-18 ENCOUNTER — HOSPITAL ENCOUNTER (OUTPATIENT)
Age: 85
Setting detail: SPECIMEN
Discharge: HOME | End: 2020-09-18
Payer: COMMERCIAL

## 2020-09-18 LAB
-: ABNORMAL
AMORPHOUS: ABNORMAL
BACTERIA: ABNORMAL
BILIRUBIN URINE: ABNORMAL
CASTS UA: ABNORMAL /LPF (ref 0–2)
CASTS UA: ABNORMAL /LPF (ref 0–2)
COLOR: ABNORMAL
COMMENT UA: ABNORMAL
CRYSTALS, UA: ABNORMAL /HPF
EPITHELIAL CELLS UA: ABNORMAL /HPF (ref 0–5)
GLUCOSE URINE: NEGATIVE
KETONES, URINE: ABNORMAL
LEUKOCYTE ESTERASE, URINE: ABNORMAL
MUCUS: ABNORMAL
NITRITE, URINE: NEGATIVE
OTHER OBSERVATIONS UA: ABNORMAL
PH UA: 6.5 (ref 5–8)
PROTEIN UA: ABNORMAL
RBC UA: ABNORMAL /HPF (ref 0–2)
RENAL EPITHELIAL, UA: ABNORMAL /HPF
SPECIFIC GRAVITY UA: 1.02 (ref 1–1.03)
TRICHOMONAS: ABNORMAL
TURBIDITY: ABNORMAL
URINE HGB: ABNORMAL
UROBILINOGEN, URINE: NORMAL
WBC UA: ABNORMAL /HPF (ref 0–5)
YEAST: ABNORMAL

## 2020-09-18 PROCEDURE — 86403 PARTICLE AGGLUT ANTBDY SCRN: CPT

## 2020-09-18 PROCEDURE — 81001 URINALYSIS AUTO W/SCOPE: CPT

## 2020-09-18 PROCEDURE — 87086 URINE CULTURE/COLONY COUNT: CPT

## 2020-09-19 LAB
CULTURE: ABNORMAL
Lab: ABNORMAL
SPECIMEN DESCRIPTION: ABNORMAL

## 2020-09-24 NOTE — PROGRESS NOTES
Notified the patient's daughter and which she stated she will get with [facility] facility to set up appointment and transport. She will call us back.

## 2020-09-30 ENCOUNTER — HOSPITAL ENCOUNTER (OUTPATIENT)
Age: 85
Setting detail: SPECIMEN
Discharge: HOME | End: 2020-09-30
Payer: COMMERCIAL

## 2020-09-30 LAB
ALBUMIN SERPL-MCNC: 2.8 G/DL (ref 3.5–5.2)
ALBUMIN/GLOBULIN RATIO: 1.1 (ref 1–2.5)
ALP BLD-CCNC: 91 U/L (ref 40–129)
ALT SERPL-CCNC: <5 U/L (ref 5–41)
ANION GAP SERPL CALCULATED.3IONS-SCNC: 12 MMOL/L (ref 9–17)
AST SERPL-CCNC: 36 U/L
BILIRUB SERPL-MCNC: 0.47 MG/DL (ref 0.3–1.2)
BUN BLDV-MCNC: 13 MG/DL (ref 8–23)
BUN/CREAT BLD: ABNORMAL (ref 9–20)
CALCIUM SERPL-MCNC: 8.1 MG/DL (ref 8.6–10.4)
CHLORIDE BLD-SCNC: 88 MMOL/L (ref 98–107)
CHOLESTEROL/HDL RATIO: 2.1
CHOLESTEROL: 83 MG/DL
CO2: 20 MMOL/L (ref 20–31)
CREAT SERPL-MCNC: 0.35 MG/DL (ref 0.7–1.2)
ESTIMATED AVERAGE GLUCOSE: 111 MG/DL
GFR AFRICAN AMERICAN: >60 ML/MIN
GFR NON-AFRICAN AMERICAN: >60 ML/MIN
GFR SERPL CREATININE-BSD FRML MDRD: ABNORMAL ML/MIN/{1.73_M2}
GFR SERPL CREATININE-BSD FRML MDRD: ABNORMAL ML/MIN/{1.73_M2}
GLUCOSE BLD-MCNC: 89 MG/DL (ref 70–99)
HBA1C MFR BLD: 5.5 % (ref 4–6)
HCT VFR BLD CALC: 33.7 % (ref 40.7–50.3)
HDLC SERPL-MCNC: 40 MG/DL
HEMOGLOBIN: 11.2 G/DL (ref 13–17)
LDL CHOLESTEROL: 35 MG/DL (ref 0–130)
MCH RBC QN AUTO: 30.9 PG (ref 25.2–33.5)
MCHC RBC AUTO-ENTMCNC: 33.2 G/DL (ref 28.4–34.8)
MCV RBC AUTO: 92.8 FL (ref 82.6–102.9)
NRBC AUTOMATED: 0 PER 100 WBC
PDW BLD-RTO: 12.8 % (ref 11.8–14.4)
PLATELET # BLD: 196 K/UL (ref 138–453)
PMV BLD AUTO: 11.5 FL (ref 8.1–13.5)
POTASSIUM SERPL-SCNC: 4.6 MMOL/L (ref 3.7–5.3)
RBC # BLD: 3.63 M/UL (ref 4.21–5.77)
SODIUM BLD-SCNC: 120 MMOL/L (ref 135–144)
TOTAL PROTEIN: 5.4 G/DL (ref 6.4–8.3)
TRIGL SERPL-MCNC: 42 MG/DL
TSH SERPL DL<=0.05 MIU/L-ACNC: 1.72 MIU/L (ref 0.3–5)
VLDLC SERPL CALC-MCNC: ABNORMAL MG/DL (ref 1–30)
WBC # BLD: 6.4 K/UL (ref 3.5–11.3)

## 2020-09-30 PROCEDURE — 83036 HEMOGLOBIN GLYCOSYLATED A1C: CPT

## 2020-09-30 PROCEDURE — P9603 ONE-WAY ALLOW PRORATED MILES: HCPCS

## 2020-09-30 PROCEDURE — 36415 COLL VENOUS BLD VENIPUNCTURE: CPT

## 2020-09-30 PROCEDURE — 80061 LIPID PANEL: CPT

## 2020-09-30 PROCEDURE — 85027 COMPLETE CBC AUTOMATED: CPT

## 2020-09-30 PROCEDURE — 80053 COMPREHEN METABOLIC PANEL: CPT

## 2020-09-30 PROCEDURE — 84443 ASSAY THYROID STIM HORMONE: CPT

## 2020-11-03 PROBLEM — I63.9 CEREBROVASCULAR ACCIDENT (CVA) (HCC): Status: RESOLVED | Noted: 2020-11-03 | Resolved: 2020-11-03

## (undated) DEVICE — GOWN,AURORA,NONREINFORCED,LARGE: Brand: MEDLINE

## (undated) DEVICE — ELECTRODE PT RET AD L9FT HI MOIST COND ADH HYDRGEL CORDED

## (undated) DEVICE — GOWN,AURORA,NONRNF,XL,30/CS: Brand: MEDLINE

## (undated) DEVICE — 3M™ IOBAN™ 2 ANTIMICROBIAL INCISE DRAPE 6650EZ: Brand: IOBAN™ 2

## (undated) DEVICE — THE MILL DISPOSABLE - MEDIUM

## (undated) DEVICE — BLADE ES ELASTOMERIC COAT INSUL DURABLE BEND UPTO 90DEG

## (undated) DEVICE — KIT EVAC 0.13IN RECT TB DIA10FR 400CC PVC 3 SPR Y CONN DRN

## (undated) DEVICE — NEEDLE HYPO 25GA L1.5IN BLU POLYPR HUB S STL REG BVL STR

## (undated) DEVICE — 3.0MM PRECISION NEURO (MATCH HEAD)

## (undated) DEVICE — SYRINGE IRRIG 60ML SFT PLIABLE BLB EZ TO GRP 1 HND USE W/

## (undated) DEVICE — COVER,MAYO STAND,STERILE: Brand: MEDLINE

## (undated) DEVICE — GLOVE EXAM M L95IN FNGR THK35MIL PALM THK24MIL OFF WHT

## (undated) DEVICE — GARMENT,MEDLINE,DVT,INT,CALF,MED, GEN2: Brand: MEDLINE

## (undated) DEVICE — KIT DRN FLAT W/ 100CC EVAC 7MM FULL PERF

## (undated) DEVICE — SPONGE LAP W18XL18IN WHT COT 4 PLY FLD STRUNG RADPQ DISP ST

## (undated) DEVICE — DRESSING BORDERED ADH GZ UNIV GEN USE 8INX4IN AND 6INX2IN

## (undated) DEVICE — MARKER,SKIN,WI/RULER AND LABELS: Brand: MEDLINE

## (undated) DEVICE — SYRINGE MED 10ML TRNSLUC BRL PLUNG BLK MRK POLYPR CTRL

## (undated) DEVICE — Device

## (undated) DEVICE — GAUZE,SPONGE,FLUFF,6"X6.75",STRL,5/TRAY: Brand: MEDLINE

## (undated) DEVICE — CONNECTOR TBNG WHT PLAS SUCT STR 5IN1 LTWT W/ M CONN

## (undated) DEVICE — ZINACTIVE USE 2539609 APPLICATOR MEDICATED 10.5 CC SOLUTION HI LT ORNG CHLORAPREP

## (undated) DEVICE — GLOVE ORANGE PI 7   MSG9070

## (undated) DEVICE — CONTAINER,SPECIMEN,4OZ,OR STRL: Brand: MEDLINE

## (undated) DEVICE — SUTURE STRATAFIX SYMMETRIC PDS + SZ 0 L18IN ABSRB L36MM SXPP1A401

## (undated) DEVICE — NEEDLE SPNL 18GA L3.5IN W/ QNCKE SHARPER BVL DURA CLICK

## (undated) DEVICE — DRAPE MICSCP W117XL305CM DIA65MM LENS W VARI LENS2 FOR LEICA

## (undated) DEVICE — Z INACTIVE OBSOLETE PER MEDTRONIC BIT DRILL 2.4MM

## (undated) DEVICE — PAD,NON-ADHERENT,3X8,STERILE,LF,1/PK: Brand: MEDLINE

## (undated) DEVICE — DRAPE,REIN 53X77,STERILE: Brand: MEDLINE

## (undated) DEVICE — CODMAN® SURGICAL PATTIES 1/2" X 1/2" (1.27CM X 1.27CM): Brand: CODMAN®

## (undated) DEVICE — DRESSING TRNSPAR W5XL4.5IN FLM SHT SEMIPERMEABLE WIND

## (undated) DEVICE — BLADE ES L4IN INSUL EDGE

## (undated) DEVICE — BLADE CLP TAPR HD WET DRY CAPABILITY GTT IN CHARGING USE

## (undated) DEVICE — PACK PROCEDURE SURG LUMBAR SPINE SVMMC

## (undated) DEVICE — CODMAN® SURGICAL PATTIES 1/2" X 3" (1.27CM X 7.62CM): Brand: CODMAN®

## (undated) DEVICE — PATIENT RETURN ELECTRODE, SINGLE-USE, CONTACT QUALITY MONITORING, ADULT, WITH 9FT CORD, FOR PATIENTS WEIGING OVER 33LBS. (15KG): Brand: MEGADYNE

## (undated) DEVICE — ADHESIVE SKIN CLSR 0.7ML TOP DERMBND ADV

## (undated) DEVICE — SHEET, T, LAPAROTOMY, STERILE: Brand: MEDLINE

## (undated) DEVICE — SUTURE VCRL SZ 2-0 L18IN ABSRB UD CT-1 L36MM 1/2 CIR J839D

## (undated) DEVICE — PROTECTOR ULN NRV PUR FOAM HK LOOP STRP ANATOMICALLY

## (undated) DEVICE — TOTAL TRAY, 16FR 10ML SIL FOLEY, URN: Brand: MEDLINE

## (undated) DEVICE — E-Z CLEAN, NON-STICK, PTFE COATED, ELECTROSURGICAL BLADE ELECTRODE, MODIFIED EXTENDED INSULATION, 2.5 INCH (6.35 CM): Brand: MEGADYNE

## (undated) DEVICE — DRAPE C ARM UNIV W41XL74IN CLR PLAS XR VELC CLSR POLY STRP

## (undated) DEVICE — YANKAUER,FLEXIBLE HANDLE,REGLR CAPACITY: Brand: MEDLINE INDUSTRIES, INC.